# Patient Record
Sex: MALE | Race: BLACK OR AFRICAN AMERICAN | Employment: OTHER | URBAN - METROPOLITAN AREA
[De-identification: names, ages, dates, MRNs, and addresses within clinical notes are randomized per-mention and may not be internally consistent; named-entity substitution may affect disease eponyms.]

---

## 2017-01-04 RX ORDER — AMITRIPTYLINE HYDROCHLORIDE 25 MG/1
TABLET, FILM COATED ORAL
Qty: 30 TABLET | Refills: 0 | Status: SHIPPED | OUTPATIENT
Start: 2017-01-04 | End: 2017-01-28

## 2017-01-09 RX ORDER — POTASSIUM CHLORIDE 600 MG/1
TABLET, FILM COATED, EXTENDED RELEASE ORAL
Qty: 30 TABLET | Refills: 0 | Status: SHIPPED | OUTPATIENT
Start: 2017-01-09 | End: 2017-02-16

## 2017-01-09 RX ORDER — FLUTICASONE PROPIONATE 50 MCG
SPRAY, SUSPENSION (ML) NASAL
Qty: 1 BOTTLE | Refills: 3 | Status: SHIPPED | OUTPATIENT
Start: 2017-01-09

## 2017-01-09 RX ORDER — MONTELUKAST SODIUM 10 MG/1
TABLET ORAL
Qty: 30 TABLET | Refills: 0 | Status: SHIPPED | OUTPATIENT
Start: 2017-01-09 | End: 2017-02-16

## 2017-01-22 RX ORDER — BACLOFEN 10 MG/1
TABLET ORAL
Qty: 90 TABLET | Refills: 0 | Status: SHIPPED | OUTPATIENT
Start: 2017-01-22 | End: 2017-02-16

## 2017-01-30 RX ORDER — AMITRIPTYLINE HYDROCHLORIDE 25 MG/1
TABLET, FILM COATED ORAL
Qty: 30 TABLET | Refills: 0 | Status: SHIPPED | OUTPATIENT
Start: 2017-01-30 | End: 2017-02-28

## 2017-01-30 RX ORDER — BENAZEPRIL HYDROCHLORIDE AND HYDROCHLOROTHIAZIDE 20; 12.5 MG/1; MG/1
TABLET ORAL
Qty: 30 TABLET | Refills: 0 | Status: SHIPPED | OUTPATIENT
Start: 2017-01-30 | End: 2017-03-07

## 2017-02-17 RX ORDER — BACLOFEN 10 MG/1
TABLET ORAL
Qty: 90 TABLET | Refills: 0 | Status: SHIPPED | OUTPATIENT
Start: 2017-02-17 | End: 2017-03-15

## 2017-02-17 RX ORDER — POTASSIUM CHLORIDE 600 MG/1
TABLET, FILM COATED, EXTENDED RELEASE ORAL
Qty: 30 TABLET | Refills: 0 | Status: SHIPPED | OUTPATIENT
Start: 2017-02-17 | End: 2017-03-15

## 2017-02-18 RX ORDER — MONTELUKAST SODIUM 10 MG/1
TABLET ORAL
Qty: 30 TABLET | Refills: 0 | Status: SHIPPED | OUTPATIENT
Start: 2017-02-18 | End: 2017-03-15

## 2017-03-01 RX ORDER — AMITRIPTYLINE HYDROCHLORIDE 25 MG/1
TABLET, FILM COATED ORAL
Qty: 30 TABLET | Refills: 2 | Status: SHIPPED | OUTPATIENT
Start: 2017-03-01 | End: 2017-04-27

## 2017-03-01 NOTE — TELEPHONE ENCOUNTER
Refill Protocol Appointment Criteria  · Appointment scheduled in the past 6 months or in the next 3 months  Recent Visits       Provider Department Primary Dx    4 months ago Mady Guaman DO [facility], [facility], [address], [facility] Cervical radicular

## 2017-03-15 ENCOUNTER — OFFICE VISIT (OUTPATIENT)
Dept: ENDOCRINOLOGY CLINIC | Facility: CLINIC | Age: 52
End: 2017-03-15

## 2017-03-15 VITALS
SYSTOLIC BLOOD PRESSURE: 124 MMHG | HEIGHT: 69 IN | HEART RATE: 85 BPM | WEIGHT: 292 LBS | DIASTOLIC BLOOD PRESSURE: 82 MMHG | BODY MASS INDEX: 43.25 KG/M2

## 2017-03-15 DIAGNOSIS — E78.5 DYSLIPIDEMIA ASSOCIATED WITH TYPE 2 DIABETES MELLITUS (HCC): ICD-10-CM

## 2017-03-15 DIAGNOSIS — Z79.4 UNCONTROLLED TYPE 2 DIABETES MELLITUS WITH HYPERGLYCEMIA, WITH LONG-TERM CURRENT USE OF INSULIN (HCC): Primary | ICD-10-CM

## 2017-03-15 DIAGNOSIS — E11.69 DYSLIPIDEMIA ASSOCIATED WITH TYPE 2 DIABETES MELLITUS (HCC): ICD-10-CM

## 2017-03-15 DIAGNOSIS — E11.65 UNCONTROLLED TYPE 2 DIABETES MELLITUS WITH HYPERGLYCEMIA, WITH LONG-TERM CURRENT USE OF INSULIN (HCC): Primary | ICD-10-CM

## 2017-03-15 LAB
CARTRIDGE LOT#: ABNORMAL NUMERIC
GLUCOSE BLOOD: 287
HEMOGLOBIN A1C: 11.2 % (ref 4.3–5.6)
TEST STRIP LOT #: NORMAL NUMERIC

## 2017-03-15 PROCEDURE — 82962 GLUCOSE BLOOD TEST: CPT | Performed by: INTERNAL MEDICINE

## 2017-03-15 PROCEDURE — 36416 COLLJ CAPILLARY BLOOD SPEC: CPT | Performed by: INTERNAL MEDICINE

## 2017-03-15 PROCEDURE — 99214 OFFICE O/P EST MOD 30 MIN: CPT | Performed by: INTERNAL MEDICINE

## 2017-03-15 PROCEDURE — 99212 OFFICE O/P EST SF 10 MIN: CPT | Performed by: INTERNAL MEDICINE

## 2017-03-15 PROCEDURE — 83036 HEMOGLOBIN GLYCOSYLATED A1C: CPT | Performed by: INTERNAL MEDICINE

## 2017-03-15 RX ORDER — DORZOLAMIDE HCL 20 MG/ML
SOLUTION/ DROPS OPHTHALMIC
Refills: 6 | COMMUNITY
Start: 2017-03-01 | End: 2019-03-05

## 2017-03-15 RX ORDER — NAPROXEN SODIUM 220 MG
TABLET ORAL
Qty: 100 EACH | Refills: 2 | Status: SHIPPED | OUTPATIENT
Start: 2017-03-15 | End: 2017-07-25

## 2017-03-15 NOTE — PATIENT INSTRUCTIONS
Stop all oral medications for Diabetes    Start insulin 70/30: 32 units 15 minutes before breakfast and dinner. Check sugars before breakfast and before dinner  Call me on Friday with sugars.     Call me before if sugar is < 70/ persistently more than 3

## 2017-03-15 NOTE — PROGRESS NOTES
Return Office Visit - Diabetes    CHIEF COMPLAINT:    Type 2 Diabetes     HISTORY OF PRESENT ILLNESS:   Tahir Ramos is a 45year old male who presents for follow up for diabetes.     DM HISTORY  Diagnosed: 2000      HISTORY OF DIABETES COMPLICATIONS: :  Hi BACLOFEN 10 MG Oral Tab TAKE 1 TABLET(10 MG) BY MOUTH THREE TIMES DAILY Disp: 90 tablet Rfl: 0   FLUTICASONE PROPIONATE 50 MCG/ACT Nasal Suspension SHAKE LIQUID AND USE 1 SPRAY IN EACH NOSTRIL TWICE DAILY Disp: 1 Bottle Rfl: 3   Atorvastatin Calcium 40 M past surgical history marked as reviewed. See past family history marked as reviewed. See past social history marked as reviewed.     ASSESSMENTS:     REVIEW OF SYSTEMS:  Constitutional: Negative for: weight change, fever, fatigue, cold/heat intolerance ( 3/2017)    ASSESSMENT AND PLAN:    1. Type 2 DM:    Plan:  Discussed the pathogenesis, natural course of diabetes.  Patient understands the importance of glycemic control and the implications of uncontrolled diabetes including Diabetic ketoacidosis and va

## 2017-03-16 ENCOUNTER — OFFICE VISIT (OUTPATIENT)
Dept: FAMILY MEDICINE CLINIC | Facility: CLINIC | Age: 52
End: 2017-03-16

## 2017-03-16 VITALS
HEART RATE: 88 BPM | DIASTOLIC BLOOD PRESSURE: 78 MMHG | WEIGHT: 290 LBS | TEMPERATURE: 98 F | RESPIRATION RATE: 17 BRPM | SYSTOLIC BLOOD PRESSURE: 110 MMHG | BODY MASS INDEX: 42.95 KG/M2 | HEIGHT: 69 IN

## 2017-03-16 DIAGNOSIS — R42 DIZZINESS: ICD-10-CM

## 2017-03-16 DIAGNOSIS — M50.10 HERNIATION OF CERVICAL INTERVERTEBRAL DISC WITH RADICULOPATHY: ICD-10-CM

## 2017-03-16 DIAGNOSIS — IMO0001 UNCONTROLLED TYPE 2 DIABETES MELLITUS WITHOUT COMPLICATION, WITHOUT LONG-TERM CURRENT USE OF INSULIN: Primary | ICD-10-CM

## 2017-03-16 DIAGNOSIS — I10 ESSENTIAL HYPERTENSION: ICD-10-CM

## 2017-03-16 DIAGNOSIS — M79.89 LEG SWELLING: ICD-10-CM

## 2017-03-16 LAB
BACTERIA UR QL AUTO: NEGATIVE /HPF
BILIRUB UR QL: NEGATIVE
CLARITY UR: CLEAR
COLOR UR: YELLOW
GLUCOSE UR-MCNC: >=500 MG/DL
HGB UR QL STRIP.AUTO: NEGATIVE
HYALINE CASTS #/AREA URNS AUTO: 8 /LPF
KETONES UR-MCNC: NEGATIVE MG/DL
LEUKOCYTE ESTERASE UR QL STRIP.AUTO: NEGATIVE
NITRITE UR QL STRIP.AUTO: NEGATIVE
PH UR: 5 [PH] (ref 5–8)
PROT UR-MCNC: NEGATIVE MG/DL
RBC #/AREA URNS AUTO: <1 /HPF
SP GR UR STRIP: 1.01 (ref 1–1.03)
UROBILINOGEN UR STRIP-ACNC: <2
VIT C UR-MCNC: NEGATIVE MG/DL
WBC #/AREA URNS AUTO: <1 /HPF

## 2017-03-16 PROCEDURE — 99214 OFFICE O/P EST MOD 30 MIN: CPT | Performed by: FAMILY MEDICINE

## 2017-03-16 PROCEDURE — 99212 OFFICE O/P EST SF 10 MIN: CPT | Performed by: FAMILY MEDICINE

## 2017-03-16 RX ORDER — POTASSIUM CHLORIDE 600 MG/1
TABLET, FILM COATED, EXTENDED RELEASE ORAL
Qty: 30 TABLET | Refills: 0 | Status: SHIPPED | OUTPATIENT
Start: 2017-03-16 | End: 2017-03-23

## 2017-03-16 RX ORDER — BACLOFEN 10 MG/1
TABLET ORAL
Qty: 90 TABLET | Refills: 0 | Status: SHIPPED | OUTPATIENT
Start: 2017-03-16 | End: 2017-07-12

## 2017-03-16 RX ORDER — PREGABALIN 50 MG/1
50 CAPSULE ORAL 3 TIMES DAILY
Qty: 90 CAPSULE | Refills: 0 | Status: SHIPPED | OUTPATIENT
Start: 2017-03-16 | End: 2017-07-05

## 2017-03-16 RX ORDER — FUROSEMIDE 40 MG/1
40 TABLET ORAL DAILY
Qty: 30 TABLET | Refills: 1 | Status: SHIPPED | OUTPATIENT
Start: 2017-03-16 | End: 2017-08-23 | Stop reason: DRUGHIGH

## 2017-03-16 RX ORDER — POTASSIUM CHLORIDE 600 MG/1
8 TABLET, FILM COATED, EXTENDED RELEASE ORAL DAILY
Qty: 30 TABLET | Refills: 1 | Status: SHIPPED | OUTPATIENT
Start: 2017-03-16 | End: 2017-07-11

## 2017-03-16 NOTE — PROGRESS NOTES
HPI:    Patient ID: Rodney Oquendo is a 45year old male. Swelling  This is a new problem. The current episode started 1 to 4 weeks ago. The problem occurs constantly. Associated symptoms include fatigue and myalgias.  Pertinent negatives include no visual symptoms include fatigue and myalgias. Pertinent negatives include no visual change. Exacerbated by: Likely uncontrolled blood sugars. He has tried nothing for the symptoms. The treatment provided no relief.        Review of Systems   Constitutional: Ziggy Joseph 3   METHSCOPOLAMINE BROMIDE 2.5 MG Oral Tab TAKE 1 TABLET BY MOUTH TWICE DAILY Disp: 120 tablet Rfl: 0   glipiZIDE 10 MG Oral Tab Take 1 tablet (10 mg total) by mouth 2 (two) times daily before meals.  Disp: 240 tablet Rfl: 0   Linagliptin (TRADJENTA) 5 MG of insulin ([facility] [address]  Ordered along with blood sugar check (>300 mg/dl)   - Urinalysis, Routine [E]; Future    2. Leg swelling  Lasix prescribed with potassium. 3. Dizziness  Likely secondary to # 1    4.  Essential hypertension  To goal.    5. Herniation of

## 2017-03-16 NOTE — PATIENT INSTRUCTIONS
Recommend weight loss via daily exercising and consistent healthy dietary changes. START insulin. Increase hydration. Recommend Lyrica. Lasix and potassium prescribed. Consider ARPIT hose. Keep legs elevated.

## 2017-03-17 ENCOUNTER — TELEPHONE (OUTPATIENT)
Dept: ENDOCRINOLOGY CLINIC | Facility: CLINIC | Age: 52
End: 2017-03-17

## 2017-03-17 RX ORDER — MONTELUKAST SODIUM 10 MG/1
TABLET ORAL
Qty: 30 TABLET | Refills: 3 | Status: SHIPPED | OUTPATIENT
Start: 2017-03-17 | End: 2017-08-03

## 2017-03-17 RX ORDER — GLIPIZIDE 10 MG/1
TABLET ORAL
Qty: 240 TABLET | Refills: 0 | Status: SHIPPED | OUTPATIENT
Start: 2017-03-17 | End: 2017-03-17 | Stop reason: ALTCHOICE

## 2017-03-17 NOTE — TELEPHONE ENCOUNTER
Blood sugar is not >250 that MD needs be paged. Patient is currently on insulin. Patient currently taking 32 units BID insulin 70/30. Patient's first injection was last night. No reports of hypoglycemia.  Instructed patient to continue to check BG levels BI

## 2017-03-17 NOTE — TELEPHONE ENCOUNTER
Dr. Milan Campoverde sent the following routing request:     I signed the glipizide prescription.        Please call pharmacy and cancel since I ercently changed patient to insulin.

## 2017-03-20 ENCOUNTER — TELEPHONE (OUTPATIENT)
Dept: FAMILY MEDICINE CLINIC | Facility: CLINIC | Age: 52
End: 2017-03-20

## 2017-03-20 NOTE — TELEPHONE ENCOUNTER
----- Message from Rudy Slava, DO sent at 3/19/2017  7:08 PM CDT -----  A1c greater than 11. Needs much better control. Recommend endocrine. See if patient is agreeable.

## 2017-03-20 NOTE — TELEPHONE ENCOUNTER
Pt returned call and aware of his lab results already. He has seen Dr Manjinder Guadalupe on 3/15/17 and Dr Charissa Hardy last visit on 3/16/17.   Next appt with endocrinology scheduled for 5/17/17 @ 1:00pm.

## 2017-03-20 NOTE — TELEPHONE ENCOUNTER
[phone], then ext [phone] thereafter. Also, sent message to pt via active [employer].

## 2017-03-21 NOTE — TELEPHONE ENCOUNTER
Pt called w/ results:     3/17/17  Evenin  3/18/17  Mornin   Evenin  3/19/17  Mornin   Evenin   3/20/17  Mornin   Evenin   3/21/17  Mornin    Please call.

## 2017-03-22 NOTE — TELEPHONE ENCOUNTER
Spoke with Ralph Sánchez and informed him of Dr. Yokasta Jones instructions below. He verbalized his understanding and had no further questions at this time.

## 2017-03-23 RX ORDER — POTASSIUM CHLORIDE 600 MG/1
TABLET, FILM COATED, EXTENDED RELEASE ORAL
Qty: 30 TABLET | Refills: 0 | Status: SHIPPED | OUTPATIENT
Start: 2017-03-23 | End: 2017-07-05

## 2017-03-24 ENCOUNTER — TELEPHONE (OUTPATIENT)
Dept: FAMILY MEDICINE CLINIC | Facility: CLINIC | Age: 52
End: 2017-03-24

## 2017-03-24 RX ORDER — POTASSIUM CHLORIDE 600 MG/1
16 TABLET, FILM COATED, EXTENDED RELEASE ORAL DAILY
Qty: 60 TABLET | Refills: 0 | Status: SHIPPED | OUTPATIENT
Start: 2017-03-24 | End: 2017-05-19

## 2017-03-24 RX ORDER — FUROSEMIDE 40 MG/1
80 TABLET ORAL DAILY
Qty: 60 TABLET | Refills: 0 | Status: SHIPPED | OUTPATIENT
Start: 2017-03-24 | End: 2017-05-05

## 2017-04-07 RX ORDER — GABAPENTIN 600 MG/1
600 TABLET ORAL 3 TIMES DAILY
Qty: 90 TABLET | Refills: 0 | Status: SHIPPED | OUTPATIENT
Start: 2017-04-07 | End: 2017-08-04 | Stop reason: ALTCHOICE

## 2017-04-07 NOTE — TELEPHONE ENCOUNTER
PA for Lyrica denied. Plan states please use preferred drug list medications; Neurontin (at least 1800 mg/day) AND at least one of the following effexor, effexor xr or Cymbalta.

## 2017-04-13 ENCOUNTER — TELEPHONE (OUTPATIENT)
Dept: CARDIOLOGY CLINIC | Facility: CLINIC | Age: 52
End: 2017-04-13

## 2017-04-13 RX ORDER — CARVEDILOL 12.5 MG/1
12.5 TABLET ORAL 2 TIMES DAILY WITH MEALS
Qty: 60 TABLET | Refills: 5 | Status: SHIPPED | OUTPATIENT
Start: 2017-04-13 | End: 2017-12-06

## 2017-04-13 NOTE — TELEPHONE ENCOUNTER
Current Outpatient Prescriptions:  carvedilol 12.5 MG Oral Tab Take 1 tablet (12.5 mg total) by mouth 2 (two) times daily with meals.  Disp: 60 tablet Rfl: 6

## 2017-04-27 RX ORDER — AMITRIPTYLINE HYDROCHLORIDE 25 MG/1
TABLET, FILM COATED ORAL
Qty: 30 TABLET | Refills: 2 | Status: SHIPPED | OUTPATIENT
Start: 2017-04-27 | End: 2017-08-19

## 2017-05-05 RX ORDER — FUROSEMIDE 40 MG/1
TABLET ORAL
Qty: 60 TABLET | Refills: 0 | Status: SHIPPED | OUTPATIENT
Start: 2017-05-05 | End: 2017-06-13

## 2017-05-08 RX ORDER — LINAGLIPTIN 5 MG/1
TABLET, FILM COATED ORAL
Qty: 90 TABLET | Refills: 0 | Status: SHIPPED | OUTPATIENT
Start: 2017-05-08 | End: 2017-08-04 | Stop reason: ALTCHOICE

## 2017-05-20 RX ORDER — POTASSIUM CHLORIDE 600 MG/1
TABLET, FILM COATED, EXTENDED RELEASE ORAL
Qty: 60 TABLET | Refills: 0 | Status: SHIPPED | OUTPATIENT
Start: 2017-05-20 | End: 2017-07-05

## 2017-05-22 RX ORDER — INSULIN NPH HUM/REG INSULIN HM 70-30/ML
VIAL (ML) SUBCUTANEOUS
Qty: 20 ML | Refills: 0 | Status: SHIPPED | OUTPATIENT
Start: 2017-05-22 | End: 2017-06-28

## 2017-05-22 NOTE — TELEPHONE ENCOUNTER
LOV 3/15/17. No F/U scheduled. Called Teodoro. Left detailed message that he is due for a follow up appt.

## 2017-06-08 ENCOUNTER — TELEPHONE (OUTPATIENT)
Dept: FAMILY MEDICINE CLINIC | Facility: CLINIC | Age: 52
End: 2017-06-08

## 2017-06-08 RX ORDER — METHSCOPOLAMINE BROMIDE 2.5 MG/1
TABLET ORAL
Qty: 120 TABLET | Refills: 0 | Status: SHIPPED | OUTPATIENT
Start: 2017-06-08 | End: 2017-08-03

## 2017-06-08 NOTE — TELEPHONE ENCOUNTER
Per pharmacy pt plan requires the generic medication to be dispensed for Amlodipine. Please fax the generic script to the pharmacy. Per pharmacy insurance will pay for once a day dosage.

## 2017-06-08 NOTE — TELEPHONE ENCOUNTER
Contacted pharmacy, was advised to disregard request as med is covered under pt's plan. Nothing further needed at this time.

## 2017-06-13 RX ORDER — FUROSEMIDE 40 MG/1
TABLET ORAL
Qty: 60 TABLET | Refills: 0 | Status: SHIPPED | OUTPATIENT
Start: 2017-06-13 | End: 2017-07-05

## 2017-06-29 RX ORDER — INSULIN NPH HUM/REG INSULIN HM 70-30/ML
VIAL (ML) SUBCUTANEOUS
Qty: 10 ML | Refills: 0 | Status: SHIPPED | OUTPATIENT
Start: 2017-06-29 | End: 2017-07-25

## 2017-06-29 NOTE — TELEPHONE ENCOUNTER
LOV 3/15/17 with RTC 2 months. No F/U scheduled. Called patient. Called patient. Booked follow up appt for 7/5 in [facility]. Per AM ok to refill to next follow up appt. 1 vial of insulin refilled.

## 2017-07-05 ENCOUNTER — OFFICE VISIT (OUTPATIENT)
Dept: ENDOCRINOLOGY CLINIC | Facility: CLINIC | Age: 52
End: 2017-07-05

## 2017-07-05 VITALS
HEIGHT: 69 IN | BODY MASS INDEX: 42.83 KG/M2 | SYSTOLIC BLOOD PRESSURE: 101 MMHG | DIASTOLIC BLOOD PRESSURE: 72 MMHG | WEIGHT: 289.19 LBS | HEART RATE: 93 BPM

## 2017-07-05 DIAGNOSIS — E11.69 DYSLIPIDEMIA ASSOCIATED WITH TYPE 2 DIABETES MELLITUS (HCC): ICD-10-CM

## 2017-07-05 DIAGNOSIS — E78.5 DYSLIPIDEMIA ASSOCIATED WITH TYPE 2 DIABETES MELLITUS (HCC): ICD-10-CM

## 2017-07-05 DIAGNOSIS — E11.65 UNCONTROLLED TYPE 2 DIABETES MELLITUS WITH HYPERGLYCEMIA, WITH LONG-TERM CURRENT USE OF INSULIN (HCC): Primary | ICD-10-CM

## 2017-07-05 DIAGNOSIS — Z79.4 UNCONTROLLED TYPE 2 DIABETES MELLITUS WITH HYPERGLYCEMIA, WITH LONG-TERM CURRENT USE OF INSULIN (HCC): Primary | ICD-10-CM

## 2017-07-05 LAB
CARTRIDGE LOT#: ABNORMAL NUMERIC
GLUCOSE BLOOD: 269
HEMOGLOBIN A1C: 12.2 % (ref 4.3–5.6)
TEST STRIP LOT #: NORMAL NUMERIC

## 2017-07-05 PROCEDURE — 36416 COLLJ CAPILLARY BLOOD SPEC: CPT | Performed by: INTERNAL MEDICINE

## 2017-07-05 PROCEDURE — 99212 OFFICE O/P EST SF 10 MIN: CPT | Performed by: INTERNAL MEDICINE

## 2017-07-05 PROCEDURE — 99214 OFFICE O/P EST MOD 30 MIN: CPT | Performed by: INTERNAL MEDICINE

## 2017-07-05 PROCEDURE — 83036 HEMOGLOBIN GLYCOSYLATED A1C: CPT | Performed by: INTERNAL MEDICINE

## 2017-07-05 PROCEDURE — 82962 GLUCOSE BLOOD TEST: CPT | Performed by: INTERNAL MEDICINE

## 2017-07-05 RX ORDER — POTASSIUM CHLORIDE 600 MG/1
TABLET, FILM COATED, EXTENDED RELEASE ORAL
Qty: 60 TABLET | Refills: 0 | OUTPATIENT
Start: 2017-07-05

## 2017-07-05 RX ORDER — GLUCOSAM/CHON-MSM1/C/MANG/BOSW 500-416.6
TABLET ORAL
Qty: 100 EACH | Refills: 3 | Status: SHIPPED | OUTPATIENT
Start: 2017-07-05

## 2017-07-05 NOTE — PROGRESS NOTES
Return Office Visit - Diabetes    CHIEF COMPLAINT:    Type 2 Diabetes   Dyslipidemia    HISTORY OF PRESENT ILLNESS:   Víctor Coreas is a 45year old male who presents for follow up for diabetes.     DM HISTORY  Diagnosed: 2000      HISTORY OF DIABETES COMPLI TABLET(10 MG) BY MOUTH THREE TIMES DAILY Disp: 90 tablet Rfl: 0   furosemide (LASIX) 40 MG Oral Tab Take 1 tablet (40 mg total) by mouth daily.  Disp: 30 tablet Rfl: 1   Potassium Chloride ER (KLOR-CON) 8 MEQ Oral Tab CR Take 1 tablet (8 mEq total) by mouth Vitro Strip Check sugars 2-3 times a day Disp: 100 each Rfl: 3       PAST MEDICAL, SOCIAL AND FAMILY HISTORY:  See past medical history marked as reviewed. See past surgical history marked as reviewed. See past family history marked as reviewed.   See pas 0-99 mg/dL 110 (H) 59   MICROALBUMIN/CREATININE RATIO Latest Ref Range: 0.0-20.0   121.1 (H)       HbA1c 11.7 % --> 7.2 % ( August 2016) --> 8.6 % ( 11/2016) --> 11.2 ( 3/2017) --> 12.2 % ( 7/2017)    ASSESSMENT AND PLAN:    1.  Type 2 DM:    Plan:  Discuss repeat lipid panel and Ur MA during that visit      This is a 30 minute visit and greater than 50% of the time was spent counseling the patient and/or coordinating care.                 Orders Placed This Encounter      POC Glycohemoglobin Klarissa Snowden      POC

## 2017-07-05 NOTE — TELEPHONE ENCOUNTER
Chart reviewed. Refill inappropriate. Patient medication list shows she is taking    Potassium Chloride ER (KLOR-CON) 8 MEQ Oral Tab CR 30 tablet 1 3/16/2017    Sig :  Take 1 tablet (8 mEq total) by mouth daily.         Hypertensive Medications  Protocol Cr

## 2017-07-13 RX ORDER — POTASSIUM CHLORIDE 600 MG/1
TABLET, FILM COATED, EXTENDED RELEASE ORAL
Qty: 60 TABLET | Refills: 0 | OUTPATIENT
Start: 2017-07-13

## 2017-07-18 RX ORDER — BACLOFEN 10 MG/1
TABLET ORAL
Qty: 90 TABLET | Refills: 0 | Status: SHIPPED | OUTPATIENT
Start: 2017-07-18 | End: 2017-08-16

## 2017-07-18 NOTE — TELEPHONE ENCOUNTER
DR Daryn Cline: please review and advise on refill.     Refill Protocol Appointment Criteria  · Appointment scheduled in the past 6 months or in the next 3 months  Recent Outpatient Visits            1 week ago Uncontrolled type 2 diabetes mellitus with hyperg

## 2017-07-19 RX ORDER — AMLODIPINE BESYLATE 10 MG/1
TABLET ORAL
Qty: 180 TABLET | Refills: 0 | Status: SHIPPED | OUTPATIENT
Start: 2017-07-19 | End: 2017-08-04

## 2017-07-19 NOTE — TELEPHONE ENCOUNTER
Requesting Amlodipine refill    Prescription refilled per FM refill protocol    Hypertensive Medications  Protocol Criteria:  · Appointment scheduled in the past 6 months or in the next 3 months  · BMP or CMP in the past 12 months  · Creatinine result < 2

## 2017-07-20 RX ORDER — MONTELUKAST SODIUM 10 MG/1
TABLET ORAL
Qty: 30 TABLET | Refills: 3 | OUTPATIENT
Start: 2017-07-20

## 2017-07-25 ENCOUNTER — TELEPHONE (OUTPATIENT)
Dept: ENDOCRINOLOGY CLINIC | Facility: CLINIC | Age: 52
End: 2017-07-25

## 2017-07-25 RX ORDER — GLIPIZIDE 10 MG/1
TABLET ORAL
Qty: 180 TABLET | Refills: 0 | Status: SHIPPED | OUTPATIENT
Start: 2017-07-25 | End: 2019-03-05

## 2017-07-25 RX ORDER — NAPROXEN SODIUM 220 MG
TABLET ORAL
Qty: 100 EACH | Refills: 2 | Status: SHIPPED | OUTPATIENT
Start: 2017-07-25 | End: 2018-05-04

## 2017-07-25 NOTE — ADDENDUM NOTE
Encounter addended by: Mateo Holland RN on: 7/25/2017 12:33 PM<BR>    Actions taken: Contacts section saved, Sign clinical note

## 2017-07-25 NOTE — TELEPHONE ENCOUNTER
Spoke with Fatsoma. Booked appt for next month. He provided sugars and will need an updated refill for his insulin. He is currently taking humulin 70/30 38 units with breakfast and 40 units with dinner.                 KRISTI PNEA      7/17:  196        360

## 2017-07-27 RX ORDER — MONTELUKAST SODIUM 10 MG/1
TABLET ORAL
Qty: 30 TABLET | Refills: 0 | OUTPATIENT
Start: 2017-07-27

## 2017-07-27 NOTE — TELEPHONE ENCOUNTER
Rx request for Montelukast Sodium 10 mg, Please review. Thank you. LOV: 11/19/14, No future appts scheduled.   Last Refill: 3/17/17

## 2017-08-03 RX ORDER — FUROSEMIDE 40 MG/1
TABLET ORAL
Qty: 60 TABLET | Refills: 1 | Status: SHIPPED | OUTPATIENT
Start: 2017-08-03 | End: 2017-10-02

## 2017-08-03 RX ORDER — MONTELUKAST SODIUM 10 MG/1
TABLET ORAL
Qty: 30 TABLET | Refills: 2 | Status: SHIPPED | OUTPATIENT
Start: 2017-08-03 | End: 2017-12-06

## 2017-08-03 RX ORDER — METHSCOPOLAMINE BROMIDE 2.5 MG/1
2.5 TABLET ORAL 2 TIMES DAILY
Qty: 120 TABLET | Refills: 1 | Status: SHIPPED | OUTPATIENT
Start: 2017-08-03 | End: 2020-07-15 | Stop reason: ALTCHOICE

## 2017-08-03 NOTE — TELEPHONE ENCOUNTER
PATIENT CALLING SAID THAT HE ALSO NEED THE SINGULAIR AND FUROSEMIDE AND METHSCOPOLAMINE BROMIDE  2.5 MG     INCLUDE REFILLS        THESE ARE IN HIS MEDICATIONS. PATIENT HAVING PROBLEMS GETTING FILLED. PATIENT FRUSTRATED.

## 2017-08-04 ENCOUNTER — TELEPHONE (OUTPATIENT)
Dept: FAMILY MEDICINE CLINIC | Facility: CLINIC | Age: 52
End: 2017-08-04

## 2017-08-04 ENCOUNTER — OFFICE VISIT (OUTPATIENT)
Dept: OTOLARYNGOLOGY | Facility: CLINIC | Age: 52
End: 2017-08-04

## 2017-08-04 VITALS
WEIGHT: 250 LBS | SYSTOLIC BLOOD PRESSURE: 144 MMHG | TEMPERATURE: 97 F | HEIGHT: 69 IN | DIASTOLIC BLOOD PRESSURE: 88 MMHG | BODY MASS INDEX: 37.03 KG/M2

## 2017-08-04 DIAGNOSIS — R07.0 THROAT PAIN IN ADULT: Primary | ICD-10-CM

## 2017-08-04 PROCEDURE — 99214 OFFICE O/P EST MOD 30 MIN: CPT | Performed by: OTOLARYNGOLOGY

## 2017-08-04 PROCEDURE — 99212 OFFICE O/P EST SF 10 MIN: CPT | Performed by: OTOLARYNGOLOGY

## 2017-08-04 PROCEDURE — 31575 DIAGNOSTIC LARYNGOSCOPY: CPT | Performed by: OTOLARYNGOLOGY

## 2017-08-04 RX ORDER — LORATADINE 10 MG/1
10 TABLET ORAL DAILY
Qty: 30 TABLET | Refills: 3 | Status: SHIPPED | OUTPATIENT
Start: 2017-08-04 | End: 2019-06-15 | Stop reason: ALTCHOICE

## 2017-08-04 RX ORDER — FLUTICASONE PROPIONATE 50 MCG
1 SPRAY, SUSPENSION (ML) NASAL 2 TIMES DAILY
Qty: 1 BOTTLE | Refills: 3 | Status: SHIPPED | OUTPATIENT
Start: 2017-08-04 | End: 2017-08-23

## 2017-08-04 RX ORDER — MONTELUKAST SODIUM 10 MG/1
10 TABLET ORAL NIGHTLY
Qty: 30 TABLET | Refills: 3 | Status: SHIPPED | OUTPATIENT
Start: 2017-08-04 | End: 2017-08-23

## 2017-08-04 NOTE — PROGRESS NOTES
Ronn Waterman is a 45year old male. Patient presents with:  Throat Problem: phlegm build up since September       HISTORY OF PRESENT ILLNESS  History of laryngeal abscess. Resolved with Clindamycin. Now having some throat issues due to chronic PND.  No frederick Comment:rarely   Exercise                No    Comment:walking only    Social History Narrative    The patient does not use an assistive device. .      The patient does live in a home with stairs.         Family History   Problem Relation Age of Onset   • Pr Negative Frequent skin infections, pigment change and rash. Hema/Lymph Negative Easy bleeding and easy bruising.            PHYSICAL EXAM    /88 (BP Location: Left arm, Patient Position: Sitting, Cuff Size: large)   Temp (!) 97.4 °F (36.3 °C) (Oral) performed. The flexible fiberoptic laryngoscope was placed into the nose or mouthand advanced  into the interior of the larynx. A thorough examination of the interior of the larynx was performed. Findings were as follows.        Hypopharynx/Larynx:  Epigl apply Misc, Check sugars 2-3 times a day., Disp: 100 each, Rfl: 3  •  AMITRIPTYLINE HCL 25 MG Oral Tab, TAKE 1 TABLET BY MOUTH EVERY NIGHT AT BEDTIME, Disp: 30 tablet, Rfl: 2  •  carvedilol 12.5 MG Oral Tab, Take 1 tablet (12.5 mg total) by mouth 2 (two) t 2 (two) times daily with meals.  ), Disp: 120 tablet, Rfl: 2  ASSESSMENT AND PLAN    1. Throat pain in adult  I suspect chronic postnasal discharge. No real history of GERD.   Erythema of the pharyngeal mucosa and laryngeal exam reveals no abnormalities ot

## 2017-08-04 NOTE — TELEPHONE ENCOUNTER
PA for Methscopolamine Bromide 2.5 mg tab completed with EPS via CMM response time 24-72 hours KEY [id number].

## 2017-08-04 NOTE — TELEPHONE ENCOUNTER
Per Areli, the Methscopolamine BR 2.5mg tabs is not covered under pt's insurance. Pls call [phone] for a prior auth or change medication, pt ID# [id number].

## 2017-08-04 NOTE — TELEPHONE ENCOUNTER
Refilled per protocol.    Refill Protocol Appointment Criteria  · Appointment scheduled in the past 12 months or in the next 3 months  Recent Outpatient Visits            4 weeks ago Uncontrolled type 2 diabetes mellitus with hyperglycemia, with long-term c Visit    4 months ago Uncontrolled type 2 diabetes mellitus with hyperglycemia, with long-term current use of insulin [facility])    Mayela Cristina MD    Office Visit    8 months ago Uncontrolled type 2 diabetes mellitus Eddie Goodman, DO    Office Visit        Future Appointments       Provider Department Appt Notes    Tomorrow MD Mario [address] ENT f/u    In 3 weeks Mayelin Elmore MD [address], [place]     In 1 month Emilia Jarrett,

## 2017-08-16 NOTE — TELEPHONE ENCOUNTER
LOV 7/5/2017   F/U 8/30/2017    I think this medication was discontinued. Not sure if you wanted it refilled or not.

## 2017-08-17 RX ORDER — LINAGLIPTIN 5 MG/1
TABLET, FILM COATED ORAL
Qty: 90 TABLET | Refills: 0 | OUTPATIENT
Start: 2017-08-17

## 2017-08-18 RX ORDER — BACLOFEN 10 MG/1
TABLET ORAL
Qty: 90 TABLET | Refills: 2 | Status: SHIPPED | OUTPATIENT
Start: 2017-08-18 | End: 2017-11-28

## 2017-08-18 NOTE — TELEPHONE ENCOUNTER
Please advise on refill request, med not part of protocol  Last refilled 7-19-17 #[id number]  Pended for 3 months.     Refill Protocol Appointment Criteria  · Appointment scheduled in the past 6 months or in the next 3 months  Recent Outpatient Visits

## 2017-08-23 ENCOUNTER — OFFICE VISIT (OUTPATIENT)
Dept: ENDOCRINOLOGY CLINIC | Facility: CLINIC | Age: 52
End: 2017-08-23

## 2017-08-23 ENCOUNTER — TELEPHONE (OUTPATIENT)
Dept: ENDOCRINOLOGY CLINIC | Facility: CLINIC | Age: 52
End: 2017-08-23

## 2017-08-23 ENCOUNTER — APPOINTMENT (OUTPATIENT)
Dept: LAB | Age: 52
End: 2017-08-23
Attending: INTERNAL MEDICINE
Payer: COMMERCIAL

## 2017-08-23 VITALS
WEIGHT: 286.81 LBS | BODY MASS INDEX: 42 KG/M2 | HEART RATE: 83 BPM | SYSTOLIC BLOOD PRESSURE: 109 MMHG | DIASTOLIC BLOOD PRESSURE: 73 MMHG

## 2017-08-23 DIAGNOSIS — E78.5 DYSLIPIDEMIA: ICD-10-CM

## 2017-08-23 DIAGNOSIS — E11.65 UNCONTROLLED TYPE 2 DIABETES MELLITUS WITH HYPERGLYCEMIA, WITH LONG-TERM CURRENT USE OF INSULIN (HCC): Primary | ICD-10-CM

## 2017-08-23 DIAGNOSIS — Z79.4 UNCONTROLLED TYPE 2 DIABETES MELLITUS WITH HYPERGLYCEMIA, WITH LONG-TERM CURRENT USE OF INSULIN (HCC): ICD-10-CM

## 2017-08-23 DIAGNOSIS — E04.1 THYROID NODULE: ICD-10-CM

## 2017-08-23 DIAGNOSIS — E11.65 UNCONTROLLED TYPE 2 DIABETES MELLITUS WITH HYPERGLYCEMIA, WITH LONG-TERM CURRENT USE OF INSULIN (HCC): ICD-10-CM

## 2017-08-23 DIAGNOSIS — R22.1 NECK FULLNESS: ICD-10-CM

## 2017-08-23 DIAGNOSIS — Z79.4 UNCONTROLLED TYPE 2 DIABETES MELLITUS WITH HYPERGLYCEMIA, WITH LONG-TERM CURRENT USE OF INSULIN (HCC): Primary | ICD-10-CM

## 2017-08-23 LAB
ALBUMIN SERPL BCP-MCNC: 4 G/DL (ref 3.5–4.8)
ALBUMIN/GLOB SERPL: 1.3 {RATIO} (ref 1–2)
ALP SERPL-CCNC: 65 U/L (ref 32–100)
ALT SERPL-CCNC: 27 U/L (ref 17–63)
ANION GAP SERPL CALC-SCNC: 7 MMOL/L (ref 0–18)
AST SERPL-CCNC: 22 U/L (ref 15–41)
BILIRUB SERPL-MCNC: 1.3 MG/DL (ref 0.3–1.2)
BUN SERPL-MCNC: 15 MG/DL (ref 8–20)
BUN/CREAT SERPL: 13.3 (ref 10–20)
CALCIUM SERPL-MCNC: 9.3 MG/DL (ref 8.5–10.5)
CHLORIDE SERPL-SCNC: 101 MMOL/L (ref 95–110)
CO2 SERPL-SCNC: 32 MMOL/L (ref 22–32)
CREAT SERPL-MCNC: 1.13 MG/DL (ref 0.5–1.5)
CREAT UR-MCNC: 167.5 MG/DL
GLOBULIN PLAS-MCNC: 3.2 G/DL (ref 2.5–3.7)
GLUCOSE BLOOD: 153
GLUCOSE SERPL-MCNC: 151 MG/DL (ref 70–99)
LDLC SERPL DIRECT ASSAY-MCNC: 67 MG/DL (ref 0–99)
MICROALBUMIN UR-MCNC: 4.8 MG/DL (ref 0–1.8)
MICROALBUMIN/CREAT UR: 28.7 MG/G{CREAT} (ref 0–20)
OSMOLALITY UR CALC.SUM OF ELEC: 294 MOSM/KG (ref 275–295)
POTASSIUM SERPL-SCNC: 3.8 MMOL/L (ref 3.3–5.1)
PROT SERPL-MCNC: 7.2 G/DL (ref 5.9–8.4)
SODIUM SERPL-SCNC: 140 MMOL/L (ref 136–144)
TEST STRIP LOT #: NORMAL NUMERIC

## 2017-08-23 PROCEDURE — 80053 COMPREHEN METABOLIC PANEL: CPT

## 2017-08-23 PROCEDURE — 99214 OFFICE O/P EST MOD 30 MIN: CPT | Performed by: INTERNAL MEDICINE

## 2017-08-23 PROCEDURE — 99212 OFFICE O/P EST SF 10 MIN: CPT | Performed by: INTERNAL MEDICINE

## 2017-08-23 PROCEDURE — 83721 ASSAY OF BLOOD LIPOPROTEIN: CPT

## 2017-08-23 PROCEDURE — 36416 COLLJ CAPILLARY BLOOD SPEC: CPT | Performed by: INTERNAL MEDICINE

## 2017-08-23 PROCEDURE — 82570 ASSAY OF URINE CREATININE: CPT

## 2017-08-23 PROCEDURE — 82043 UR ALBUMIN QUANTITATIVE: CPT

## 2017-08-23 PROCEDURE — 82962 GLUCOSE BLOOD TEST: CPT | Performed by: INTERNAL MEDICINE

## 2017-08-23 PROCEDURE — 36415 COLL VENOUS BLD VENIPUNCTURE: CPT

## 2017-08-23 RX ORDER — AMITRIPTYLINE HYDROCHLORIDE 25 MG/1
TABLET, FILM COATED ORAL
Qty: 30 TABLET | Refills: 0 | Status: SHIPPED | OUTPATIENT
Start: 2017-08-23 | End: 2017-09-20

## 2017-08-23 NOTE — PROGRESS NOTES
Return Office Visit - Diabetes    CHIEF COMPLAINT:    Type 2 Diabetes   Dyslipidemia    HISTORY OF PRESENT ILLNESS:   Brittany Varela is a 45year old male who presents for follow up for diabetes.     DM HISTORY  Diagnosed: 2000      HISTORY OF DIABETES COMPLI day. Disp: 100 each Rfl: 2   Potassium Chloride ER (KLOR-CON) 8 MEQ Oral Tab CR Take 1 tablet (8 mEq total) by mouth 2 (two) times daily. Disp: 60 tablet Rfl: 2   Glucose Blood (TRUE METRIX BLOOD GLUCOSE TEST) In Vitro Strip Check 2-3 times a day.  Disp: 30 social history marked as reviewed.     ASSESSMENTS:     REVIEW OF SYSTEMS:  Constitutional: Negative for: weight change, fever, fatigue, cold/heat intolerance  Eyes: Negative for:  Visual changes, proptosis, blurring  ENT: Negative for:  dysphagia, neck swe Type 2 DM:    Plan:  Discussed the pathogenesis, natural course of diabetes.  Patient understands the importance of glycemic control and the implications of uncontrolled diabetes including Diabetic ketoacidosis and various micro vascular and macrovascular c Microalb/Creat Ratio, Random Urine [E]      Comp Metabolic Panel [E]

## 2017-08-23 NOTE — TELEPHONE ENCOUNTER
Refilled per protocol.    Last refilled 4-27-17 #30 #[id number]  Refill Protocol Appointment Criteria  · Appointment scheduled in the past 6 months or in the next 3 months  Recent Outpatient Visits            2 weeks ago Throat pain in adult    [facility]

## 2017-08-29 RX ORDER — ATORVASTATIN CALCIUM 40 MG/1
TABLET, FILM COATED ORAL
Qty: 90 TABLET | Refills: 0 | Status: SHIPPED | OUTPATIENT
Start: 2017-08-29 | End: 2017-12-06

## 2017-08-29 NOTE — TELEPHONE ENCOUNTER
Fax received from [facility].  methscopolam tab 2.5mg is approved for 12 months

## 2017-08-30 NOTE — TELEPHONE ENCOUNTER
Called Teodoro. He states he is out of the house now and will contact the office this afternoon with blood sugars.

## 2017-08-31 NOTE — TELEPHONE ENCOUNTER
Pt called with sugar readings:      Before Brkfst Before Dinner  8/24/17  180  213  8/25/17  152  196  8/26/17  140  182  8/27/17  174  106  8/28/17  189  106  8/29/17  150  191  8/30/17  202  128  8/31/17  No reading    Please call.

## 2017-09-01 ENCOUNTER — HOSPITAL ENCOUNTER (OUTPATIENT)
Dept: ULTRASOUND IMAGING | Age: 52
Discharge: HOME | End: 2017-09-01
Attending: INTERNAL MEDICINE
Payer: COMMERCIAL

## 2017-09-01 DIAGNOSIS — R22.1 NECK FULLNESS: ICD-10-CM

## 2017-09-01 DIAGNOSIS — E04.1 THYROID NODULE: ICD-10-CM

## 2017-09-01 PROCEDURE — 76536 US EXAM OF HEAD AND NECK: CPT | Performed by: INTERNAL MEDICINE

## 2017-09-01 RX ORDER — BENAZEPRIL HYDROCHLORIDE AND HYDROCHLOROTHIAZIDE 20; 12.5 MG/1; MG/1
TABLET ORAL
Qty: 30 TABLET | Refills: 2 | Status: SHIPPED | OUTPATIENT
Start: 2017-09-01 | End: 2017-12-06

## 2017-09-01 NOTE — TELEPHONE ENCOUNTER
Called Teodoro. Informed him of Dr. Omkar Abdalla message below. He verbalized his understanding and had no further questions at this time.

## 2017-09-01 NOTE — TELEPHONE ENCOUNTER
Called Teodoor. Informed him of Dr. Alia Diane instructions below. He verbalized his understanding of the insulin changes and will call in one week with sugars. He saw his recent blood test results on MyChart.  He is concerned about his kidney health and

## 2017-09-01 NOTE — TELEPHONE ENCOUNTER
Refilled per protocol.   Hypertensive Medications  Protocol Criteria:  · Appointment scheduled in the past 6 months or in the next 3 months  · BMP or CMP in the past 12 months  · Creatinine result < 2  Recent Outpatient Visits            1 week ago Uncont

## 2017-09-01 NOTE — TELEPHONE ENCOUNTER
Please confirm current regimen of    insulin 70/30 to 38 in the morning and 40 in the evening   Recommend changing to 40 in am and 44 in pm  Call in one week.   Thx.

## 2017-09-01 NOTE — TELEPHONE ENCOUNTER
His kidney blood work is okay . Urine test does show some excretion of protein in urine. This can be due to uncontrolled BG but is reversible. Also he has been prescribed benazepril- HCTZ by Dr Randy Huntley today. The benazepril should help too.    It is impor

## 2017-09-05 ENCOUNTER — TELEPHONE (OUTPATIENT)
Dept: ENDOCRINOLOGY CLINIC | Facility: CLINIC | Age: 52
End: 2017-09-05

## 2017-09-05 NOTE — TELEPHONE ENCOUNTER
Patient has thyroid nodules. He will need a FNA, that is a biopsy of the nodule. When is he coming next? I can discuss it with him. He should come in 2 months.

## 2017-09-05 NOTE — TELEPHONE ENCOUNTER
Called Teodoro. Informed him of Dr. Roman Se message below. He has [employer] insurance so he would like to be seen before October. Booked appt in OP next week.

## 2017-09-12 RX ORDER — POTASSIUM CHLORIDE 600 MG/1
TABLET, FILM COATED, EXTENDED RELEASE ORAL
Qty: 60 TABLET | Refills: 0 | Status: SHIPPED | OUTPATIENT
Start: 2017-09-12 | End: 2017-11-26

## 2017-09-13 ENCOUNTER — OFFICE VISIT (OUTPATIENT)
Dept: ENDOCRINOLOGY CLINIC | Facility: CLINIC | Age: 52
End: 2017-09-13

## 2017-09-13 VITALS
DIASTOLIC BLOOD PRESSURE: 72 MMHG | HEIGHT: 69 IN | WEIGHT: 289.38 LBS | HEART RATE: 80 BPM | SYSTOLIC BLOOD PRESSURE: 106 MMHG | BODY MASS INDEX: 42.86 KG/M2

## 2017-09-13 DIAGNOSIS — E11.65 TYPE 2 DIABETES MELLITUS WITH HYPERGLYCEMIA, WITHOUT LONG-TERM CURRENT USE OF INSULIN (HCC): ICD-10-CM

## 2017-09-13 DIAGNOSIS — E04.1 THYROID NODULE: Primary | ICD-10-CM

## 2017-09-13 LAB
CARTRIDGE LOT#: ABNORMAL NUMERIC
GLUCOSE BLOOD: 151
HEMOGLOBIN A1C: 12.1 % (ref 4.3–5.6)
TEST STRIP LOT #: NORMAL NUMERIC

## 2017-09-13 PROCEDURE — 36416 COLLJ CAPILLARY BLOOD SPEC: CPT | Performed by: INTERNAL MEDICINE

## 2017-09-13 PROCEDURE — 99212 OFFICE O/P EST SF 10 MIN: CPT | Performed by: INTERNAL MEDICINE

## 2017-09-13 PROCEDURE — 82962 GLUCOSE BLOOD TEST: CPT | Performed by: INTERNAL MEDICINE

## 2017-09-13 PROCEDURE — 99213 OFFICE O/P EST LOW 20 MIN: CPT | Performed by: INTERNAL MEDICINE

## 2017-09-13 PROCEDURE — 83036 HEMOGLOBIN GLYCOSYLATED A1C: CPT | Performed by: INTERNAL MEDICINE

## 2017-09-13 NOTE — PROGRESS NOTES
Return Office Visit     CHIEF COMPLAINT:  Patient presents with:  Thyroid Nodule: Discuss treatment options  Diabetes: LDEE 2 weeks ago       HISTORY OF PRESENT ILLNESS:  Dino Leyva is a 45year old male who presents for follow up for euthyroid MNG. Rfl: 0   TRUEPLUS LANCETS 33G Does not apply Misc Check sugars 2-3 times a day. Disp: 100 each Rfl: 3   carvedilol 12.5 MG Oral Tab Take 1 tablet (12.5 mg total) by mouth 2 (two) times daily with meals.  Disp: 60 tablet Rfl: 5   Dorzolamide HCl 2 % Ophthalm cough  Cardiovascular: Negative for:  chest pain, palpitations, orthopnea  GI: Negative for:  abdominal pain, nausea, vomiting, diarrhea, constipation, bleeding  Neurology: Negative for: headache, numbness, weakness  Genito-Urinary: Negative for: dysuria, proceed with observation as opposed to getting a FNA at this time. Also, I see him for DM. Based on reported BG on this visit, I recommended changing insulin 70/30 to 48 am and 44 pm.   He will send BG in one week.          Orders Placed This Encounter

## 2017-09-18 ENCOUNTER — OFFICE VISIT (OUTPATIENT)
Dept: FAMILY MEDICINE CLINIC | Facility: CLINIC | Age: 52
End: 2017-09-18

## 2017-09-18 VITALS
TEMPERATURE: 97 F | HEIGHT: 69 IN | BODY MASS INDEX: 42.8 KG/M2 | SYSTOLIC BLOOD PRESSURE: 86 MMHG | WEIGHT: 289 LBS | HEART RATE: 82 BPM | DIASTOLIC BLOOD PRESSURE: 58 MMHG

## 2017-09-18 DIAGNOSIS — M50.90 CERVICAL DISC DISEASE: ICD-10-CM

## 2017-09-18 DIAGNOSIS — R07.89 CHEST DISCOMFORT: ICD-10-CM

## 2017-09-18 DIAGNOSIS — R10.13 EPIGASTRIC PAIN: ICD-10-CM

## 2017-09-18 DIAGNOSIS — E11.40 UNCONTROLLED TYPE 2 DIABETES MELLITUS WITH DIABETIC NEUROPATHY, WITH LONG-TERM CURRENT USE OF INSULIN (HCC): ICD-10-CM

## 2017-09-18 DIAGNOSIS — E11.65 UNCONTROLLED TYPE 2 DIABETES MELLITUS WITH DIABETIC NEUROPATHY, WITH LONG-TERM CURRENT USE OF INSULIN (HCC): ICD-10-CM

## 2017-09-18 DIAGNOSIS — I10 ESSENTIAL HYPERTENSION: Primary | ICD-10-CM

## 2017-09-18 DIAGNOSIS — Z79.4 UNCONTROLLED TYPE 2 DIABETES MELLITUS WITH DIABETIC NEUROPATHY, WITH LONG-TERM CURRENT USE OF INSULIN (HCC): ICD-10-CM

## 2017-09-18 PROCEDURE — 93000 ELECTROCARDIOGRAM COMPLETE: CPT | Performed by: FAMILY MEDICINE

## 2017-09-18 PROCEDURE — 99212 OFFICE O/P EST SF 10 MIN: CPT | Performed by: FAMILY MEDICINE

## 2017-09-18 PROCEDURE — 99214 OFFICE O/P EST MOD 30 MIN: CPT | Performed by: FAMILY MEDICINE

## 2017-09-18 PROCEDURE — 93005 ELECTROCARDIOGRAM TRACING: CPT | Performed by: FAMILY MEDICINE

## 2017-09-18 RX ORDER — ACYCLOVIR 400 MG/1
400 TABLET ORAL 2 TIMES DAILY
Qty: 10 TABLET | Refills: 11 | Status: SHIPPED | OUTPATIENT
Start: 2017-09-18 | End: 2018-10-22

## 2017-09-18 RX ORDER — RANITIDINE 150 MG/1
150 TABLET ORAL 2 TIMES DAILY
Qty: 60 TABLET | Refills: 2 | Status: SHIPPED | OUTPATIENT
Start: 2017-09-18 | End: 2018-01-17

## 2017-09-18 NOTE — PROGRESS NOTES
HPI:    Patient ID: Víctor Coreas is a 45year old male. Hypertension   This is a chronic problem. The current episode started more than 1 year ago. The problem is unchanged. The problem is controlled.  Pertinent negatives include no chest pain, headaches highest blood glucose is >200 mg/dl. His overall blood glucose range is >200 mg/dl. An ACE inhibitor/angiotensin II receptor blocker is being taken. Chest Pain    This is a recurrent problem. The current episode started more than 1 month ago.  The onset q Disp: 60 tablet Rfl: 0   BENAZEPRIL-HYDROCHLOROTHIAZIDE 20-12.5 MG Oral Tab TAKE 1 TABLET BY MOUTH EVERY DAY Disp: 30 tablet Rfl: 2   ATORVASTATIN 40 MG Oral Tab TAKE 1 TABLET(40 MG) BY MOUTH DAILY Disp: 90 tablet Rfl: 0   AMITRIPTYLINE HCL 25 MG Oral Tab HCl 2 % Ophthalmic Solution INSTILL 1 DROP INTO OU BID Disp:  Rfl: 6   FLUTICASONE PROPIONATE 50 MCG/ACT Nasal Suspension SHAKE LIQUID AND USE 1 SPRAY IN EACH NOSTRIL TWICE DAILY Disp: 1 Bottle Rfl: 3   pregabalin (LYRICA) 50 MG Oral Cap Take 1 capsule (50 40.0-44.9, adult ([facility] [place] [address]  Recommend weight loss via daily exercising and consistent healthy dietary changes. 4. Herpes genitalia  Refill anti-viral medication    5. Chest discomfort  EKG ordered    6. Cervical disc disease  To physiatry    7.  Epigastric p

## 2017-09-22 RX ORDER — AMITRIPTYLINE HYDROCHLORIDE 25 MG/1
TABLET, FILM COATED ORAL
Qty: 30 TABLET | Refills: 0 | Status: SHIPPED | OUTPATIENT
Start: 2017-09-22 | End: 2018-05-13

## 2017-09-22 NOTE — TELEPHONE ENCOUNTER
Refill Protocol Appointment Criteria  · Appointment scheduled in the past 6 months or in the next 3 months  Recent Outpatient Visits            4 days ago Essential hypertension    [address] [place] [address], [facility],     Office Vi

## 2017-10-02 DIAGNOSIS — I10 ESSENTIAL HYPERTENSION: Primary | ICD-10-CM

## 2017-10-02 RX ORDER — CALCIUM CITRATE/VITAMIN D3 200MG-6.25
TABLET ORAL
Qty: 300 STRIP | Refills: 0 | Status: SHIPPED | OUTPATIENT
Start: 2017-10-02 | End: 2018-01-04

## 2017-10-04 RX ORDER — FUROSEMIDE 40 MG/1
TABLET ORAL
Qty: 60 TABLET | Refills: 2 | Status: SHIPPED | OUTPATIENT
Start: 2017-10-04 | End: 2018-01-04

## 2017-11-29 RX ORDER — POTASSIUM CHLORIDE 600 MG/1
TABLET, FILM COATED, EXTENDED RELEASE ORAL
Qty: 60 TABLET | Refills: 0 | Status: SHIPPED | OUTPATIENT
Start: 2017-11-29 | End: 2018-01-04

## 2017-11-29 NOTE — TELEPHONE ENCOUNTER
· Please advise on refill. Thanks.   Recent Outpatient Visits            2 months ago Essential hypertension    [facility], [facility], [address] [place], Montserrat Oconnell, DO    Office Visit    2 months ago Thyroid nodule    [facility], [facility], [address] [facility]

## 2017-12-01 RX ORDER — BACLOFEN 10 MG/1
TABLET ORAL
Qty: 90 TABLET | Refills: 0 | Status: SHIPPED | OUTPATIENT
Start: 2017-12-01 | End: 2018-02-20

## 2017-12-07 RX ORDER — INSULIN NPH HUM/REG INSULIN HM 70-30/ML
VIAL (ML) SUBCUTANEOUS
Qty: 60 ML | Refills: 0 | Status: SHIPPED | OUTPATIENT
Start: 2017-12-07 | End: 2018-01-27

## 2017-12-07 RX ORDER — ATORVASTATIN CALCIUM 40 MG/1
TABLET, FILM COATED ORAL
Qty: 90 TABLET | Refills: 0 | Status: SHIPPED | OUTPATIENT
Start: 2017-12-07 | End: 2018-01-05

## 2017-12-07 RX ORDER — ATORVASTATIN CALCIUM 40 MG/1
TABLET, FILM COATED ORAL
Qty: 90 TABLET | Refills: 0 | Status: SHIPPED | OUTPATIENT
Start: 2017-12-07 | End: 2018-09-07

## 2017-12-08 RX ORDER — BENAZEPRIL HYDROCHLORIDE AND HYDROCHLOROTHIAZIDE 20; 12.5 MG/1; MG/1
TABLET ORAL
Qty: 30 TABLET | Refills: 0 | Status: SHIPPED | OUTPATIENT
Start: 2017-12-08 | End: 2018-01-04

## 2017-12-08 RX ORDER — MONTELUKAST SODIUM 10 MG/1
TABLET ORAL
Qty: 30 TABLET | Refills: 0 | Status: SHIPPED | OUTPATIENT
Start: 2017-12-08 | End: 2018-02-06

## 2017-12-08 RX ORDER — CARVEDILOL 12.5 MG/1
TABLET ORAL
Qty: 60 TABLET | Refills: 0 | Status: SHIPPED | OUTPATIENT
Start: 2017-12-08 | End: 2018-01-04

## 2017-12-08 NOTE — TELEPHONE ENCOUNTER
Hypertensive Medications  Protocol Criteria:  · Appointment scheduled in the past 6 months or in the next 3 months  · BMP or CMP in the past 12 months  · Creatinine result < 2  Recent Outpatient Visits            2 months ago Essential hypertension    [facility]    [facility], [facility], [facility] Nadia Wallace MD    Office Visit    4 months ago Throat pain in adult    [facility] Elaine Hui MD    Office Visit    5 months ago Uncontrolled type 2 diabetes mellitus with hypergly

## 2017-12-09 ENCOUNTER — TELEPHONE (OUTPATIENT)
Dept: OTOLARYNGOLOGY | Facility: CLINIC | Age: 52
End: 2017-12-09

## 2017-12-09 NOTE — TELEPHONE ENCOUNTER
••  FLUTICASONE PROPIONATE 50 MCG/ACT Nasal Suspension, SHAKE LIQUID AND USE 1 SPRAY IN EACH NOSTRIL TWICE DAILY, Disp: 1 Bottle, Rfl: 3    Rx refill received from [facility]    Patient has [employer] Insurance    EC not contracted with [employer] as of 10/

## 2017-12-11 NOTE — TELEPHONE ENCOUNTER
Pharmacy informed Analy Coronel is not contracted / [facility] (as of 10/1/17). Request for refill of Fluticasone denied; pt will have to request Rx from PCP or obtain new insurance and RTC.

## 2018-01-04 DIAGNOSIS — I10 ESSENTIAL HYPERTENSION: ICD-10-CM

## 2018-01-05 RX ORDER — CALCIUM CITRATE/VITAMIN D3 200MG-6.25
TABLET ORAL
Qty: 300 STRIP | Refills: 0 | Status: SHIPPED | OUTPATIENT
Start: 2018-01-05 | End: 2021-11-22

## 2018-01-05 RX ORDER — POTASSIUM CHLORIDE 600 MG/1
TABLET, FILM COATED, EXTENDED RELEASE ORAL
Qty: 60 TABLET | Refills: 0 | Status: SHIPPED | OUTPATIENT
Start: 2018-01-05 | End: 2018-01-31

## 2018-01-05 RX ORDER — ATORVASTATIN CALCIUM 20 MG/1
TABLET, FILM COATED ORAL
Qty: 90 TABLET | Refills: 0 | OUTPATIENT
Start: 2018-01-05

## 2018-01-05 RX ORDER — FUROSEMIDE 40 MG/1
TABLET ORAL
Qty: 60 TABLET | Refills: 0 | Status: SHIPPED | OUTPATIENT
Start: 2018-01-05 | End: 2018-01-31

## 2018-01-05 RX ORDER — BENAZEPRIL HYDROCHLORIDE AND HYDROCHLOROTHIAZIDE 20; 12.5 MG/1; MG/1
TABLET ORAL
Qty: 30 TABLET | Refills: 0 | Status: SHIPPED | OUTPATIENT
Start: 2018-01-05 | End: 2018-01-31

## 2018-01-05 RX ORDER — ATORVASTATIN CALCIUM 40 MG/1
TABLET, FILM COATED ORAL
Qty: 90 TABLET | Refills: 0 | Status: SHIPPED | OUTPATIENT
Start: 2018-01-05 | End: 2018-01-15

## 2018-01-05 RX ORDER — CARVEDILOL 12.5 MG/1
TABLET ORAL
Qty: 60 TABLET | Refills: 0 | Status: SHIPPED | OUTPATIENT
Start: 2018-01-05 | End: 2018-01-31

## 2018-01-05 NOTE — TELEPHONE ENCOUNTER
please advise as does not meet RN protocol for refill criteria  - KCL  Other meds refilled        Hypertensive Medications  Protocol Criteria:  · Appointment scheduled in the past 6 months or in the next 3 months  · BMP or CMP in the past 12 months  · Cre

## 2018-01-10 ENCOUNTER — TELEPHONE (OUTPATIENT)
Dept: ENDOCRINOLOGY CLINIC | Facility: CLINIC | Age: 53
End: 2018-01-10

## 2018-01-10 ENCOUNTER — OFFICE VISIT (OUTPATIENT)
Dept: ENDOCRINOLOGY CLINIC | Facility: CLINIC | Age: 53
End: 2018-01-10

## 2018-01-10 VITALS
SYSTOLIC BLOOD PRESSURE: 112 MMHG | BODY MASS INDEX: 42.36 KG/M2 | HEIGHT: 69 IN | WEIGHT: 286 LBS | HEART RATE: 84 BPM | DIASTOLIC BLOOD PRESSURE: 68 MMHG

## 2018-01-10 DIAGNOSIS — E04.1 THYROID NODULE: ICD-10-CM

## 2018-01-10 DIAGNOSIS — E11.65 UNCONTROLLED TYPE 2 DIABETES MELLITUS WITH HYPERGLYCEMIA, WITH LONG-TERM CURRENT USE OF INSULIN (HCC): Primary | ICD-10-CM

## 2018-01-10 DIAGNOSIS — E78.5 DYSLIPIDEMIA: ICD-10-CM

## 2018-01-10 DIAGNOSIS — Z79.4 UNCONTROLLED TYPE 2 DIABETES MELLITUS WITH HYPERGLYCEMIA, WITH LONG-TERM CURRENT USE OF INSULIN (HCC): Primary | ICD-10-CM

## 2018-01-10 LAB
CARTRIDGE LOT#: ABNORMAL NUMERIC
GLUCOSE BLOOD: 113
HEMOGLOBIN A1C: 9.6 % (ref 4.3–5.6)
TEST STRIP LOT #: NORMAL NUMERIC

## 2018-01-10 PROCEDURE — 99214 OFFICE O/P EST MOD 30 MIN: CPT | Performed by: INTERNAL MEDICINE

## 2018-01-10 PROCEDURE — 99212 OFFICE O/P EST SF 10 MIN: CPT | Performed by: INTERNAL MEDICINE

## 2018-01-10 PROCEDURE — 82962 GLUCOSE BLOOD TEST: CPT | Performed by: INTERNAL MEDICINE

## 2018-01-10 PROCEDURE — 36416 COLLJ CAPILLARY BLOOD SPEC: CPT | Performed by: INTERNAL MEDICINE

## 2018-01-10 PROCEDURE — 83036 HEMOGLOBIN GLYCOSYLATED A1C: CPT | Performed by: INTERNAL MEDICINE

## 2018-01-10 NOTE — PROGRESS NOTES
Return Office Visit - Diabetes    CHIEF COMPLAINT:    Type 2 Diabetes   Dyslipidemia  Euthyroid MNG    HISTORY OF PRESENT ILLNESS:   Valeria Wesley is a 47year old male who presents for follow up for diabetes.     DM HISTORY  Diagnosed: 2000      HISTORY OF MG) BY MOUTH EVERY NIGHT Disp: 90 tablet Rfl: 0   BACLOFEN 10 MG Oral Tab TAKE 1 TABLET(10 MG) BY MOUTH THREE TIMES DAILY Disp: 90 tablet Rfl: 0   AMITRIPTYLINE HCL 25 MG Oral Tab TAKE 1 TABLET BY MOUTH EVERY NIGHT AT BEDTIME Disp: 30 tablet Rfl: 0   RaNIT mouth 3 (three) times daily.  Disp: 90 capsule Rfl: 0   MetFORMIN HCl  MG Oral Tablet 24 Hr Take 4 tablets (2,000 mg total) by mouth daily with breakfast. (Patient taking differently: Take 500 mg by mouth 2 (two) times daily with meals.  ) Disp: 120 t normal bowel sounds, soft, non-distended, non-tender,   SKIN:  no bruising or bleeding, no rashes and no lesions  DIABETIC FOOT EXAM: normal monofilament sensation, normal pulses, no edema, no skin lesions      DATA:                 HbA1c 11.7 % --> 7.2 % of thyroid nodules in the population approx 50-60% of the population  -Discussed risk of malignancy is approx 3-5%, 95-97% of nodules are benign  -Discussed recommendation to perform FNA biopsy of nodules greater than 1cm in size  -Discussed that if nodule

## 2018-01-10 NOTE — TELEPHONE ENCOUNTER
Called Jorge at [phone]. Submitted. PA has been submitted for further review. Case #: [id number]. Representative states that we can fax additional information to [phone]. Faxed today's OV note and last thyroid US.

## 2018-01-15 ENCOUNTER — OFFICE VISIT (OUTPATIENT)
Dept: OTOLARYNGOLOGY | Facility: CLINIC | Age: 53
End: 2018-01-15

## 2018-01-15 VITALS
DIASTOLIC BLOOD PRESSURE: 79 MMHG | SYSTOLIC BLOOD PRESSURE: 123 MMHG | WEIGHT: 280 LBS | BODY MASS INDEX: 41.47 KG/M2 | HEIGHT: 69 IN | TEMPERATURE: 98 F

## 2018-01-15 DIAGNOSIS — R07.0 THROAT PAIN IN ADULT: Primary | ICD-10-CM

## 2018-01-15 PROCEDURE — 99214 OFFICE O/P EST MOD 30 MIN: CPT | Performed by: OTOLARYNGOLOGY

## 2018-01-15 PROCEDURE — 99212 OFFICE O/P EST SF 10 MIN: CPT | Performed by: OTOLARYNGOLOGY

## 2018-01-15 RX ORDER — METHSCOPOLAMINE BROMIDE 2.5 MG/1
2.5 TABLET ORAL 2 TIMES DAILY
Qty: 60 TABLET | Refills: 3 | Status: SHIPPED | OUTPATIENT
Start: 2018-01-15 | End: 2019-05-28

## 2018-01-15 RX ORDER — FLUTICASONE PROPIONATE 50 MCG
1 SPRAY, SUSPENSION (ML) NASAL 2 TIMES DAILY
Qty: 3 BOTTLE | Refills: 3 | Status: SHIPPED | OUTPATIENT
Start: 2018-01-15 | End: 2019-05-28

## 2018-01-15 RX ORDER — MONTELUKAST SODIUM 10 MG/1
10 TABLET ORAL NIGHTLY
Qty: 90 TABLET | Refills: 3 | Status: SHIPPED | OUTPATIENT
Start: 2018-01-15 | End: 2019-02-15

## 2018-01-15 RX ORDER — LORATADINE 10 MG/1
10 TABLET ORAL DAILY
Qty: 90 TABLET | Refills: 3 | Status: SHIPPED | OUTPATIENT
Start: 2018-01-15 | End: 2018-02-06

## 2018-01-15 NOTE — TELEPHONE ENCOUNTER
PA response received. Request has been denied. Patient does not meet criteria for repeat US at this time: \"The time frame for these follow up [address] studies is as follows.  One, every 6 months for up to 24 months if results of your first [address] for this problem

## 2018-01-15 NOTE — PROGRESS NOTES
Gabriella Wright is a 47year old male. Patient presents with: Follow - Up: regarding throat pain, pain comes and goes   Sinus Problem: constant post nasal drip       HISTORY OF PRESENT ILLNESS  History of laryngeal abscess. Resolved with Clindamycin.  Now ha status:             Spouse name:                       Years of education:                 Number of children:               Social History Main Topics    Smoking status: Never Smoker                                                                S SYSTEMS    System Neg/Pos Details   Constitutional Negative Fatigue, fever and weight loss. ENMT Negative Drooling. Eyes Negative Blurred vision and vision changes. Respiratory Negative Dyspnea and wheezing.    Cardio Negative Chest pain, irregular he Left: Normal. Septum -Normal  Turbinates - Right: Normal, Left: Normal.  Nasal mucosa congested       Current Outpatient Prescriptions:   •  POTASSIUM CHLORIDE ER 8 MEQ Oral Tab CR, TAKE 1 TABLET(8 MEQ) BY MOUTH TWICE DAILY, Disp: 60 tablet, Rfl: 0  •  MARJORIE Does not apply Misc, Use syringes to inject insulin two times per day., Disp: 100 each, Rfl: 2  •  Glucose Blood (TRUE METRIX BLOOD GLUCOSE TEST) In Vitro Strip, Check 2-3 times a day., Disp: 300 each, Rfl: 0  •  TRUEPLUS LANCETS 33G Does not apply Misc, C

## 2018-01-16 ENCOUNTER — TELEPHONE (OUTPATIENT)
Dept: FAMILY MEDICINE CLINIC | Facility: CLINIC | Age: 53
End: 2018-01-16

## 2018-01-16 NOTE — TELEPHONE ENCOUNTER
Zack Roman dropped off a Medical Examiner's Certificate for Disability Benefits - Duty form to be completed. Form and signed release of information form emailed to [email or web address]. ORG, payment not made but pt informed of payment. Original form given to Abdullahi Benoit in [facility].

## 2018-01-16 NOTE — TELEPHONE ENCOUNTER
I guess there is not much we can do, since we have already sent notes etc.   Please inform the patient  Also, he should call if he has any compressive symptoms. Thanks.

## 2018-01-17 DIAGNOSIS — R10.13 EPIGASTRIC PAIN: ICD-10-CM

## 2018-01-17 NOTE — TELEPHONE ENCOUNTER
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 Logged for processing. FCR[employer] prepayment request emailed to pt '[email or web address]' .  NK

## 2018-01-18 RX ORDER — RANITIDINE 150 MG/1
TABLET ORAL
Qty: 60 TABLET | Refills: 0 | Status: SHIPPED | OUTPATIENT
Start: 2018-01-18 | End: 2018-03-04

## 2018-01-18 NOTE — TELEPHONE ENCOUNTER
Gastrointestional Medication:  Refill Protocol Appointment Criteria  · Appointment scheduled in the past 12 months or in the next 3 months  Recent Outpatient Visits            3 days ago Throat pain in adult    [facility] E[facility] Esther Velez,

## 2018-01-25 NOTE — TELEPHONE ENCOUNTER
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 May I extend for 2018?   Please advise, thanks, Sadie Lockett

## 2018-01-26 NOTE — TELEPHONE ENCOUNTER
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 If you approve please sign off. If you do not support please advise and I can revise his form. Also I have the original and I will bring next week for original signature required. Thanks! Johanna    Please sign off on form:  -Highlight the patient and hit \"Chart\" button. -In Chart Review, w/in the Encounter tab - click 1 time on the Telephone call encounter for 01/16/18. Scroll down the telephone encounter.  -Click \"scan on\" blue Hyperlink under \"Media\" heading for PPD Disab. Form Dr. Blanco Shelton 01/26/18 w/in the telephone enc.  -Click on Acknowledge button at the bottom right corner and left-click onto image, signature stamp appears and drag signature to Provider signature line. Stamp will turn blue. Close window.     Thank you,  Francisco

## 2018-01-29 RX ORDER — INSULIN NPH HUM/REG INSULIN HM 70-30/ML
VIAL (ML) SUBCUTANEOUS
Qty: 60 ML | Refills: 0 | Status: SHIPPED | OUTPATIENT
Start: 2018-01-29 | End: 2019-05-28 | Stop reason: ALTCHOICE

## 2018-01-31 DIAGNOSIS — I10 ESSENTIAL HYPERTENSION: ICD-10-CM

## 2018-01-31 RX ORDER — FUROSEMIDE 40 MG/1
TABLET ORAL
Qty: 60 TABLET | Refills: 0 | Status: SHIPPED | OUTPATIENT
Start: 2018-01-31 | End: 2018-02-26

## 2018-01-31 RX ORDER — CARVEDILOL 12.5 MG/1
TABLET ORAL
Qty: 60 TABLET | Refills: 0 | Status: SHIPPED | OUTPATIENT
Start: 2018-01-31 | End: 2018-03-10

## 2018-01-31 RX ORDER — BENAZEPRIL HYDROCHLORIDE AND HYDROCHLOROTHIAZIDE 20; 12.5 MG/1; MG/1
TABLET ORAL
Qty: 30 TABLET | Refills: 0 | Status: SHIPPED | OUTPATIENT
Start: 2018-01-31 | End: 2018-02-26

## 2018-01-31 NOTE — TELEPHONE ENCOUNTER
Hypertensive Medications  Protocol Criteria:  · Appointment scheduled in the past 6 months or in the next 3 months  · BMP or CMP in the past 12 months  · Creatinine result < 2  Recent Outpatient Visits            2 weeks ago Throat pain in adult    Elurs

## 2018-02-01 RX ORDER — POTASSIUM CHLORIDE 600 MG/1
TABLET, FILM COATED, EXTENDED RELEASE ORAL
Qty: 60 TABLET | Refills: 0 | Status: SHIPPED | OUTPATIENT
Start: 2018-02-01 | End: 2018-02-26

## 2018-02-06 ENCOUNTER — LAB ENCOUNTER (OUTPATIENT)
Dept: LAB | Age: 53
End: 2018-02-06
Attending: FAMILY MEDICINE
Payer: MEDICAID

## 2018-02-06 ENCOUNTER — OFFICE VISIT (OUTPATIENT)
Dept: FAMILY MEDICINE CLINIC | Facility: CLINIC | Age: 53
End: 2018-02-06

## 2018-02-06 VITALS
RESPIRATION RATE: 16 BRPM | WEIGHT: 286 LBS | HEART RATE: 83 BPM | SYSTOLIC BLOOD PRESSURE: 110 MMHG | HEIGHT: 69 IN | BODY MASS INDEX: 42.36 KG/M2 | DIASTOLIC BLOOD PRESSURE: 83 MMHG | TEMPERATURE: 98 F

## 2018-02-06 DIAGNOSIS — Z12.5 PROSTATE CANCER SCREENING: ICD-10-CM

## 2018-02-06 DIAGNOSIS — Z00.00 ENCOUNTER FOR SCREENING AND PREVENTATIVE CARE: ICD-10-CM

## 2018-02-06 DIAGNOSIS — E11.9 TYPE 2 DIABETES MELLITUS WITHOUT COMPLICATION, WITHOUT LONG-TERM CURRENT USE OF INSULIN (HCC): ICD-10-CM

## 2018-02-06 DIAGNOSIS — E66.01 MORBID OBESITY WITH BMI OF 40.0-44.9, ADULT (HCC): ICD-10-CM

## 2018-02-06 DIAGNOSIS — Z13.29 THYROID DISORDER SCREEN: ICD-10-CM

## 2018-02-06 DIAGNOSIS — Z83.79 FAMILY HISTORY OF COLITIS: ICD-10-CM

## 2018-02-06 DIAGNOSIS — R19.5 CHANGE IN CONSISTENCY OF STOOL: Primary | ICD-10-CM

## 2018-02-06 DIAGNOSIS — I10 ESSENTIAL HYPERTENSION: ICD-10-CM

## 2018-02-06 LAB
ALBUMIN SERPL BCP-MCNC: 3.9 G/DL (ref 3.5–4.8)
ALBUMIN/GLOB SERPL: 1.3 {RATIO} (ref 1–2)
ALP SERPL-CCNC: 61 U/L (ref 32–100)
ALT SERPL-CCNC: 25 U/L (ref 17–63)
ANION GAP SERPL CALC-SCNC: 11 MMOL/L (ref 0–18)
AST SERPL-CCNC: 21 U/L (ref 15–41)
BASOPHILS # BLD: 0.1 K/UL (ref 0–0.2)
BASOPHILS NFR BLD: 1 %
BILIRUB SERPL-MCNC: 1 MG/DL (ref 0.3–1.2)
BILIRUB UR QL: NEGATIVE
BUN SERPL-MCNC: 8 MG/DL (ref 8–20)
BUN/CREAT SERPL: 8.4 (ref 10–20)
CALCIUM SERPL-MCNC: 9.1 MG/DL (ref 8.5–10.5)
CHLORIDE SERPL-SCNC: 102 MMOL/L (ref 95–110)
CHOLEST SERPL-MCNC: 126 MG/DL (ref 110–200)
CLARITY UR: CLEAR
CO2 SERPL-SCNC: 28 MMOL/L (ref 22–32)
COLOR UR: YELLOW
CREAT SERPL-MCNC: 0.95 MG/DL (ref 0.5–1.5)
EOSINOPHIL # BLD: 0.2 K/UL (ref 0–0.7)
EOSINOPHIL NFR BLD: 4 %
ERYTHROCYTE [DISTWIDTH] IN BLOOD BY AUTOMATED COUNT: 14.1 % (ref 11–15)
GLOBULIN PLAS-MCNC: 3.1 G/DL (ref 2.5–3.7)
GLUCOSE SERPL-MCNC: 64 MG/DL (ref 70–99)
GLUCOSE UR-MCNC: NEGATIVE MG/DL
HCT VFR BLD AUTO: 40.6 % (ref 41–52)
HDLC SERPL-MCNC: 43 MG/DL
HGB BLD-MCNC: 13.3 G/DL (ref 13.5–17.5)
KETONES UR-MCNC: NEGATIVE MG/DL
LDLC SERPL CALC-MCNC: 62 MG/DL (ref 0–99)
LEUKOCYTE ESTERASE UR QL STRIP.AUTO: NEGATIVE
LYMPHOCYTES # BLD: 1.8 K/UL (ref 1–4)
LYMPHOCYTES NFR BLD: 28 %
MCH RBC QN AUTO: 27.4 PG (ref 27–32)
MCHC RBC AUTO-ENTMCNC: 32.8 G/DL (ref 32–37)
MCV RBC AUTO: 83.6 FL (ref 80–100)
MONOCYTES # BLD: 0.5 K/UL (ref 0–1)
MONOCYTES NFR BLD: 7 %
NEUTROPHILS # BLD AUTO: 3.8 K/UL (ref 1.8–7.7)
NEUTROPHILS NFR BLD: 60 %
NITRITE UR QL STRIP.AUTO: NEGATIVE
NONHDLC SERPL-MCNC: 83 MG/DL
OSMOLALITY UR CALC.SUM OF ELEC: 288 MOSM/KG (ref 275–295)
PATIENT FASTING: YES
PH UR: 6 [PH] (ref 5–8)
PLATELET # BLD AUTO: 206 K/UL (ref 140–400)
PMV BLD AUTO: 10.4 FL (ref 7.4–10.3)
POTASSIUM SERPL-SCNC: 3.4 MMOL/L (ref 3.3–5.1)
PROT SERPL-MCNC: 7 G/DL (ref 5.9–8.4)
PROT UR-MCNC: NEGATIVE MG/DL
PSA SERPL-MCNC: 4.9 NG/ML (ref 0–4)
RBC # BLD AUTO: 4.86 M/UL (ref 4.5–5.9)
RBC #/AREA URNS AUTO: 0 /HPF
SODIUM SERPL-SCNC: 141 MMOL/L (ref 136–144)
SP GR UR STRIP: 1.01 (ref 1–1.03)
TRIGL SERPL-MCNC: 104 MG/DL (ref 1–149)
TSH SERPL-ACNC: 1.57 UIU/ML (ref 0.45–5.33)
UROBILINOGEN UR STRIP-ACNC: <2
VIT C UR-MCNC: NEGATIVE MG/DL
WBC # BLD AUTO: 6.3 K/UL (ref 4–11)
WBC #/AREA URNS AUTO: 0 /HPF

## 2018-02-06 PROCEDURE — 83036 HEMOGLOBIN GLYCOSYLATED A1C: CPT

## 2018-02-06 PROCEDURE — 81001 URINALYSIS AUTO W/SCOPE: CPT

## 2018-02-06 PROCEDURE — 84443 ASSAY THYROID STIM HORMONE: CPT

## 2018-02-06 PROCEDURE — 99214 OFFICE O/P EST MOD 30 MIN: CPT | Performed by: FAMILY MEDICINE

## 2018-02-06 PROCEDURE — 85025 COMPLETE CBC W/AUTO DIFF WBC: CPT

## 2018-02-06 PROCEDURE — 93000 ELECTROCARDIOGRAM COMPLETE: CPT | Performed by: FAMILY MEDICINE

## 2018-02-06 PROCEDURE — 80053 COMPREHEN METABOLIC PANEL: CPT

## 2018-02-06 PROCEDURE — 99396 PREV VISIT EST AGE 40-64: CPT | Performed by: FAMILY MEDICINE

## 2018-02-06 PROCEDURE — 93005 ELECTROCARDIOGRAM TRACING: CPT | Performed by: FAMILY MEDICINE

## 2018-02-06 PROCEDURE — 80061 LIPID PANEL: CPT

## 2018-02-06 PROCEDURE — 36415 COLL VENOUS BLD VENIPUNCTURE: CPT

## 2018-02-06 RX ORDER — ACYCLOVIR 400 MG/1
400 TABLET ORAL DAILY
Refills: 11 | COMMUNITY
Start: 2018-01-27 | End: 2019-03-05

## 2018-02-06 NOTE — PATIENT INSTRUCTIONS
Recommend weight loss via daily exercising and consistent healthy dietary changes. Monitor blood pressures and record at home. Limit salt intake. Comply with medications. Medication reviewed and renewed where needed and appropriate.   All diabetic labs a

## 2018-02-06 NOTE — PROGRESS NOTES
HPI:    Patient ID: Ronn Waterman is a 47year old male.     47year old AA male here for complete preventive care physical and for status update on any confirmed chronic medical illnesses and follow up on any previous labs or procedures that were suggestive pain and palpitations. Genitourinary: Positive for impotence. Neurological: Negative for headaches.             Current Outpatient Prescriptions:  POTASSIUM CHLORIDE ER 8 MEQ Oral Tab CR TAKE 1 TABLET(8 MEQ) BY MOUTH TWICE DAILY Disp: 60 tablet Rfl: 0 Methscopolamine Bromide 2.5 MG Oral Tab Take 1 tablet (2.5 mg total) by mouth 2 (two) times daily. Disp: 60 tablet Rfl: 3   Fluticasone Propionate 50 MCG/ACT Nasal Suspension 1 spray by Nasal route 2 (two) times daily.  Disp: 3 Bottle Rfl: 3   TRUE METRIX clear and moist.   Neck: No thyromegaly present. Cardiovascular: Normal rate and regular rhythm. Exam reveals no gallop. Edema not present. Carotid bruit not present. Pulmonary/Chest: Effort normal and breath sounds normal. No respiratory distress. GI appointment. Return in about 3 weeks (around 2/27/2018), or if symptoms worsen or fail to improve.          RT#[id number]

## 2018-02-07 LAB — HBA1C MFR BLD: 8.7 % (ref 4–6)

## 2018-02-07 NOTE — TELEPHONE ENCOUNTER
Form completed pts disability co requires the orig. 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 Pt into OPO and paid and I mailed the orig form to him at his home address per his requests.  No addl action is needed,  MARLENE

## 2018-02-23 RX ORDER — BACLOFEN 10 MG/1
TABLET ORAL
Qty: 90 TABLET | Refills: 0 | Status: SHIPPED | OUTPATIENT
Start: 2018-02-23 | End: 2018-03-25

## 2018-02-23 NOTE — TELEPHONE ENCOUNTER
Refill Protocol Appointment Criteria  · Appointment scheduled in the past 6 months or in the next 3 months  Recent Outpatient Visits            2 weeks ago Change in consistency of stool    [facility], [facility], [address], [place], [address], [place]

## 2018-02-26 DIAGNOSIS — I10 ESSENTIAL HYPERTENSION: ICD-10-CM

## 2018-02-28 RX ORDER — POTASSIUM CHLORIDE 600 MG/1
TABLET, FILM COATED, EXTENDED RELEASE ORAL
Qty: 60 TABLET | Refills: 0 | Status: SHIPPED | OUTPATIENT
Start: 2018-02-28 | End: 2018-03-28

## 2018-02-28 RX ORDER — BENAZEPRIL HYDROCHLORIDE AND HYDROCHLOROTHIAZIDE 20; 12.5 MG/1; MG/1
TABLET ORAL
Qty: 30 TABLET | Refills: 0 | Status: SHIPPED | OUTPATIENT
Start: 2018-02-28 | End: 2018-03-28

## 2018-02-28 RX ORDER — FUROSEMIDE 40 MG/1
TABLET ORAL
Qty: 60 TABLET | Refills: 0 | Status: SHIPPED | OUTPATIENT
Start: 2018-02-28 | End: 2018-03-28

## 2018-02-28 NOTE — TELEPHONE ENCOUNTER
LOV: 2-6-18 Last Rx: 2-1-18    No protocol     Please advise in regards to refill request. Thank You        Hypertensive Medications: Refilled per protocol    Protocol Criteria:  · Appointment scheduled in the past 6 months or in the next 3 months  · BMP o

## 2018-03-04 DIAGNOSIS — R10.13 EPIGASTRIC PAIN: ICD-10-CM

## 2018-03-05 RX ORDER — RANITIDINE 150 MG/1
TABLET ORAL
Qty: 60 TABLET | Refills: 0 | Status: SHIPPED | OUTPATIENT
Start: 2018-03-05 | End: 2018-04-10

## 2018-03-05 NOTE — TELEPHONE ENCOUNTER
Refill Protocol Appointment Criteria  · Appointment scheduled in the past 12 months or in the next 3 months  Recent Outpatient Visits            3 weeks ago Change in consistency of stool    [address], [place] [address], [place], [address], [place]

## 2018-03-10 DIAGNOSIS — I10 HYPERTENSION, UNSPECIFIED TYPE: Primary | ICD-10-CM

## 2018-03-10 RX ORDER — AMLODIPINE BESYLATE 10 MG/1
TABLET ORAL
Qty: 180 TABLET | Refills: 0 | Status: SHIPPED | OUTPATIENT
Start: 2018-03-10 | End: 2018-09-02

## 2018-03-10 RX ORDER — CARVEDILOL 12.5 MG/1
TABLET ORAL
Qty: 60 TABLET | Refills: 0 | Status: SHIPPED | OUTPATIENT
Start: 2018-03-10 | End: 2018-05-03

## 2018-03-15 NOTE — TELEPHONE ENCOUNTER
LOV 1/10/18 - F/U 4/11/18    Pended for provider    Per LOV \"c/w insulin 70/30 to 35 in the morning and c/w 32 in the evening.      He unruly take 4-6 units extra when he has a high carbohydrate meal\"

## 2018-03-26 RX ORDER — BACLOFEN 10 MG/1
TABLET ORAL
Qty: 90 TABLET | Refills: 0 | Status: SHIPPED | OUTPATIENT
Start: 2018-03-26 | End: 2018-06-24

## 2018-03-27 NOTE — TELEPHONE ENCOUNTER
Please advise on refill request.     Refill Protocol Appointment Criteria  · Appointment scheduled in the past 6 months or in the next 3 months  Recent Outpatient Visits            1 month ago Change in consistency of stool    [facility], [facility], [address],

## 2018-03-28 DIAGNOSIS — I10 ESSENTIAL HYPERTENSION: ICD-10-CM

## 2018-03-31 RX ORDER — FUROSEMIDE 40 MG/1
TABLET ORAL
Qty: 60 TABLET | Refills: 0 | Status: SHIPPED | OUTPATIENT
Start: 2018-03-31 | End: 2018-04-27

## 2018-03-31 RX ORDER — BENAZEPRIL HYDROCHLORIDE AND HYDROCHLOROTHIAZIDE 20; 12.5 MG/1; MG/1
TABLET ORAL
Qty: 30 TABLET | Refills: 0 | Status: SHIPPED | OUTPATIENT
Start: 2018-03-31 | End: 2018-04-27

## 2018-03-31 RX ORDER — POTASSIUM CHLORIDE 600 MG/1
TABLET, FILM COATED, EXTENDED RELEASE ORAL
Qty: 60 TABLET | Refills: 0 | Status: SHIPPED | OUTPATIENT
Start: 2018-03-31 | End: 2018-04-27

## 2018-03-31 NOTE — TELEPHONE ENCOUNTER
Hypertensive Medications  Protocol Criteria:  · Appointment scheduled in the past 6 months or in the next 3 months  · BMP or CMP in the past 12 months  · Creatinine result < 2  Recent Outpatient Visits            1 month ago Change in consistency of stool Name: FUROSEMIDE 40MG TABLETS] 60 tablet 0     Sig: TAKE 2 TABLETS(80 MG) BY MOUTH DAILY             Unable to refill per protocol due for ov.   pls advise, thanks.      Future Appointments  Date Time Provider Mandy Patel   4/11/2018 11:30 AM Jairo

## 2018-04-10 DIAGNOSIS — R10.13 EPIGASTRIC PAIN: ICD-10-CM

## 2018-04-11 RX ORDER — RANITIDINE 150 MG/1
TABLET ORAL
Qty: 60 TABLET | Refills: 0 | Status: SHIPPED | OUTPATIENT
Start: 2018-04-11 | End: 2018-05-31

## 2018-04-11 NOTE — TELEPHONE ENCOUNTER
Refill Protocol Appointment Criteria  · Appointment scheduled in the past 12 months or in the next 3 months  Recent Outpatient Visits            2 months ago Change in consistency of stool    [facility], [facility], [address], [place], [address], [place]

## 2018-04-27 DIAGNOSIS — I10 ESSENTIAL HYPERTENSION: ICD-10-CM

## 2018-04-30 RX ORDER — FUROSEMIDE 40 MG/1
TABLET ORAL
Qty: 60 TABLET | Refills: 0 | Status: SHIPPED | OUTPATIENT
Start: 2018-04-30 | End: 2018-05-29

## 2018-04-30 RX ORDER — BENAZEPRIL HYDROCHLORIDE AND HYDROCHLOROTHIAZIDE 20; 12.5 MG/1; MG/1
TABLET ORAL
Qty: 30 TABLET | Refills: 0 | Status: SHIPPED | OUTPATIENT
Start: 2018-04-30 | End: 2018-05-29

## 2018-04-30 RX ORDER — POTASSIUM CHLORIDE 600 MG/1
TABLET, FILM COATED, EXTENDED RELEASE ORAL
Qty: 60 TABLET | Refills: 0 | Status: SHIPPED | OUTPATIENT
Start: 2018-04-30 | End: 2018-05-29

## 2018-05-04 RX ORDER — NAPROXEN SODIUM 220 MG
TABLET ORAL
Qty: 100 EACH | Refills: 5 | Status: SHIPPED | OUTPATIENT
Start: 2018-05-04 | End: 2019-07-01

## 2018-05-14 RX ORDER — AMITRIPTYLINE HYDROCHLORIDE 25 MG/1
TABLET, FILM COATED ORAL
Qty: 30 TABLET | Refills: 2 | Status: SHIPPED | OUTPATIENT
Start: 2018-05-14 | End: 2018-08-06

## 2018-05-14 NOTE — TELEPHONE ENCOUNTER
Please advise on refill request.     Hypertensive Medications  Protocol Criteria:  · Appointment scheduled in the past 6 months or in the next 3 months  · BMP or CMP in the past 12 months  · Creatinine result < 2  Recent Outpatient Visits            3 maría

## 2018-05-29 DIAGNOSIS — R10.13 EPIGASTRIC PAIN: ICD-10-CM

## 2018-05-29 DIAGNOSIS — I10 ESSENTIAL HYPERTENSION: ICD-10-CM

## 2018-05-31 RX ORDER — POTASSIUM CHLORIDE 600 MG/1
TABLET, FILM COATED, EXTENDED RELEASE ORAL
Qty: 60 TABLET | Refills: 0 | Status: SHIPPED | OUTPATIENT
Start: 2018-05-31 | End: 2018-06-25

## 2018-05-31 RX ORDER — FUROSEMIDE 40 MG/1
TABLET ORAL
Qty: 60 TABLET | Refills: 0 | Status: SHIPPED | OUTPATIENT
Start: 2018-05-31 | End: 2018-06-25

## 2018-05-31 RX ORDER — RANITIDINE 150 MG/1
TABLET ORAL
Qty: 60 TABLET | Refills: 0 | Status: SHIPPED | OUTPATIENT
Start: 2018-05-31 | End: 2018-07-05

## 2018-05-31 RX ORDER — BENAZEPRIL HYDROCHLORIDE AND HYDROCHLOROTHIAZIDE 20; 12.5 MG/1; MG/1
TABLET ORAL
Qty: 30 TABLET | Refills: 0 | Status: SHIPPED | OUTPATIENT
Start: 2018-05-31 | End: 2018-06-25

## 2018-05-31 NOTE — TELEPHONE ENCOUNTER
LOV: 2/6/18, LR: 4/30/18, script pended. Please advise.       Hypertensive Medications  Protocol Criteria:  · Appointment scheduled in the past 6 months or in the next 3 months  · BMP or CMP in the past 12 months  · Creatinine result < 2  Recent Outpatient

## 2018-06-24 RX ORDER — BACLOFEN 10 MG/1
TABLET ORAL
Qty: 90 TABLET | Refills: 0 | Status: SHIPPED | OUTPATIENT
Start: 2018-06-24 | End: 2018-07-25

## 2018-06-25 DIAGNOSIS — I10 ESSENTIAL HYPERTENSION: ICD-10-CM

## 2018-06-25 RX ORDER — POTASSIUM CHLORIDE 600 MG/1
TABLET, FILM COATED, EXTENDED RELEASE ORAL
Qty: 60 TABLET | Refills: 2 | Status: SHIPPED | OUTPATIENT
Start: 2018-06-25 | End: 2018-09-17

## 2018-06-25 RX ORDER — BENAZEPRIL HYDROCHLORIDE AND HYDROCHLOROTHIAZIDE 20; 12.5 MG/1; MG/1
TABLET ORAL
Qty: 30 TABLET | Refills: 2 | Status: SHIPPED | OUTPATIENT
Start: 2018-06-25 | End: 2018-09-17

## 2018-06-25 RX ORDER — FUROSEMIDE 40 MG/1
TABLET ORAL
Qty: 60 TABLET | Refills: 2 | Status: SHIPPED | OUTPATIENT
Start: 2018-06-25 | End: 2018-09-17

## 2018-06-26 NOTE — TELEPHONE ENCOUNTER
Rx approved for 90 days per protocol for benazepril and furosemide.   Please advise on refill request for potassium      Hypertensive Medications  Protocol Criteria:  · Appointment scheduled in the past 6 months or in the next 3 months  · BMP or CMP in the

## 2018-07-03 DIAGNOSIS — I10 HYPERTENSION, UNSPECIFIED TYPE: ICD-10-CM

## 2018-07-03 RX ORDER — CARVEDILOL 12.5 MG/1
TABLET ORAL
Qty: 60 TABLET | Refills: 0 | Status: SHIPPED | OUTPATIENT
Start: 2018-07-03 | End: 2018-09-02

## 2018-07-04 NOTE — TELEPHONE ENCOUNTER
Hypertensive Medications  Protocol Criteria:  · Appointment scheduled in the past 6 months or in the next 3 months  · BMP or CMP in the past 12 months  · Creatinine result < 2  Recent Outpatient Visits            4 months ago Change in consistency of stool

## 2018-07-05 DIAGNOSIS — R10.13 EPIGASTRIC PAIN: ICD-10-CM

## 2018-07-05 RX ORDER — RANITIDINE 150 MG/1
TABLET ORAL
Qty: 180 TABLET | Refills: 0 | Status: SHIPPED | OUTPATIENT
Start: 2018-07-05 | End: 2020-07-15 | Stop reason: ALTCHOICE

## 2018-07-06 NOTE — TELEPHONE ENCOUNTER
Refill Protocol Appointment Criteria  · Appointment scheduled in the past 12 months or in the next 3 months  Recent Outpatient Visits            4 months ago Change in consistency of stool    [address] [facility], [address] [place], [place]

## 2018-07-25 RX ORDER — BACLOFEN 10 MG/1
TABLET ORAL
Qty: 90 TABLET | Refills: 0 | Status: SHIPPED | OUTPATIENT
Start: 2018-07-25 | End: 2018-07-25

## 2018-07-25 RX ORDER — BACLOFEN 10 MG/1
TABLET ORAL
Qty: 270 TABLET | Refills: 0 | Status: SHIPPED | OUTPATIENT
Start: 2018-07-25 | End: 2018-10-23

## 2018-07-25 NOTE — TELEPHONE ENCOUNTER
LOV: 2-6-18 Last Rx: 6-24-18    No protocol     Please advise in regards to refill request. Thank You

## 2018-08-07 RX ORDER — AMITRIPTYLINE HYDROCHLORIDE 25 MG/1
TABLET, FILM COATED ORAL
Qty: 90 TABLET | Refills: 0 | Status: SHIPPED | OUTPATIENT
Start: 2018-08-07 | End: 2018-10-31

## 2018-08-07 NOTE — TELEPHONE ENCOUNTER
Refill Protocol Appointment Criteria  · Appointment scheduled in the past 6 months or in the next 3 months  Recent Outpatient Visits            6 months ago Change in consistency of stool    [facility], [facility], [address], [place], [address], [place]

## 2018-09-02 DIAGNOSIS — I10 HYPERTENSION, UNSPECIFIED TYPE: ICD-10-CM

## 2018-09-02 RX ORDER — AMLODIPINE BESYLATE 10 MG/1
TABLET ORAL
Qty: 180 TABLET | Refills: 0 | Status: SHIPPED | OUTPATIENT
Start: 2018-09-02 | End: 2019-03-24

## 2018-09-02 RX ORDER — CARVEDILOL 12.5 MG/1
TABLET ORAL
Qty: 180 TABLET | Refills: 0 | Status: SHIPPED | OUTPATIENT
Start: 2018-09-02 | End: 2018-12-04

## 2018-09-03 NOTE — TELEPHONE ENCOUNTER
Hypertensive Medications  Protocol Criteria:  · Appointment scheduled in the past 6 months or in the next 3 months  · BMP or CMP in the past 12 months  · Creatinine result < 2  Recent Outpatient Visits            6 months ago Change in consistency of stool

## 2018-09-06 DIAGNOSIS — E78.5 HYPERLIPIDEMIA, UNSPECIFIED HYPERLIPIDEMIA TYPE: Primary | ICD-10-CM

## 2018-09-07 RX ORDER — ATORVASTATIN CALCIUM 40 MG/1
TABLET, FILM COATED ORAL
Qty: 90 TABLET | Refills: 0 | Status: SHIPPED | OUTPATIENT
Start: 2018-09-07 | End: 2018-12-04

## 2018-09-07 NOTE — TELEPHONE ENCOUNTER
Refilled  He has been having insurance issues. He is expecting his insurance to kick in by the endo of the month. He will call ad make an apt at that time.

## 2018-09-13 ENCOUNTER — TELEPHONE (OUTPATIENT)
Dept: FAMILY MEDICINE CLINIC | Facility: CLINIC | Age: 53
End: 2018-09-13

## 2018-09-13 RX ORDER — HUMAN INSULIN 100 [USP'U]/ML
32 INJECTION, SUSPENSION SUBCUTANEOUS
Qty: 3 ML | Refills: 3 | Status: SHIPPED | OUTPATIENT
Start: 2018-09-13 | End: 2019-08-20

## 2018-09-13 NOTE — TELEPHONE ENCOUNTER
Per pharmacy pt insurance does not cover Humulin 70/30 Insulin. Preferred alternative is Novolin Inj, please fax updated script to pharmacy.

## 2018-09-17 DIAGNOSIS — I10 ESSENTIAL HYPERTENSION: ICD-10-CM

## 2018-09-18 RX ORDER — POTASSIUM CHLORIDE 600 MG/1
TABLET, FILM COATED, EXTENDED RELEASE ORAL
Qty: 60 TABLET | Refills: 0 | Status: SHIPPED | OUTPATIENT
Start: 2018-09-18 | End: 2018-10-14

## 2018-09-18 RX ORDER — BENAZEPRIL HYDROCHLORIDE AND HYDROCHLOROTHIAZIDE 20; 12.5 MG/1; MG/1
TABLET ORAL
Qty: 30 TABLET | Refills: 0 | Status: SHIPPED | OUTPATIENT
Start: 2018-09-18 | End: 2018-10-14

## 2018-09-18 RX ORDER — FUROSEMIDE 40 MG/1
TABLET ORAL
Qty: 60 TABLET | Refills: 0 | Status: SHIPPED | OUTPATIENT
Start: 2018-09-18 | End: 2018-10-14

## 2018-09-18 NOTE — TELEPHONE ENCOUNTER
Requested Prescriptions     Pending Prescriptions Disp Refills   • POTASSIUM CHLORIDE ER 8 MEQ Oral Tab CR [Pharmacy Med Name: POTASSIUM CHLORIDE ER 8MEQ TABLETS] 60 tablet 0     Sig: TAKE 1 TABLET(8 MEQ) BY MOUTH TWICE DAILY   • BENAZEPRIL-HYDROCHLOROTHIA 02/06/2018     02/06/2018    K 3.4 02/06/2018     02/06/2018    CO2 28 02/06/2018    GLOBULIN 3.1 02/06/2018    AGRATIO 1.2 08/05/2016    ANIONGAP 11 02/06/2018    OSMOCALC 288 02/06/2018

## 2018-10-14 DIAGNOSIS — I10 ESSENTIAL HYPERTENSION: ICD-10-CM

## 2018-10-15 RX ORDER — BENAZEPRIL HYDROCHLORIDE AND HYDROCHLOROTHIAZIDE 20; 12.5 MG/1; MG/1
TABLET ORAL
Qty: 30 TABLET | Refills: 2 | Status: SHIPPED | OUTPATIENT
Start: 2018-10-15 | End: 2018-11-16

## 2018-10-15 RX ORDER — POTASSIUM CHLORIDE 600 MG/1
TABLET, FILM COATED, EXTENDED RELEASE ORAL
Qty: 60 TABLET | Refills: 2 | Status: SHIPPED | OUTPATIENT
Start: 2018-10-15 | End: 2018-11-16

## 2018-10-15 RX ORDER — FUROSEMIDE 40 MG/1
TABLET ORAL
Qty: 60 TABLET | Refills: 2 | Status: SHIPPED | OUTPATIENT
Start: 2018-10-15 | End: 2018-11-16

## 2018-10-15 NOTE — TELEPHONE ENCOUNTER
Please review; protocol failed.   Hypertensive Medications  Protocol Criteria:  · Appointment scheduled in the past 6 months or in the next 3 months  · BMP or CMP in the past 12 months  · Creatinine result < 2  Recent Outpatient Visits            8 months a Tomas Sewell MD    Office Visit    1 year ago Essential hypertension    [facility], Saravanan Pino Robbie Haws, DO    Office Visit    1 year ago Thyroid nodule    [facility], [facility], Diane Pino

## 2018-10-22 RX ORDER — ACYCLOVIR 400 MG/1
TABLET ORAL
Qty: 10 TABLET | Refills: 0 | Status: SHIPPED | OUTPATIENT
Start: 2018-10-22 | End: 2020-07-15 | Stop reason: ALTCHOICE

## 2018-10-23 RX ORDER — BACLOFEN 10 MG/1
TABLET ORAL
Qty: 270 TABLET | Refills: 0 | Status: SHIPPED | OUTPATIENT
Start: 2018-10-23 | End: 2019-01-23

## 2018-10-23 NOTE — TELEPHONE ENCOUNTER
Review pended refill request as it does not fall under a protocol.     Last Rx: medication never prescribed by Dr. Aysha Guaman: 02/06/18    Refill Protocol Appointment Criteria  · Appointment scheduled in the past 6 months or in the next 3 months  Recent

## 2018-10-23 NOTE — TELEPHONE ENCOUNTER
Review pended refill request as it does not fall under a protocol.     Last Rx: 07/25/18  LOV: 02/06/18    Refill Protocol Appointment Criteria  · Appointment scheduled in the past 6 months or in the next 3 months  Recent Outpatient Visits            8 maría

## 2018-11-03 NOTE — TELEPHONE ENCOUNTER
Please review; protocol failed.     Refill Protocol Appointment Criteria  · Appointment scheduled in the past 6 months or in the next 3 months  Recent Outpatient Visits            8 months ago Change in consistency of stool    [facility], [facility], [address],

## 2018-11-04 RX ORDER — AMITRIPTYLINE HYDROCHLORIDE 25 MG/1
TABLET, FILM COATED ORAL
Qty: 90 TABLET | Refills: 0 | Status: SHIPPED | OUTPATIENT
Start: 2018-11-04 | End: 2019-01-31

## 2018-11-16 DIAGNOSIS — I10 ESSENTIAL HYPERTENSION: ICD-10-CM

## 2018-11-17 RX ORDER — BENAZEPRIL HYDROCHLORIDE AND HYDROCHLOROTHIAZIDE 20; 12.5 MG/1; MG/1
TABLET ORAL
Qty: 90 TABLET | Refills: 2 | Status: SHIPPED | OUTPATIENT
Start: 2018-11-17 | End: 2019-06-15

## 2018-11-17 RX ORDER — FUROSEMIDE 40 MG/1
TABLET ORAL
Qty: 180 TABLET | Refills: 2 | Status: SHIPPED | OUTPATIENT
Start: 2018-11-17 | End: 2019-08-17

## 2018-11-17 RX ORDER — POTASSIUM CHLORIDE 600 MG/1
TABLET, FILM COATED, EXTENDED RELEASE ORAL
Qty: 180 TABLET | Refills: 2 | Status: SHIPPED | OUTPATIENT
Start: 2018-11-17 | End: 2019-04-22

## 2018-12-04 DIAGNOSIS — E78.5 HYPERLIPIDEMIA, UNSPECIFIED HYPERLIPIDEMIA TYPE: ICD-10-CM

## 2018-12-04 DIAGNOSIS — I10 HYPERTENSION, UNSPECIFIED TYPE: ICD-10-CM

## 2018-12-04 RX ORDER — ATORVASTATIN CALCIUM 40 MG/1
TABLET, FILM COATED ORAL
Qty: 90 TABLET | Refills: 0 | Status: SHIPPED | OUTPATIENT
Start: 2018-12-04 | End: 2019-02-15

## 2018-12-04 NOTE — TELEPHONE ENCOUNTER
LOV 1/10/18 -No F/U (RTC 3 mos)    Sent mychart requesting pt make apt    Pended statin med for provider

## 2018-12-05 RX ORDER — CARVEDILOL 12.5 MG/1
TABLET ORAL
Qty: 180 TABLET | Refills: 0 | Status: SHIPPED | OUTPATIENT
Start: 2018-12-05 | End: 2019-03-04

## 2018-12-05 NOTE — TELEPHONE ENCOUNTER
Please review; protocol failed.     Hypertensive Medications  Protocol Criteria:  · Appointment scheduled in the past 6 months or in the next 3 months  · BMP or CMP in the past 12 months  · Creatinine result < 2  Recent Outpatient Visits            10 month

## 2019-01-23 ENCOUNTER — TELEPHONE (OUTPATIENT)
Dept: OTOLARYNGOLOGY | Facility: CLINIC | Age: 54
End: 2019-01-23

## 2019-01-23 NOTE — TELEPHONE ENCOUNTER
•  Montelukast Sodium 10 MG Oral Tab, Take 1 tablet (10 mg total) by mouth nightly., Disp: 90 tablet, Rfl: 3    RX refill received from [address]:  1/15/18

## 2019-01-24 RX ORDER — BACLOFEN 10 MG/1
TABLET ORAL
Qty: 270 TABLET | Refills: 0 | Status: SHIPPED | OUTPATIENT
Start: 2019-01-24 | End: 2019-04-27

## 2019-01-24 NOTE — TELEPHONE ENCOUNTER
No Protocol on this med.        Requested Prescriptions     Pending Prescriptions Disp Refills   • BACLOFEN 10 MG Oral Tab [Pharmacy Med Name: BACLOFEN 10MG TABLETS] 270 tablet 0     Sig: TAKE 1 TABLET(10 MG) BY MOUTH THREE TIMES DAILY       Last Office Vis

## 2019-02-01 RX ORDER — AMITRIPTYLINE HYDROCHLORIDE 25 MG/1
TABLET, FILM COATED ORAL
Qty: 90 TABLET | Refills: 0 | Status: SHIPPED | OUTPATIENT
Start: 2019-02-01 | End: 2019-04-27

## 2019-02-01 NOTE — TELEPHONE ENCOUNTER
Refill Protocol Appointment Criteria  · Appointment scheduled in the past 6 months or in the next 3 months  Recent Outpatient Visits            11 months ago Change in consistency of stool    [facility], [facility], [address], [place], [address], [place]

## 2019-02-25 ENCOUNTER — TELEPHONE (OUTPATIENT)
Dept: FAMILY MEDICINE CLINIC | Facility: CLINIC | Age: 54
End: 2019-02-25

## 2019-02-25 NOTE — TELEPHONE ENCOUNTER
Pt dropped off disability form to be completed, pt signed HIPAA+FCR+pt unable to pay $25 processing fee at this time but will pay at the time of upcoming OV with [facility] on 3/5/19.  Forms scanned to [employer]

## 2019-02-26 NOTE — TELEPHONE ENCOUNTER
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 Logged for processing.  NK

## 2019-03-04 DIAGNOSIS — I10 HYPERTENSION, UNSPECIFIED TYPE: ICD-10-CM

## 2019-03-05 ENCOUNTER — OFFICE VISIT (OUTPATIENT)
Dept: FAMILY MEDICINE CLINIC | Facility: CLINIC | Age: 54
End: 2019-03-05

## 2019-03-05 VITALS
BODY MASS INDEX: 42.65 KG/M2 | SYSTOLIC BLOOD PRESSURE: 110 MMHG | HEIGHT: 69 IN | DIASTOLIC BLOOD PRESSURE: 72 MMHG | HEART RATE: 81 BPM | WEIGHT: 288 LBS | RESPIRATION RATE: 17 BRPM

## 2019-03-05 DIAGNOSIS — E11.9 TYPE 2 DIABETES MELLITUS WITHOUT COMPLICATION, WITHOUT LONG-TERM CURRENT USE OF INSULIN (HCC): ICD-10-CM

## 2019-03-05 DIAGNOSIS — M50.90 CERVICAL DISC DISEASE: ICD-10-CM

## 2019-03-05 DIAGNOSIS — Z98.890 HISTORY OF LAMINECTOMY: ICD-10-CM

## 2019-03-05 DIAGNOSIS — R60.0 BILATERAL LEG EDEMA: ICD-10-CM

## 2019-03-05 DIAGNOSIS — R10.11 RUQ PAIN: ICD-10-CM

## 2019-03-05 DIAGNOSIS — R93.1 ABNORMAL ECHOCARDIOGRAM: ICD-10-CM

## 2019-03-05 DIAGNOSIS — H40.9 GLAUCOMA OF BOTH EYES, UNSPECIFIED GLAUCOMA TYPE: Primary | ICD-10-CM

## 2019-03-05 DIAGNOSIS — I10 ESSENTIAL HYPERTENSION: ICD-10-CM

## 2019-03-05 PROCEDURE — 99214 OFFICE O/P EST MOD 30 MIN: CPT | Performed by: FAMILY MEDICINE

## 2019-03-05 PROCEDURE — 99212 OFFICE O/P EST SF 10 MIN: CPT | Performed by: FAMILY MEDICINE

## 2019-03-05 RX ORDER — METOLAZONE 2.5 MG/1
2.5 TABLET ORAL DAILY
Qty: 30 TABLET | Refills: 1 | Status: SHIPPED | OUTPATIENT
Start: 2019-03-05 | End: 2019-03-05

## 2019-03-05 RX ORDER — DORZOLAMIDE HCL 20 MG/ML
SOLUTION/ DROPS OPHTHALMIC
Qty: 10 ML | Refills: 6 | Status: SHIPPED | OUTPATIENT
Start: 2019-03-05 | End: 2020-01-15

## 2019-03-05 NOTE — PATIENT INSTRUCTIONS
Echocardiogram to be ordered for status of the heart in lieu of diabetes hypertension. Venous imaging done for insufficiency evaluation for lower extremities bilaterally. Continue with Lasix 40 orally for diuresis of bilateral lower extremity.   Young Beckett

## 2019-03-06 NOTE — TELEPHONE ENCOUNTER
Please review; protocol failed.   Hypertensive Medications  Protocol Criteria:  · Appointment scheduled in the past 6 months or in the next 3 months  · BMP or CMP in the past 12 months  · Creatinine result < 2  Recent Outpatient Visits            1 year ago

## 2019-03-06 NOTE — PROGRESS NOTES
HPI:    Patient ID: Tahir Ramos is a 46year old male.     Patient is a 48-year-old -American male known hypertensive diabetic with a history of cervical disc herniation necessitating cervical laminectomy here today to follow-up on continuation of c Review of Systems            Current Outpatient Medications:  Dorzolamide HCl 2 % Ophthalmic Solution INSTILL 1 DROP INTO OU BID Disp: 10 mL Rfl: 6   atorvastatin 40 MG Oral Tab Take 1 tablet (40 mg total) by mouth once daily.  Disp: 90 tablet Rfl: 1   ADRIANNA AMLODIPINE BESYLATE 10 MG Oral Tab TAKE 1/2 TABLET BY MOUTH TWICE DAILY Disp: 180 tablet Rfl: 0   RANITIDINE  MG Oral Tab TAKE 1 TABLET(150 MG) BY MOUTH TWICE DAILY Disp: 180 tablet Rfl: 0   INSULIN SYRINGE 31G X 5/16\" 0.5 ML Does not apply [address] [place] [address] An isolated subcutaneous left anterior neck nodule versus lymph node as depicted just outside of the border of the left lobe of the thyroid. Neck: No thyromegaly present. Cardiovascular: Normal rate and regular rhythm. Exam reveals no gallop.     [place] Ordered to rule out cardiac pathology which is causing bilateral lower extremity edema. - CARD ECHO 2D DOPPLER CONTRAST (CPT=93306); Future    6.  RUQ pain  Ultrasound ordered mainly to rule out status of the gallbladder.  - US ABDOMEN COMPLETE (CPT=76700)

## 2019-03-07 RX ORDER — METOLAZONE 2.5 MG/1
TABLET ORAL
Qty: 90 TABLET | Refills: 1 | Status: SHIPPED | OUTPATIENT
Start: 2019-03-07 | End: 2019-09-07

## 2019-03-07 RX ORDER — CARVEDILOL 12.5 MG/1
TABLET ORAL
Qty: 180 TABLET | Refills: 0 | Status: SHIPPED | OUTPATIENT
Start: 2019-03-07 | End: 2019-06-06

## 2019-03-24 DIAGNOSIS — I10 HYPERTENSION, UNSPECIFIED TYPE: ICD-10-CM

## 2019-03-26 RX ORDER — AMLODIPINE BESYLATE 10 MG/1
TABLET ORAL
Qty: 180 TABLET | Refills: 0 | Status: SHIPPED | OUTPATIENT
Start: 2019-03-26 | End: 2019-04-22

## 2019-03-26 NOTE — TELEPHONE ENCOUNTER
Hypertensive Medications  Protocol Criteria:  · Appointment scheduled in the past 6 months or in the next 3 months  · BMP or CMP in the past 12 months  · Creatinine result < 2  Recent Outpatient Visits            2 weeks ago Glaucoma of both eyes, unspecif advise. Thank you.

## 2019-04-28 RX ORDER — AMITRIPTYLINE HYDROCHLORIDE 25 MG/1
TABLET, FILM COATED ORAL
Qty: 90 TABLET | Refills: 1 | Status: SHIPPED | OUTPATIENT
Start: 2019-04-28 | End: 2019-10-30

## 2019-04-29 RX ORDER — BACLOFEN 10 MG/1
TABLET ORAL
Qty: 270 TABLET | Refills: 0 | Status: SHIPPED | OUTPATIENT
Start: 2019-04-29 | End: 2019-08-01

## 2019-04-29 NOTE — TELEPHONE ENCOUNTER
Review pended refill request as it does not fall under a protocol. Last Rx: 1/24/19  LOV: 1 month ago    Refill passed per [facility] protocol.   Refill Protocol Appointment Criteria  · Appointment scheduled in the past 6 months or in the next 3 mon 1:04 PM]   Chanel:  Have patient contact Financial counseling  [phone] and see if they can send him an application. Provided Pt with FC telephone. Pt will gather supporting documentation and contact [phone].  yunier

## 2019-05-15 ENCOUNTER — HOSPITAL ENCOUNTER (OUTPATIENT)
Dept: ULTRASOUND IMAGING | Facility: HOSPITAL | Age: 54
Discharge: HOME | End: 2019-05-15
Attending: FAMILY MEDICINE

## 2019-05-15 ENCOUNTER — LAB ENCOUNTER (OUTPATIENT)
Dept: LAB | Facility: HOSPITAL | Age: 54
End: 2019-05-15
Attending: FAMILY MEDICINE

## 2019-05-15 ENCOUNTER — HOSPITAL ENCOUNTER (OUTPATIENT)
Dept: CV DIAGNOSTICS | Facility: HOSPITAL | Age: 54
Discharge: HOME | End: 2019-05-15
Attending: FAMILY MEDICINE

## 2019-05-15 DIAGNOSIS — R60.0 BILATERAL LEG EDEMA: ICD-10-CM

## 2019-05-15 DIAGNOSIS — R10.11 RUQ PAIN: ICD-10-CM

## 2019-05-15 DIAGNOSIS — R93.1 ABNORMAL ECHOCARDIOGRAM: ICD-10-CM

## 2019-05-15 DIAGNOSIS — I10 ESSENTIAL HYPERTENSION: ICD-10-CM

## 2019-05-15 DIAGNOSIS — E11.9 TYPE 2 DIABETES MELLITUS WITHOUT COMPLICATION, WITHOUT LONG-TERM CURRENT USE OF INSULIN (HCC): ICD-10-CM

## 2019-05-15 PROCEDURE — 36415 COLL VENOUS BLD VENIPUNCTURE: CPT

## 2019-05-15 PROCEDURE — 81001 URINALYSIS AUTO W/SCOPE: CPT

## 2019-05-15 PROCEDURE — 93306 TTE W/DOPPLER COMPLETE: CPT | Performed by: FAMILY MEDICINE

## 2019-05-15 PROCEDURE — 80061 LIPID PANEL: CPT

## 2019-05-15 PROCEDURE — 80053 COMPREHEN METABOLIC PANEL: CPT

## 2019-05-15 PROCEDURE — 93970 EXTREMITY STUDY: CPT | Performed by: FAMILY MEDICINE

## 2019-05-15 PROCEDURE — 83036 HEMOGLOBIN GLYCOSYLATED A1C: CPT

## 2019-05-15 PROCEDURE — 76700 US EXAM ABDOM COMPLETE: CPT | Performed by: FAMILY MEDICINE

## 2019-05-15 PROCEDURE — 85025 COMPLETE CBC W/AUTO DIFF WBC: CPT

## 2019-05-22 ENCOUNTER — TELEPHONE (OUTPATIENT)
Dept: FAMILY MEDICINE CLINIC | Facility: CLINIC | Age: 54
End: 2019-05-22

## 2019-05-22 NOTE — TELEPHONE ENCOUNTER
Pt sent a request via [employer] requesting to schedule an appt for the following reasons:   Comments:   I am in need of a n eye retinal examination, and I  am also having abnormal pain in my outer throat area and right shoulder and side and need it to be exa

## 2019-05-23 NOTE — TELEPHONE ENCOUNTER
Per pt asking for referral to see eye doctor for annual diabetes check up. Referral pending    Pt c/o of neck pain when he touch and swallow. Pt states he has a hx of a thyroid nodule. Seen in 2017 with endo. Pt states he would like to see Dr. Dion Mcgee.  Ad

## 2019-05-23 NOTE — TELEPHONE ENCOUNTER
Patient was called and informed. Appointment made for 5/28/19. Instructed to call back if symptoms change with understanding.

## 2019-05-23 NOTE — TELEPHONE ENCOUNTER
Hard to decide, honestly, based on a message. If he feels like he needs to be seen sooner, you can try and find a spot but it sounds like it is chronic problems.

## 2019-05-28 ENCOUNTER — APPOINTMENT (OUTPATIENT)
Dept: LAB | Age: 54
End: 2019-05-28
Attending: FAMILY MEDICINE

## 2019-05-28 ENCOUNTER — OFFICE VISIT (OUTPATIENT)
Dept: FAMILY MEDICINE CLINIC | Facility: CLINIC | Age: 54
End: 2019-05-28

## 2019-05-28 VITALS
BODY MASS INDEX: 43 KG/M2 | HEART RATE: 80 BPM | WEIGHT: 292.63 LBS | SYSTOLIC BLOOD PRESSURE: 120 MMHG | RESPIRATION RATE: 18 BRPM | DIASTOLIC BLOOD PRESSURE: 80 MMHG | TEMPERATURE: 97 F

## 2019-05-28 DIAGNOSIS — R97.20 ELEVATED PSA: ICD-10-CM

## 2019-05-28 DIAGNOSIS — N64.4 BREAST PAIN IN MALE: ICD-10-CM

## 2019-05-28 DIAGNOSIS — L98.9 SKIN LESION OF BACK: Primary | ICD-10-CM

## 2019-05-28 PROCEDURE — 99214 OFFICE O/P EST MOD 30 MIN: CPT | Performed by: FAMILY MEDICINE

## 2019-05-28 PROCEDURE — 36415 COLL VENOUS BLD VENIPUNCTURE: CPT

## 2019-05-28 PROCEDURE — 99212 OFFICE O/P EST SF 10 MIN: CPT | Performed by: FAMILY MEDICINE

## 2019-05-28 NOTE — PROGRESS NOTES
HPI:    Patient ID: Segundo Collins is a 46year old male. Patient complains of skin lesion as below. It is occasionally mildly painful, pruritic. He is unsure if that there is any recent change. Patient complains of bilateral medial breast discomfort.   Ivis Dates Suspension Inject 32 Units into the skin 2 (two) times daily before meals. Disp: 3 vial Rfl: 3   NOVOLIN 70/30 (70-30) 100 UNIT/ML Subcutaneous Suspension Inject 32 Units into the skin 2 (two) times daily before meals. The request is for Novolin.  Disp: 3 m He has no cervical adenopathy. Neurological: He is alert and oriented to person, place, and time. Skin: Skin is warm and dry.    See below              ASSESSMENT/PLAN:   Skin lesion of back  (primary encounter diagnosis)  Elevated psa  Breast pain in m

## 2019-05-29 PROBLEM — R97.20 ELEVATED PSA: Status: ACTIVE | Noted: 2019-05-29

## 2019-05-31 ENCOUNTER — OFFICE VISIT (OUTPATIENT)
Dept: OTOLARYNGOLOGY | Facility: CLINIC | Age: 54
End: 2019-05-31

## 2019-05-31 VITALS
TEMPERATURE: 98 F | BODY MASS INDEX: 43.25 KG/M2 | SYSTOLIC BLOOD PRESSURE: 127 MMHG | DIASTOLIC BLOOD PRESSURE: 63 MMHG | HEIGHT: 69 IN | WEIGHT: 292 LBS

## 2019-05-31 DIAGNOSIS — R07.0 THROAT PAIN IN ADULT: Primary | ICD-10-CM

## 2019-05-31 PROCEDURE — 99212 OFFICE O/P EST SF 10 MIN: CPT | Performed by: OTOLARYNGOLOGY

## 2019-05-31 PROCEDURE — 99214 OFFICE O/P EST MOD 30 MIN: CPT | Performed by: OTOLARYNGOLOGY

## 2019-05-31 RX ORDER — METHSCOPOLAMINE BROMIDE 2.5 MG/1
2.5 TABLET ORAL 2 TIMES DAILY
Qty: 60 TABLET | Refills: 3 | Status: SHIPPED | OUTPATIENT
Start: 2019-05-31 | End: 2019-06-15

## 2019-05-31 NOTE — PROGRESS NOTES
Eric Latham is a 46year old male. Patient presents with:  Throat Problem: pt here for a follow up on throat: pt states post nasal drip is getting worse, some throat pain       HISTORY OF PRESENT ILLNESS    History of laryngeal abscess.  Resolved with Cli doing well while on methscopolamine. Previous insurance would no longer carry up and I switched to Medicare and is not sure what will happen if it is were once again. Seems to be the only medication really helps him with his postnasal drip.   Currently on fusion   • HC ARTHROCENTESIS OR INJECT INTERMED JOINT W/O US Bilateral     carpal tunnel injections bilaterally. Corey Alvarenga MD   • [facility] [place]. INJECT SINGLE MULTIPLE TRIGGER POINT 1 OR 2 MUSC      Trigger Point Injections [SITES, # SITES NOT STATED].  provider: Left: Normal.   Skin Normal Inspection - Normal.        Lymph Detail Normal Submental. Submandibular. Anterior cervical. Posterior cervical. Supraclavicular.         Nose/Mouth/Throat Normal External nose - Normal. Lips/teeth/gums - Normal. Tonsils - Normal Rfl: 0  •  Insulin NPH & Regular 70/30 (70-30) 100 UNIT/ML Subcutaneous Suspension, Inject 32 Units into the skin 2 (two) times daily before meals. , Disp: 3 vial, Rfl: 3  •  NOVOLIN 70/30 (70-30) 100 UNIT/ML Subcutaneous Suspension, Inject 32 Units into th methscopolamine and once again to see if it is covered. Return to see me as needed or in 6 months for refills            Austen Castillo MD    5/31/2019    11:21 AM

## 2019-06-06 ENCOUNTER — OFFICE VISIT (OUTPATIENT)
Dept: SURGERY | Facility: CLINIC | Age: 54
End: 2019-06-06

## 2019-06-06 VITALS
SYSTOLIC BLOOD PRESSURE: 125 MMHG | BODY MASS INDEX: 41 KG/M2 | WEIGHT: 280 LBS | DIASTOLIC BLOOD PRESSURE: 79 MMHG | HEART RATE: 84 BPM | TEMPERATURE: 98 F

## 2019-06-06 DIAGNOSIS — N40.1 BENIGN PROSTATIC HYPERPLASIA WITH LOWER URINARY TRACT SYMPTOMS, SYMPTOM DETAILS UNSPECIFIED: ICD-10-CM

## 2019-06-06 DIAGNOSIS — Z80.42 FAMILY HISTORY OF PROSTATE CANCER IN FATHER: ICD-10-CM

## 2019-06-06 DIAGNOSIS — R97.20 ELEVATED PSA: Primary | ICD-10-CM

## 2019-06-06 DIAGNOSIS — I10 HYPERTENSION, UNSPECIFIED TYPE: ICD-10-CM

## 2019-06-06 PROCEDURE — 99244 OFF/OP CNSLTJ NEW/EST MOD 40: CPT | Performed by: NURSE PRACTITIONER

## 2019-06-06 PROCEDURE — 99212 OFFICE O/P EST SF 10 MIN: CPT | Performed by: NURSE PRACTITIONER

## 2019-06-06 RX ORDER — CEFDINIR 300 MG/1
300 CAPSULE ORAL EVERY 12 HOURS
Qty: 6 CAPSULE | Refills: 0 | Status: SHIPPED | OUTPATIENT
Start: 2019-06-06 | End: 2019-06-09

## 2019-06-06 RX ORDER — TAMSULOSIN HYDROCHLORIDE 0.4 MG/1
0.4 CAPSULE ORAL DAILY
Qty: 90 CAPSULE | Refills: 3 | Status: SHIPPED | OUTPATIENT
Start: 2019-06-06 | End: 2019-07-06

## 2019-06-06 RX ORDER — CIPROFLOXACIN 500 MG/1
500 TABLET, FILM COATED ORAL 2 TIMES DAILY
Qty: 6 TABLET | Refills: 0 | Status: SHIPPED | OUTPATIENT
Start: 2019-06-06 | End: 2020-07-07 | Stop reason: ALTCHOICE

## 2019-06-06 RX ORDER — CIPROFLOXACIN 500 MG/1
500 TABLET, FILM COATED ORAL 2 TIMES DAILY
Qty: 20 TABLET | Refills: 0 | Status: SHIPPED | OUTPATIENT
Start: 2019-06-06 | End: 2019-06-06

## 2019-06-06 RX ORDER — CARVEDILOL 12.5 MG/1
TABLET ORAL
Qty: 180 TABLET | Refills: 0 | Status: SHIPPED | OUTPATIENT
Start: 2019-06-06 | End: 2019-09-07

## 2019-06-06 NOTE — TELEPHONE ENCOUNTER
Refill passed per [facility] protocol.   Hypertensive Medications  Protocol Criteria:  · Appointment scheduled in the past 6 months or in the next 3 months  · BMP or CMP in the past 12 months  · Creatinine result < 2  Recent Outpatient Visits

## 2019-06-06 NOTE — PROGRESS NOTES
HPI:    Patient ID: Valeria Wesley is a 46year old male. HPI     Patient is a 46year old male who presents to the clinic for a consult at the request of, and a copy of this note will be sent to, Dr. Supriya Betancourt, regarding elevated PSA.       Elevated PSA times daily.  Disp: 60 tablet Rfl: 3   BACLOFEN 10 MG Oral Tab TAKE 1 TABLET(10 MG) BY MOUTH THREE TIMES DAILY Disp: 270 tablet Rfl: 0   AMITRIPTYLINE HCL 25 MG Oral Tab TAKE 1 TABLET BY MOUTH EVERY NIGHT AT BEDTIME Disp: 90 tablet Rfl: 1   atorvastatin 40 100 UNIT/ML Subcutaneous Suspension Inject 35 units in am and 32 units in pm. Take add'l 4-6 units extra when having a high carb meal. Disp: 45 mL Rfl: 0   TRUE METRIX BLOOD GLUCOSE TEST In Vitro Strip USE TO CHECK 2 TO 3 TIMES A DAY Disp: 300 strip Rfl: 0 Davin Bates   • REPAIR ROTATOR CUFF,ACUTE Right 6/13      Family History   Problem Relation Age of Onset   • Prostate Cancer Father    • Hypertension Father    • Diabetes Father    • Cancer Father         bladder   • Diabetes Mother    • Stroke Moth of continuing to monitor PSA vs proceeding with TRUS prostate biopsy. Patient would like to proceed with biopsy at this time. Possible risks of procedure including but not limited to bleeding, infection, and urinary retention were discussed.   Patient eda

## 2019-06-15 ENCOUNTER — OFFICE VISIT (OUTPATIENT)
Dept: FAMILY MEDICINE CLINIC | Facility: CLINIC | Age: 54
End: 2019-06-15

## 2019-06-15 VITALS
TEMPERATURE: 98 F | BODY MASS INDEX: 42 KG/M2 | HEART RATE: 85 BPM | RESPIRATION RATE: 18 BRPM | WEIGHT: 286.81 LBS | SYSTOLIC BLOOD PRESSURE: 120 MMHG | DIASTOLIC BLOOD PRESSURE: 79 MMHG

## 2019-06-15 DIAGNOSIS — R09.82 POST-NASAL DRIP: ICD-10-CM

## 2019-06-15 DIAGNOSIS — I10 HYPERTENSION, UNSPECIFIED TYPE: ICD-10-CM

## 2019-06-15 DIAGNOSIS — Z86.39 HX OF THYROID NODULE: Primary | ICD-10-CM

## 2019-06-15 DIAGNOSIS — M99.02 THORACIC REGION SOMATIC DYSFUNCTION: ICD-10-CM

## 2019-06-15 DIAGNOSIS — E11.9 TYPE 2 DIABETES MELLITUS WITHOUT COMPLICATION, WITHOUT LONG-TERM CURRENT USE OF INSULIN (HCC): ICD-10-CM

## 2019-06-15 DIAGNOSIS — R97.20 ELEVATED PSA: ICD-10-CM

## 2019-06-15 PROCEDURE — 98925 OSTEOPATH MANJ 1-2 REGIONS: CPT | Performed by: FAMILY MEDICINE

## 2019-06-15 PROCEDURE — 99214 OFFICE O/P EST MOD 30 MIN: CPT | Performed by: FAMILY MEDICINE

## 2019-06-15 PROCEDURE — 99212 OFFICE O/P EST SF 10 MIN: CPT | Performed by: FAMILY MEDICINE

## 2019-06-15 RX ORDER — POTASSIUM CHLORIDE 750 MG/1
20 TABLET, FILM COATED, EXTENDED RELEASE ORAL 2 TIMES DAILY
Qty: 120 TABLET | Refills: 2 | Status: SHIPPED | OUTPATIENT
Start: 2019-06-15 | End: 2020-07-15 | Stop reason: ALTCHOICE

## 2019-06-15 RX ORDER — LEVOCETIRIZINE DIHYDROCHLORIDE 5 MG/1
5 TABLET, FILM COATED ORAL EVERY EVENING
Qty: 30 TABLET | Refills: 1 | Status: SHIPPED | OUTPATIENT
Start: 2019-06-15 | End: 2019-06-15

## 2019-06-15 NOTE — PATIENT INSTRUCTIONS
OMT done. Medication reviewed and renewed where needed and appropriate. Comply with medications. Monitor blood pressures and record at home. Limit salt intake. Recommend weight loss via daily exercising and consistent healthy dietary changes.   Encourag

## 2019-06-15 NOTE — PROGRESS NOTES
HPI:    Patient ID: Monie Frances is a 46year old male. Patient is a 77-year-old -American male well-known to the clinic treated for hypertension and diabetes is here for follow-up on known multi-nodule thyroid which needs an updated ultrasound. Potassium Chloride ER 8 MEQ Oral Tab CR Take 2 tablets (16 mEq total) by mouth 2 (two) times daily.  Disp: 360 tablet Rfl: 2   METOLAZONE 2.5 MG Oral Tab TAKE 1 TABLET(2.5 MG) BY MOUTH DAILY Disp: 90 tablet Rfl: 1   Dorzolamide HCl 2 % Ophthalmic Solution I INSULIN SYRINGE 31G X 5/16\" 0.5 ML Does not apply Misc USE AS DIRECTED TWICE DAILY Disp: 100 each Rfl: 5   TRUE METRIX BLOOD GLUCOSE TEST In Vitro Strip USE TO CHECK 2 TO 3 TIMES A DAY Disp: 300 strip Rfl: 0   Glucose Blood (TRUE METRIX BLOOD GLUCOSE TEST Patient is under the care of urology regarding his elevated PSA; patient encouraged to keep all his appointments.     4. Post-nasal drip  Likely the source of anterior neck and posterior pharyngeal irritation.  - Levocetirizine Dihydrochloride (XYZAL) 5 MG

## 2019-06-16 RX ORDER — BENAZEPRIL HYDROCHLORIDE AND HYDROCHLOROTHIAZIDE 20; 12.5 MG/1; MG/1
TABLET ORAL
Qty: 90 TABLET | Refills: 2 | Status: SHIPPED | OUTPATIENT
Start: 2019-06-16 | End: 2020-09-10

## 2019-06-16 RX ORDER — LEVOCETIRIZINE DIHYDROCHLORIDE 5 MG/1
TABLET, FILM COATED ORAL
Qty: 90 TABLET | Refills: 1 | Status: SHIPPED | OUTPATIENT
Start: 2019-06-16

## 2019-07-01 NOTE — TELEPHONE ENCOUNTER
Pharmacy requesting a refill on insulin syringes, order pending.     INSULIN SYRINGE 31G X 5/16\" 0.5 ML Does not apply Misc 100 each 5 5/4/2018    Sig:   USE AS DIRECTED TWICE DAILY     Route:   (none)     Order #:   [id number]

## 2019-07-02 RX ORDER — NAPROXEN SODIUM 220 MG
TABLET ORAL
Qty: 100 EACH | Refills: 5 | Status: SHIPPED | OUTPATIENT
Start: 2019-07-02 | End: 2020-10-15

## 2019-07-02 NOTE — TELEPHONE ENCOUNTER
Rn spoke with patient - he states he is not able to book apt now because he is walking. Rn advised him we would send over insulin syringe order today but need him to book f/u apt to have future scripts refilled.  Pt verbalized understanding and states he wi

## 2019-08-01 DIAGNOSIS — Z91.09 ENVIRONMENTAL ALLERGIES: ICD-10-CM

## 2019-08-02 RX ORDER — BACLOFEN 10 MG/1
TABLET ORAL
Qty: 270 TABLET | Refills: 0 | Status: SHIPPED | OUTPATIENT
Start: 2019-08-02 | End: 2019-10-30

## 2019-08-02 RX ORDER — MONTELUKAST SODIUM 10 MG/1
TABLET ORAL
Qty: 90 TABLET | Refills: 3 | Status: SHIPPED | OUTPATIENT
Start: 2019-08-02 | End: 2020-09-10

## 2019-08-02 NOTE — TELEPHONE ENCOUNTER
Review pended refill request as it does not fall under a protocol.     Last Rx: 4/29/19  LOV: 6/15/19

## 2019-08-09 DIAGNOSIS — R09.82 POST-NASAL DRIP: ICD-10-CM

## 2019-08-09 DIAGNOSIS — H40.9 GLAUCOMA, UNSPECIFIED GLAUCOMA TYPE, UNSPECIFIED LATERALITY: ICD-10-CM

## 2019-08-09 RX ORDER — LEVOCETIRIZINE DIHYDROCHLORIDE 5 MG/1
TABLET, FILM COATED ORAL
Qty: 30 TABLET | Refills: 0 | OUTPATIENT
Start: 2019-08-09

## 2019-08-09 RX ORDER — BENAZEPRIL HYDROCHLORIDE AND HYDROCHLOROTHIAZIDE 20; 12.5 MG/1; MG/1
TABLET ORAL
Qty: 90 TABLET | Refills: 0 | OUTPATIENT
Start: 2019-08-09

## 2019-08-09 NOTE — TELEPHONE ENCOUNTER
Review pended refill request as it does not fall under a protocol.     Last Rx: 11/25/18  LOV: 6/15/19

## 2019-08-10 RX ORDER — LATANOPROST 50 UG/ML
SOLUTION/ DROPS OPHTHALMIC
Qty: 10 ML | Refills: 2 | Status: SHIPPED | OUTPATIENT
Start: 2019-08-10 | End: 2021-03-15

## 2019-08-17 DIAGNOSIS — I10 ESSENTIAL HYPERTENSION: ICD-10-CM

## 2019-08-18 NOTE — TELEPHONE ENCOUNTER
Refill passed per [place] [employer] [place], [facility] protocol.  However MD review required as per med module, pt also on metolazone and benazepril-HCTZ    Requested Prescriptions   Pending Prescriptions Disp Refills   • FUROSEMIDE 40 MG Oral Tab [Pharmacy Med Name: Makayla Fior

## 2019-08-19 RX ORDER — FUROSEMIDE 40 MG/1
TABLET ORAL
Qty: 180 TABLET | Refills: 1 | Status: SHIPPED | OUTPATIENT
Start: 2019-08-19 | End: 2020-01-15

## 2019-08-21 ENCOUNTER — HOSPITAL ENCOUNTER (OUTPATIENT)
Dept: MAMMOGRAPHY | Facility: HOSPITAL | Age: 54
Discharge: HOME | End: 2019-08-21
Attending: FAMILY MEDICINE

## 2019-08-21 DIAGNOSIS — N64.4 BREAST PAIN IN MALE: ICD-10-CM

## 2019-08-21 PROCEDURE — 77066 DX MAMMO INCL CAD BI: CPT | Performed by: FAMILY MEDICINE

## 2019-08-21 PROCEDURE — 77062 BREAST TOMOSYNTHESIS BI: CPT | Performed by: FAMILY MEDICINE

## 2019-08-22 RX ORDER — HUMAN INSULIN 100 [USP'U]/ML
INJECTION, SUSPENSION SUBCUTANEOUS
Qty: 30 ML | Refills: 0 | Status: SHIPPED | OUTPATIENT
Start: 2019-08-22 | End: 2019-10-08

## 2019-08-22 RX ORDER — BENAZEPRIL HYDROCHLORIDE AND HYDROCHLOROTHIAZIDE 20; 12.5 MG/1; MG/1
TABLET ORAL
Qty: 90 TABLET | Refills: 1 | Status: SHIPPED | OUTPATIENT
Start: 2019-08-22 | End: 2020-02-13

## 2019-08-22 NOTE — TELEPHONE ENCOUNTER
Please review; protocol failed.     Requested Prescriptions     Pending Prescriptions Disp Refills   • NOVOLIN 70/30 (70-30) 100 UNIT/ML Subcutaneous Suspension [Pharmacy Med Name: NOVOLIN INSULIN 70/30 HUMAN] 30 mL 0     Sig: INJECT 32 UNITS UNDER THE SKIN

## 2019-08-27 DIAGNOSIS — R09.82 POST-NASAL DRIP: ICD-10-CM

## 2019-08-28 ENCOUNTER — HOSPITAL ENCOUNTER (OUTPATIENT)
Dept: ULTRASOUND IMAGING | Age: 54
Discharge: HOME | End: 2019-08-28
Attending: FAMILY MEDICINE

## 2019-08-28 DIAGNOSIS — Z86.39 HX OF THYROID NODULE: ICD-10-CM

## 2019-08-28 PROCEDURE — 76536 US EXAM OF HEAD AND NECK: CPT | Performed by: FAMILY MEDICINE

## 2019-08-29 RX ORDER — LEVOCETIRIZINE DIHYDROCHLORIDE 5 MG/1
TABLET, FILM COATED ORAL
Qty: 30 TABLET | Refills: 0 | OUTPATIENT
Start: 2019-08-29

## 2019-09-03 ENCOUNTER — OFFICE VISIT (OUTPATIENT)
Dept: SURGERY | Facility: CLINIC | Age: 54
End: 2019-09-03

## 2019-09-03 VITALS
HEART RATE: 83 BPM | DIASTOLIC BLOOD PRESSURE: 70 MMHG | WEIGHT: 280 LBS | HEIGHT: 69 IN | BODY MASS INDEX: 41.47 KG/M2 | RESPIRATION RATE: 16 BRPM | TEMPERATURE: 98 F | SYSTOLIC BLOOD PRESSURE: 110 MMHG

## 2019-09-03 DIAGNOSIS — N40.1 BENIGN PROSTATIC HYPERPLASIA WITH LOWER URINARY TRACT SYMPTOMS, SYMPTOM DETAILS UNSPECIFIED: ICD-10-CM

## 2019-09-03 DIAGNOSIS — R97.20 ELEVATED PSA: Primary | ICD-10-CM

## 2019-09-03 PROCEDURE — 76942 ECHO GUIDE FOR BIOPSY: CPT | Performed by: UROLOGY

## 2019-09-03 PROCEDURE — 55700 BIOPSY OF PROSTATE,NEEDLE/PUNCH: CPT | Performed by: UROLOGY

## 2019-09-03 PROCEDURE — 99213 OFFICE O/P EST LOW 20 MIN: CPT | Performed by: UROLOGY

## 2019-09-03 RX ORDER — CEFDINIR 300 MG/1
300 CAPSULE ORAL EVERY 12 HOURS
Qty: 3 CAPSULE | Refills: 0 | Status: SHIPPED | OUTPATIENT
Start: 2019-09-03 | End: 2019-09-06

## 2019-09-03 NOTE — PROGRESS NOTES
Jackson Bamberger is a 46year old male. HPI:   Patient presents with:  elevated psa: transrectal ultrasound with prostate biopsies      25-year-old male presents for transrectal ultrasound and prostate biopsy.   He was most recently seen by nurse practitione Social History    Tobacco Use      Smoking status: Never Smoker      Smokeless tobacco: Never Used    Alcohol use:  Yes      Alcohol/week: 0.0 standard drinks      Comment: Very rare    Drug use: No       Medications (Active prior to today's visit):    Curr total) by mouth once daily. Disp: 90 tablet Rfl: 1   Potassium Chloride ER 8 MEQ Oral Tab CR Take 2 tablets (16 mEq total) by mouth 2 (two) times daily.  Disp: 360 tablet Rfl: 2   METOLAZONE 2.5 MG Oral Tab TAKE 1 TABLET(2.5 MG) BY MOUTH DAILY Disp: 90 tabl Transrectal ultrasound and prostate biopsy.    Anesthesia: 2% viscous lidocaine gel, 1% lidocaine 7 Ml   Prostate Volume: 45 g  Prostate US abnormalities: None  Seminal Vesicles: Grossly normal  Bladder: Not well seen  Biopsies obtained: 12  Areas biopsied:

## 2019-09-07 DIAGNOSIS — I10 HYPERTENSION, UNSPECIFIED TYPE: ICD-10-CM

## 2019-09-08 RX ORDER — METOLAZONE 2.5 MG/1
TABLET ORAL
Qty: 90 TABLET | Refills: 1 | Status: SHIPPED | OUTPATIENT
Start: 2019-09-08 | End: 2020-01-15

## 2019-09-08 RX ORDER — CARVEDILOL 12.5 MG/1
TABLET ORAL
Qty: 180 TABLET | Refills: 1 | Status: SHIPPED | OUTPATIENT
Start: 2019-09-08 | End: 2020-01-15

## 2019-09-16 ENCOUNTER — OFFICE VISIT (OUTPATIENT)
Dept: SURGERY | Facility: CLINIC | Age: 54
End: 2019-09-16

## 2019-09-16 VITALS
DIASTOLIC BLOOD PRESSURE: 70 MMHG | BODY MASS INDEX: 41 KG/M2 | TEMPERATURE: 98 F | SYSTOLIC BLOOD PRESSURE: 118 MMHG | WEIGHT: 277 LBS

## 2019-09-16 DIAGNOSIS — R97.20 ELEVATED PSA: Primary | ICD-10-CM

## 2019-09-16 DIAGNOSIS — N40.1 BENIGN PROSTATIC HYPERPLASIA WITH LOWER URINARY TRACT SYMPTOMS, SYMPTOM DETAILS UNSPECIFIED: ICD-10-CM

## 2019-09-16 PROCEDURE — 99215 OFFICE O/P EST HI 40 MIN: CPT | Performed by: UROLOGY

## 2019-09-16 NOTE — PROGRESS NOTES
Mariajose King is a 46year old male. HPI:   Patient presents with:  elevated psa: PT presents for follow up to discuss bx results. PT states he feels good.        59-year-old male with recently diagnosed prostate cancer, clinical T2 a [place] 4+3 high-vol visit):    Current Outpatient Medications:  METOLAZONE 2.5 MG Oral Tab TAKE 1 TABLET(2.5 MG) BY MOUTH DAILY Disp: 90 tablet Rfl: 1   CARVEDILOL 12.5 MG Oral Tab TAKE 1 TABLET(12.5 MG) BY MOUTH TWICE DAILY WITH MEALS Disp: 180 tablet Rfl: 1   NOVOLIN 70/30 tablet Rfl: 0   Insulin NPH & Regular 70/30 (70-30) 100 UNIT/ML Subcutaneous Suspension Inject 32 Units into the skin 2 (two) times daily before meals.  Disp: 3 vial Rfl: 3   RANITIDINE  MG Oral Tab TAKE 1 TABLET(150 MG) BY MOUTH TWICE DAILY Disp: 18 We discussed the benefits and drawbacks of each as follows: With active surveillance the benefit would be avoidance of any immediate therapy and possible side effects thereof.   The patient was told this would involve periodic PSA testing and digital rectal Orthodoxy and is not active sexually now as he has been . He states he did try oral phosphodiesterase inhibitors in the past with moderate results. He denies any urination symptoms at the present time. He denies any incontinence.     He states he

## 2019-10-10 RX ORDER — HUMAN INSULIN 100 [USP'U]/ML
INJECTION, SUSPENSION SUBCUTANEOUS
Qty: 30 ML | Refills: 0 | Status: SHIPPED | OUTPATIENT
Start: 2019-10-10 | End: 2020-01-24

## 2019-10-10 NOTE — TELEPHONE ENCOUNTER
Diabetes Medications  Protocol Criteria:  · Appointment scheduled in the past 6 months or the next 3 months  · A1C < 7.5 in the past 6 months  · Creatinine in the past 12 months  · Creatinine result < 1.5   Recent Outpatient Visits            3 weeks ago E

## 2019-10-10 NOTE — TELEPHONE ENCOUNTER
Please review; protocol failed.   Abnormal lab    Recent Visits  Date Type Provider Dept   06/15/19 Office Visit Ronn Alves DO [facility]   05/28/19 Office Visit Sil Edmonds MD [facility]   03/05/19 Office Visit Ronn Alves

## 2019-11-01 RX ORDER — AMITRIPTYLINE HYDROCHLORIDE 25 MG/1
TABLET, FILM COATED ORAL
Qty: 90 TABLET | Refills: 1 | Status: SHIPPED | OUTPATIENT
Start: 2019-11-01 | End: 2020-10-11

## 2019-11-01 RX ORDER — BACLOFEN 10 MG/1
TABLET ORAL
Qty: 270 TABLET | Refills: 0 | Status: SHIPPED | OUTPATIENT
Start: 2019-11-01 | End: 2020-01-15

## 2019-11-02 NOTE — TELEPHONE ENCOUNTER
Refill passed per [facility], [facility] protocol.   Refill Protocol Appointment Criteria  · Appointment scheduled in the past 6 months or in the next 3 months  Recent Outpatient Visits            1 month ago Elevated PSA    [facility] for [facility]

## 2019-11-12 ENCOUNTER — TELEPHONE (OUTPATIENT)
Dept: SURGERY | Facility: CLINIC | Age: 54
End: 2019-11-12

## 2019-11-12 NOTE — TELEPHONE ENCOUNTER
Dr. Troy Dominguez, pt would like to discuss plan of care. Was supposed to follow up last month but did not schedule appt. You have a few 10 min appts available the week of 11/25/19 labeled \"test\".  Would you like to see the patient the week of 11/25 or would you

## 2019-11-25 ENCOUNTER — OFFICE VISIT (OUTPATIENT)
Dept: SURGERY | Facility: CLINIC | Age: 54
End: 2019-11-25

## 2019-11-25 VITALS
HEART RATE: 74 BPM | SYSTOLIC BLOOD PRESSURE: 133 MMHG | DIASTOLIC BLOOD PRESSURE: 84 MMHG | BODY MASS INDEX: 41 KG/M2 | WEIGHT: 277 LBS

## 2019-11-25 DIAGNOSIS — C61 PROSTATE CANCER (HCC): Primary | ICD-10-CM

## 2019-11-25 PROCEDURE — 99213 OFFICE O/P EST LOW 20 MIN: CPT | Performed by: UROLOGY

## 2019-11-25 NOTE — PROGRESS NOTES
Eligio Larose is a 53year old male. HPI:   Patient presents with:  Prostate Cancer: patient here to discuss treatment options       58-year-old male presents in follow-up to previous visit September 16, 2019.   He was diagnosed September 3, 2019 on trans Smoking status: Never Smoker      Smokeless tobacco: Never Used    Alcohol use:  Yes      Alcohol/week: 0.0 standard drinks      Comment: Very rare    Drug use: No       Medications (Active prior to today's visit):  Current Outpatient Medications   Medicati INSTILL 1 DROP INTO OU BID 10 mL 6   • Timolol Maleate 0.5 % Ophthalmic Solution Place 1 drop into both eyes 2 (two) times daily.  10 mL 2   • ACYCLOVIR 400 MG Oral Tab TAKE 1 TABLET(400 MG) BY MOUTH TWICE DAILY 10 tablet 0   • Insulin NPH & Regular 70/30 ( No prescriptions requested or ordered in this encounter       Imaging & Referrals:  None     11/25/2019  Camacho Ward MD

## 2020-01-14 DIAGNOSIS — I10 ESSENTIAL HYPERTENSION: ICD-10-CM

## 2020-01-14 DIAGNOSIS — I10 HYPERTENSION, UNSPECIFIED TYPE: ICD-10-CM

## 2020-01-14 DIAGNOSIS — H40.9 GLAUCOMA OF BOTH EYES, UNSPECIFIED GLAUCOMA TYPE: ICD-10-CM

## 2020-01-15 RX ORDER — AMLODIPINE BESYLATE 10 MG/1
TABLET ORAL
Qty: 180 TABLET | Refills: 0 | Status: SHIPPED | OUTPATIENT
Start: 2020-01-15 | End: 2020-09-10

## 2020-01-15 RX ORDER — DORZOLAMIDE HCL 20 MG/ML
SOLUTION/ DROPS OPHTHALMIC
Qty: 10 ML | Refills: 6 | Status: SHIPPED | OUTPATIENT
Start: 2020-01-15 | End: 2021-04-27

## 2020-01-15 RX ORDER — BACLOFEN 10 MG/1
TABLET ORAL
Qty: 270 TABLET | Refills: 0 | Status: SHIPPED | OUTPATIENT
Start: 2020-01-15 | End: 2021-06-07

## 2020-01-15 RX ORDER — FUROSEMIDE 40 MG/1
TABLET ORAL
Qty: 180 TABLET | Refills: 0 | Status: SHIPPED | OUTPATIENT
Start: 2020-01-15 | End: 2020-04-13

## 2020-01-15 RX ORDER — CARVEDILOL 12.5 MG/1
TABLET ORAL
Qty: 180 TABLET | Refills: 0 | Status: SHIPPED | OUTPATIENT
Start: 2020-01-15 | End: 2020-04-13

## 2020-01-15 RX ORDER — METOLAZONE 2.5 MG/1
TABLET ORAL
Qty: 90 TABLET | Refills: 0 | Status: SHIPPED | OUTPATIENT
Start: 2020-01-15 | End: 2020-07-15 | Stop reason: ALTCHOICE

## 2020-01-15 NOTE — TELEPHONE ENCOUNTER
3 month supply refilled. Pt needs to schedule follow-up appointment. Notified on [address]   Hypertensive Medications  Protocol Criteria:  · Appointment scheduled in the past 6 months or in the next 3 months  · BMP or CMP in the past 12 months  · Creatinine

## 2020-01-15 NOTE — TELEPHONE ENCOUNTER
Review pended refill request as it does not fall under a protocol.    LOV 6/15/19    BACLOFEN   LR 11/1/19    DORZOLAMIDE  LR 3/5/19    To Dr. Nadege Goldstein for Dr. Duane Mackintosh ED MD

## 2020-01-24 RX ORDER — HUMAN INSULIN 100 [USP'U]/ML
INJECTION, SUSPENSION SUBCUTANEOUS
Qty: 30 ML | Refills: 0 | Status: SHIPPED | OUTPATIENT
Start: 2020-01-24 | End: 2020-07-01

## 2020-01-24 NOTE — TELEPHONE ENCOUNTER
Requested Prescriptions     Pending Prescriptions Disp Refills   • NOVOLIN 70/30 (70-30) 100 UNIT/ML Subcutaneous Suspension [Pharmacy Med Name: NOVOLIN INSULIN 70/30 HUMAN] 30 mL 0     Sig: INJECT 32 UNITS UNDER THE SKIN TWICE DAILY BEFORE MEALS         R

## 2020-02-13 RX ORDER — BENAZEPRIL HYDROCHLORIDE AND HYDROCHLOROTHIAZIDE 20; 12.5 MG/1; MG/1
TABLET ORAL
Qty: 90 TABLET | Refills: 0 | Status: SHIPPED | OUTPATIENT
Start: 2020-02-13 | End: 2020-05-18

## 2020-04-03 DIAGNOSIS — J21.9 ACUTE BRONCHIOLITIS DUE TO UNSPECIFIED ORGANISM: Primary | ICD-10-CM

## 2020-04-13 DIAGNOSIS — I10 ESSENTIAL HYPERTENSION: ICD-10-CM

## 2020-04-13 DIAGNOSIS — E78.5 HYPERLIPIDEMIA, UNSPECIFIED HYPERLIPIDEMIA TYPE: ICD-10-CM

## 2020-04-13 DIAGNOSIS — I10 HYPERTENSION, UNSPECIFIED TYPE: ICD-10-CM

## 2020-04-13 RX ORDER — CARVEDILOL 12.5 MG/1
TABLET ORAL
Qty: 180 TABLET | Refills: 0 | Status: SHIPPED | OUTPATIENT
Start: 2020-04-13 | End: 2020-09-15

## 2020-04-13 RX ORDER — POTASSIUM CHLORIDE 600 MG/1
TABLET, FILM COATED, EXTENDED RELEASE ORAL
Qty: 360 TABLET | Refills: 2 | Status: SHIPPED | OUTPATIENT
Start: 2020-04-13 | End: 2020-09-10

## 2020-04-13 RX ORDER — ATORVASTATIN CALCIUM 40 MG/1
TABLET, FILM COATED ORAL
Qty: 90 TABLET | Refills: 1 | Status: SHIPPED | OUTPATIENT
Start: 2020-04-13 | End: 2020-10-11

## 2020-04-13 RX ORDER — FUROSEMIDE 40 MG/1
TABLET ORAL
Qty: 180 TABLET | Refills: 0 | Status: SHIPPED | OUTPATIENT
Start: 2020-04-13 | End: 2020-10-11

## 2020-05-18 RX ORDER — BENAZEPRIL HYDROCHLORIDE AND HYDROCHLOROTHIAZIDE 20; 12.5 MG/1; MG/1
TABLET ORAL
Qty: 90 TABLET | Refills: 0 | Status: SHIPPED | OUTPATIENT
Start: 2020-05-18 | End: 2020-07-15

## 2020-07-01 RX ORDER — HUMAN INSULIN 100 [USP'U]/ML
INJECTION, SUSPENSION SUBCUTANEOUS
Qty: 30 ML | Refills: 0 | Status: SHIPPED | OUTPATIENT
Start: 2020-07-01 | End: 2020-08-14

## 2020-07-07 ENCOUNTER — OFFICE VISIT (OUTPATIENT)
Dept: SURGERY | Facility: CLINIC | Age: 55
End: 2020-07-07
Payer: COMMERCIAL

## 2020-07-07 VITALS
WEIGHT: 255 LBS | BODY MASS INDEX: 37.77 KG/M2 | TEMPERATURE: 98 F | SYSTOLIC BLOOD PRESSURE: 134 MMHG | DIASTOLIC BLOOD PRESSURE: 83 MMHG | HEIGHT: 69 IN | RESPIRATION RATE: 16 BRPM | HEART RATE: 80 BPM

## 2020-07-07 DIAGNOSIS — C61 PROSTATE CANCER (HCC): Primary | ICD-10-CM

## 2020-07-07 PROCEDURE — 99213 OFFICE O/P EST LOW 20 MIN: CPT | Performed by: UROLOGY

## 2020-07-07 PROCEDURE — G0463 HOSPITAL OUTPT CLINIC VISIT: HCPCS | Performed by: UROLOGY

## 2020-07-07 RX ORDER — TAMSULOSIN HYDROCHLORIDE 0.4 MG/1
CAPSULE ORAL
COMMUNITY
Start: 2020-04-17 | End: 2020-08-11

## 2020-07-07 NOTE — PROGRESS NOTES
Christofer Vital is a 53year old male.     HPI:   Patient presents with:  Prostate Cancer      29-year-old -American male diagnosed with prostate cancer September 3, 2019 on transrectal ultrasound and biopsy clinical T2 a [place] 4+3 high-volume disease Diabetes Father    • Cancer Father         bladder   • Diabetes Mother    • Stroke Mother         CVA(stroke)   • Cancer Maternal Aunt 80        stomach (cause of death)      Social History: Social History    Tobacco Use      Smoking status: Never Smoker BENAZEPRIL-HYDROCHLOROTHIAZIDE 20-12.5 MG Oral Tab TAKE 1 TABLET BY MOUTH EVERY DAY 90 tablet 2   • Potassium Chloride ER (KLOR-CON 10) 10 MEQ Oral Tab CR Take 2 tablets (20 mEq total) by mouth 2 (two) times daily.  120 tablet 2   • Timolol Maleate 0.5 % Op right away to make an appointment. I asked the patient to call me if he has difficulty getting an appointment with them. Otherwise, he can follow-up with us on a as needed basis.          Orders This Visit:  No orders of the defined types were placed in t

## 2020-07-29 ENCOUNTER — TELEPHONE (OUTPATIENT)
Dept: FAMILY MEDICINE CLINIC | Facility: CLINIC | Age: 55
End: 2020-07-29

## 2020-07-30 NOTE — TELEPHONE ENCOUNTER
Felix Huang from [facility] stated they received the patient's surgical clearance, but the EKG needs to be signed or initial by Dr. Colten Rivas. Please refax EKG with signature or initials to #[phone]    Thank you.

## 2020-08-07 ENCOUNTER — APPOINTMENT (OUTPATIENT)
Dept: LAB | Facility: HOSPITAL | Age: 55
End: 2020-08-07
Attending: RADIOLOGY
Payer: MEDICARE

## 2020-08-07 DIAGNOSIS — E11.8 TYPE II DIABETES MELLITUS WITH MANIFESTATIONS (HCC): ICD-10-CM

## 2020-08-07 LAB
ANION GAP SERPL CALC-SCNC: 5 MMOL/L (ref 0–18)
BUN BLD-MCNC: 24 MG/DL (ref 7–18)
BUN/CREAT SERPL: 19.5 (ref 10–20)
CALCIUM BLD-MCNC: 8.8 MG/DL (ref 8.5–10.1)
CHLORIDE SERPL-SCNC: 102 MMOL/L (ref 98–112)
CO2 SERPL-SCNC: 29 MMOL/L (ref 21–32)
CREAT BLD-MCNC: 1.23 MG/DL (ref 0.7–1.3)
GLUCOSE BLD-MCNC: 306 MG/DL (ref 70–99)
OSMOLALITY SERPL CALC.SUM OF ELEC: 298 MOSM/KG (ref 275–295)
PATIENT FASTING Y/N/NP: NO
POTASSIUM SERPL-SCNC: 3.6 MMOL/L (ref 3.5–5.1)
SODIUM SERPL-SCNC: 136 MMOL/L (ref 136–145)

## 2020-08-07 PROCEDURE — 80048 BASIC METABOLIC PNL TOTAL CA: CPT

## 2020-08-07 PROCEDURE — 36415 COLL VENOUS BLD VENIPUNCTURE: CPT

## 2020-08-14 RX ORDER — HUMAN INSULIN 100 [USP'U]/ML
INJECTION, SUSPENSION SUBCUTANEOUS
Qty: 30 ML | Refills: 0 | Status: SHIPPED | OUTPATIENT
Start: 2020-08-14 | End: 2020-10-11

## 2020-08-17 ENCOUNTER — OFFICE VISIT (OUTPATIENT)
Dept: FAMILY MEDICINE CLINIC | Facility: CLINIC | Age: 55
End: 2020-08-17
Payer: COMMERCIAL

## 2020-08-17 VITALS
TEMPERATURE: 98 F | RESPIRATION RATE: 18 BRPM | DIASTOLIC BLOOD PRESSURE: 80 MMHG | SYSTOLIC BLOOD PRESSURE: 122 MMHG | HEART RATE: 77 BPM | WEIGHT: 290 LBS | BODY MASS INDEX: 42.95 KG/M2 | HEIGHT: 69 IN

## 2020-08-17 DIAGNOSIS — K58.8 OTHER IRRITABLE BOWEL SYNDROME: ICD-10-CM

## 2020-08-17 DIAGNOSIS — I10 HYPERTENSION, ESSENTIAL: ICD-10-CM

## 2020-08-17 DIAGNOSIS — E66.01 MORBID OBESITY WITH BMI OF 40.0-44.9, ADULT (HCC): ICD-10-CM

## 2020-08-17 DIAGNOSIS — C61 PROSTATE CA (HCC): ICD-10-CM

## 2020-08-17 DIAGNOSIS — Z13.29 THYROID DISORDER SCREEN: ICD-10-CM

## 2020-08-17 DIAGNOSIS — E11.9 TYPE 2 DIABETES MELLITUS WITHOUT COMPLICATION, WITHOUT LONG-TERM CURRENT USE OF INSULIN (HCC): ICD-10-CM

## 2020-08-17 DIAGNOSIS — Z00.00 MEDICARE ANNUAL WELLNESS VISIT, INITIAL: Primary | ICD-10-CM

## 2020-08-17 LAB
ALBUMIN SERPL-MCNC: 3.8 G/DL (ref 3.4–5)
ALBUMIN/GLOB SERPL: 0.9 {RATIO} (ref 1–2)
ALP LIVER SERPL-CCNC: 78 U/L (ref 45–117)
ALT SERPL-CCNC: 36 U/L (ref 16–61)
ANION GAP SERPL CALC-SCNC: 7 MMOL/L (ref 0–18)
AST SERPL-CCNC: 16 U/L (ref 15–37)
BACTERIA UR QL AUTO: NEGATIVE /HPF
BASOPHILS # BLD AUTO: 0.03 X10(3) UL (ref 0–0.2)
BASOPHILS NFR BLD AUTO: 0.4 %
BILIRUB SERPL-MCNC: 1.1 MG/DL (ref 0.1–2)
BILIRUB UR QL: NEGATIVE
BUN BLD-MCNC: 30 MG/DL (ref 7–18)
BUN/CREAT SERPL: 20.5 (ref 10–20)
CALCIUM BLD-MCNC: 9.1 MG/DL (ref 8.5–10.1)
CHLORIDE SERPL-SCNC: 103 MMOL/L (ref 98–112)
CHOLEST SMN-MCNC: 141 MG/DL (ref ?–200)
CLARITY UR: CLEAR
CO2 SERPL-SCNC: 29 MMOL/L (ref 21–32)
COLOR UR: YELLOW
CREAT BLD-MCNC: 1.46 MG/DL (ref 0.7–1.3)
DEPRECATED RDW RBC AUTO: 39.7 FL (ref 35.1–46.3)
EOSINOPHIL # BLD AUTO: 0.25 X10(3) UL (ref 0–0.7)
EOSINOPHIL NFR BLD AUTO: 3.3 %
ERYTHROCYTE [DISTWIDTH] IN BLOOD BY AUTOMATED COUNT: 12.6 % (ref 11–15)
EST. AVERAGE GLUCOSE BLD GHB EST-MCNC: 237 MG/DL (ref 68–126)
GLOBULIN PLAS-MCNC: 4.3 G/DL (ref 2.8–4.4)
GLUCOSE BLD-MCNC: 195 MG/DL (ref 70–99)
GLUCOSE UR-MCNC: NEGATIVE MG/DL
HBA1C MFR BLD HPLC: 9.9 % (ref ?–5.7)
HCT VFR BLD AUTO: 39.3 % (ref 39–53)
HDLC SERPL-MCNC: 51 MG/DL (ref 40–59)
HGB BLD-MCNC: 12.9 G/DL (ref 13–17.5)
HGB UR QL STRIP.AUTO: NEGATIVE
HYALINE CASTS #/AREA URNS AUTO: 3 /LPF
IMM GRANULOCYTES # BLD AUTO: 0.03 X10(3) UL (ref 0–1)
IMM GRANULOCYTES NFR BLD: 0.4 %
KETONES UR-MCNC: NEGATIVE MG/DL
LDLC SERPL CALC-MCNC: 72 MG/DL (ref ?–100)
LEUKOCYTE ESTERASE UR QL STRIP.AUTO: NEGATIVE
LYMPHOCYTES # BLD AUTO: 1.8 X10(3) UL (ref 1–4)
LYMPHOCYTES NFR BLD AUTO: 24 %
M PROTEIN MFR SERPL ELPH: 8.1 G/DL (ref 6.4–8.2)
MCH RBC QN AUTO: 28.5 PG (ref 26–34)
MCHC RBC AUTO-ENTMCNC: 32.8 G/DL (ref 31–37)
MCV RBC AUTO: 86.8 FL (ref 80–100)
MONOCYTES # BLD AUTO: 0.58 X10(3) UL (ref 0.1–1)
MONOCYTES NFR BLD AUTO: 7.7 %
NEUTROPHILS # BLD AUTO: 4.8 X10 (3) UL (ref 1.5–7.7)
NEUTROPHILS # BLD AUTO: 4.8 X10(3) UL (ref 1.5–7.7)
NEUTROPHILS NFR BLD AUTO: 64.2 %
NITRITE UR QL STRIP.AUTO: NEGATIVE
NONHDLC SERPL-MCNC: 90 MG/DL (ref ?–130)
OSMOLALITY SERPL CALC.SUM OF ELEC: 300 MOSM/KG (ref 275–295)
PATIENT FASTING Y/N/NP: NO
PATIENT FASTING Y/N/NP: NO
PH UR: 5 [PH] (ref 5–8)
PLATELET # BLD AUTO: 208 10(3)UL (ref 150–450)
POTASSIUM SERPL-SCNC: 4 MMOL/L (ref 3.5–5.1)
PROT UR-MCNC: NEGATIVE MG/DL
RBC # BLD AUTO: 4.53 X10(6)UL (ref 4.3–5.7)
RBC #/AREA URNS AUTO: 1 /HPF
SODIUM SERPL-SCNC: 139 MMOL/L (ref 136–145)
SP GR UR STRIP: 1.01 (ref 1–1.03)
TRIGL SERPL-MCNC: 89 MG/DL (ref 30–149)
TSI SER-ACNC: 0.76 MIU/ML (ref 0.36–3.74)
UROBILINOGEN UR STRIP-ACNC: <2
VLDLC SERPL CALC-MCNC: 18 MG/DL (ref 0–30)
WBC # BLD AUTO: 7.5 X10(3) UL (ref 4–11)
WBC #/AREA URNS AUTO: 1 /HPF

## 2020-08-17 PROCEDURE — 3079F DIAST BP 80-89 MM HG: CPT | Performed by: FAMILY MEDICINE

## 2020-08-17 PROCEDURE — 3074F SYST BP LT 130 MM HG: CPT | Performed by: FAMILY MEDICINE

## 2020-08-17 PROCEDURE — 99214 OFFICE O/P EST MOD 30 MIN: CPT | Performed by: FAMILY MEDICINE

## 2020-08-17 PROCEDURE — 3008F BODY MASS INDEX DOCD: CPT | Performed by: FAMILY MEDICINE

## 2020-08-17 NOTE — PATIENT INSTRUCTIONS
Patient will need follow-up with neurosurgery.   Because of hardware in the cervical region we will have to have a modified radiograph with probable contrast.  All adult screening ordered and done appropriate for patient's age and gender and risk factors an

## 2020-08-21 NOTE — PROGRESS NOTES
HPI:    Patient ID: Cristian Garcia is a 53year old male.     This patient is a 58-year-old -American male here for for Medicare annual visit and for status update on any confirmed chronic medical illnesses and follow up on any previous labs or procedu Solution INSTILL 1 DROP IN BOTH EYES TWICE DAILY 10 mL 6   • BACLOFEN 10 MG Oral Tab TAKE 1 TABLET(10 MG) BY MOUTH THREE TIMES DAILY 270 tablet 0   • AMLODIPINE BESYLATE 10 MG Oral Tab TAKE 1/2 TABLET BY MOUTH TWICE DAILY 180 tablet 0   • AMITRIPTYLINE HCL Sugar (FSB) Annually Glucose (mg/dL)   Date Value   08/17/2020 195 (H)       Cardiovascular Disease Screening     LDL Annually LDL Cholesterol (mg/dL)   Date Value   08/17/2020 72        EKG One Time      Colorectal Cancer Screening      Colonoscopy Screen No flowsheet data found. Creat/alb ratio  Annually      LDL  Annually LDL Cholesterol (mg/dL)   Date Value   08/17/2020 72    No flowsheet data found.      Dilated Eye exam  Annually Data entered on: 1/10/2018   Last Dilated Eye Exam 10/10/2017     No fl Future  - URINALYSIS, ROUTINE; Future  - CBC WITH DIFFERENTIAL WITH PLATELET  - URINALYSIS, ROUTINE  - CBC W/ DIFFERENTIAL    3.  Type 2 diabetes mellitus without complication, without long-term current use of insulin (HCC)  Diabetic status update via blood

## 2020-08-31 ENCOUNTER — TELEPHONE (OUTPATIENT)
Dept: FAMILY MEDICINE CLINIC | Facility: CLINIC | Age: 55
End: 2020-08-31

## 2020-08-31 DIAGNOSIS — M54.12 CERVICAL RADICULOPATHY: ICD-10-CM

## 2020-08-31 DIAGNOSIS — Z98.890 HISTORY OF CERVICAL DISCECTOMY: ICD-10-CM

## 2020-08-31 DIAGNOSIS — M50.20 CERVICAL DISC HERNIATION: Primary | ICD-10-CM

## 2020-08-31 NOTE — TELEPHONE ENCOUNTER
Action Requested: Summary for Provider     []  Critical Lab, Recommendations Needed  [x] Need Additional Advice  []   FYI    [x]   Need Orders  [] Need Medications Sent to Pharmacy  []  Other     SUMMARY: Dr Antonia Borges, patient saw you earlier this month , c

## 2020-09-02 NOTE — TELEPHONE ENCOUNTER
Spoke with pt and MD message below given. Pt verb understanding    Dr Kole Albarado number given to schedule and central scheduling number given to schedule imaging.

## 2020-09-02 NOTE — TELEPHONE ENCOUNTER
Please call the patient and let him know that there is a referral on the chart for the neurosurgeon. Also there is an order for cervical (neck) CT and MRI per the instruction of the neurosurgeon.   It would be great if the patient had the radiographs done

## 2020-09-08 DIAGNOSIS — Z91.09 ENVIRONMENTAL ALLERGIES: ICD-10-CM

## 2020-09-08 DIAGNOSIS — I10 HYPERTENSION, UNSPECIFIED TYPE: ICD-10-CM

## 2020-09-10 ENCOUNTER — HOSPITAL ENCOUNTER (OUTPATIENT)
Dept: MRI IMAGING | Facility: HOSPITAL | Age: 55
Discharge: HOME | End: 2020-09-10
Attending: FAMILY MEDICINE
Payer: MEDICARE

## 2020-09-10 DIAGNOSIS — M50.20 CERVICAL DISC HERNIATION: ICD-10-CM

## 2020-09-10 DIAGNOSIS — M54.12 CERVICAL RADICULOPATHY: ICD-10-CM

## 2020-09-10 DIAGNOSIS — Z98.890 HISTORY OF CERVICAL DISCECTOMY: ICD-10-CM

## 2020-09-10 PROCEDURE — 72141 MRI NECK SPINE W/O DYE: CPT | Performed by: FAMILY MEDICINE

## 2020-09-10 RX ORDER — BENAZEPRIL HYDROCHLORIDE AND HYDROCHLOROTHIAZIDE 20; 12.5 MG/1; MG/1
TABLET ORAL
Qty: 90 TABLET | Refills: 2 | Status: SHIPPED | OUTPATIENT
Start: 2020-09-10 | End: 2021-05-08

## 2020-09-10 RX ORDER — POTASSIUM CHLORIDE 600 MG/1
TABLET, FILM COATED, EXTENDED RELEASE ORAL
Qty: 360 TABLET | Refills: 2 | Status: SHIPPED | OUTPATIENT
Start: 2020-09-10 | End: 2021-07-08

## 2020-09-10 RX ORDER — AMLODIPINE BESYLATE 10 MG/1
TABLET ORAL
Qty: 180 TABLET | Refills: 0 | Status: SHIPPED | OUTPATIENT
Start: 2020-09-10 | End: 2021-03-03

## 2020-09-10 RX ORDER — MONTELUKAST SODIUM 10 MG/1
TABLET ORAL
Qty: 90 TABLET | Refills: 3 | Status: SHIPPED | OUTPATIENT
Start: 2020-09-10 | End: 2021-07-08

## 2020-09-13 ENCOUNTER — HOSPITAL ENCOUNTER (OUTPATIENT)
Dept: CT IMAGING | Facility: HOSPITAL | Age: 55
Discharge: HOME | End: 2020-09-13
Attending: FAMILY MEDICINE
Payer: MEDICARE

## 2020-09-13 DIAGNOSIS — M50.20 CERVICAL DISC HERNIATION: ICD-10-CM

## 2020-09-13 DIAGNOSIS — M54.12 CERVICAL RADICULOPATHY: ICD-10-CM

## 2020-09-13 DIAGNOSIS — Z98.890 HISTORY OF CERVICAL DISCECTOMY: ICD-10-CM

## 2020-09-13 PROCEDURE — 72125 CT NECK SPINE W/O DYE: CPT | Performed by: FAMILY MEDICINE

## 2020-09-14 ENCOUNTER — TELEPHONE (OUTPATIENT)
Dept: FAMILY MEDICINE CLINIC | Facility: CLINIC | Age: 55
End: 2020-09-14

## 2020-09-14 DIAGNOSIS — Z98.1 HISTORY OF FUSION OF CERVICAL SPINE: ICD-10-CM

## 2020-09-14 DIAGNOSIS — Z98.890 HISTORY OF EXCISION OF LAMINA OF CERVICAL VERTEBRA FOR DECOMPRESSION OF SPINAL CORD: Primary | ICD-10-CM

## 2020-09-14 NOTE — TELEPHONE ENCOUNTER
Pt states Dr Mayte Briceño referred him to Dr Niranjan Chan but states cannot be seen until 10/7/20. Asking what Dr Mayte Briceño recommends for the spasms and pain in the meantime? Also inquiring about the cervical CT result that was completed yesterday.  Please review and a

## 2020-09-15 DIAGNOSIS — I10 HYPERTENSION, UNSPECIFIED TYPE: ICD-10-CM

## 2020-09-15 RX ORDER — CARVEDILOL 12.5 MG/1
TABLET ORAL
Qty: 180 TABLET | Refills: 0 | Status: SHIPPED | OUTPATIENT
Start: 2020-09-15 | End: 2020-10-11

## 2020-09-16 NOTE — TELEPHONE ENCOUNTER
Patient advised to continue with baclofen and amitriptyline. He has been referred to physiatry. And he has also establish an appointment for the neurosurgeon. All radiographs have been discussed thoroughly.

## 2020-10-10 DIAGNOSIS — I10 HYPERTENSION, UNSPECIFIED TYPE: ICD-10-CM

## 2020-10-10 DIAGNOSIS — E78.5 HYPERLIPIDEMIA, UNSPECIFIED HYPERLIPIDEMIA TYPE: ICD-10-CM

## 2020-10-10 DIAGNOSIS — I10 ESSENTIAL HYPERTENSION: ICD-10-CM

## 2020-10-11 RX ORDER — ATORVASTATIN CALCIUM 40 MG/1
TABLET, FILM COATED ORAL
Qty: 90 TABLET | Refills: 1 | Status: SHIPPED | OUTPATIENT
Start: 2020-10-11 | End: 2021-04-08

## 2020-10-11 RX ORDER — CARVEDILOL 12.5 MG/1
TABLET ORAL
Qty: 180 TABLET | Refills: 0 | Status: SHIPPED | OUTPATIENT
Start: 2020-10-11 | End: 2021-03-29

## 2020-10-11 RX ORDER — HUMAN INSULIN 100 [USP'U]/ML
INJECTION, SUSPENSION SUBCUTANEOUS
Qty: 30 ML | Refills: 0 | Status: SHIPPED | OUTPATIENT
Start: 2020-10-11 | End: 2020-10-22

## 2020-10-11 RX ORDER — FUROSEMIDE 40 MG/1
TABLET ORAL
Qty: 180 TABLET | Refills: 0 | Status: SHIPPED | OUTPATIENT
Start: 2020-10-11 | End: 2021-01-08

## 2020-10-11 RX ORDER — AMITRIPTYLINE HYDROCHLORIDE 25 MG/1
TABLET, FILM COATED ORAL
Qty: 90 TABLET | Refills: 1 | Status: SHIPPED | OUTPATIENT
Start: 2020-10-11 | End: 2021-03-11

## 2020-10-15 RX ORDER — NAPROXEN SODIUM 220 MG
TABLET ORAL
Qty: 100 EACH | Refills: 5 | Status: SHIPPED | OUTPATIENT
Start: 2020-10-15

## 2020-10-19 ENCOUNTER — TELEPHONE (OUTPATIENT)
Dept: FAMILY MEDICINE CLINIC | Facility: CLINIC | Age: 55
End: 2020-10-19

## 2020-10-19 NOTE — TELEPHONE ENCOUNTER
Routed to Dr Lorenzo Pennington  for advise, thanks.     Future Appointments   Date Time Provider Mandy Patel   12/14/2020 11:00 AM Jacqueline Acevedo MD [facility] [address] [place] [address]   1/14/2021  3:00 PM Ingrid Oneal MD [facility]

## 2020-10-19 NOTE — TELEPHONE ENCOUNTER
Patient is calling regarding script for NOVOLIN 70/30 (70-30) 100 UNIT/ML Subcutaneous Suspension. Patient states insurance does not cover Novolin and is requesting alternative Humulin.  Patient states pharmacy faxed the doctor's office regarding this, this

## 2020-10-21 NOTE — TELEPHONE ENCOUNTER
Pt called to follow up on medication change request. States he has been out of insulin for eight days. States his blood sugars have been 200-300 range.  Yesterday his blood sugar was 411 about 1 1/2 hours after eating a meal. Pt has not yet checked his bloo

## 2020-10-22 RX ORDER — INSULIN NPH HUM/REG INSULIN HM 70-30/ML
32 VIAL (ML) SUBCUTANEOUS
Qty: 30 ML | Refills: 0 | Status: SHIPPED | OUTPATIENT
Start: 2020-10-22 | End: 2020-12-07

## 2020-10-22 NOTE — TELEPHONE ENCOUNTER
Updated Rx sent to pharmacy. Spoke w/ Kami Rothed, Rx was approved and is being processed. Left message for pt advising of the Rx status.

## 2020-12-07 RX ORDER — INSULIN NPH HUM/REG INSULIN HM 70-30/ML
VIAL (ML) SUBCUTANEOUS
Qty: 30 ML | Refills: 0 | Status: SHIPPED | OUTPATIENT
Start: 2020-12-07 | End: 2021-03-26

## 2020-12-14 ENCOUNTER — TELEPHONE (OUTPATIENT)
Dept: FAMILY MEDICINE CLINIC | Facility: CLINIC | Age: 55
End: 2020-12-14

## 2020-12-21 ENCOUNTER — OFFICE VISIT (OUTPATIENT)
Dept: FAMILY MEDICINE CLINIC | Facility: CLINIC | Age: 55
End: 2020-12-21
Payer: MEDICAID

## 2020-12-21 VITALS
RESPIRATION RATE: 18 BRPM | TEMPERATURE: 97 F | WEIGHT: 290 LBS | HEIGHT: 69 IN | DIASTOLIC BLOOD PRESSURE: 72 MMHG | BODY MASS INDEX: 42.95 KG/M2 | SYSTOLIC BLOOD PRESSURE: 122 MMHG

## 2020-12-21 DIAGNOSIS — Z98.890 HISTORY OF EXCISION OF LAMINA OF CERVICAL VERTEBRA FOR DECOMPRESSION OF SPINAL CORD: ICD-10-CM

## 2020-12-21 DIAGNOSIS — E11.9 TYPE 2 DIABETES MELLITUS WITHOUT COMPLICATION, WITHOUT LONG-TERM CURRENT USE OF INSULIN (HCC): ICD-10-CM

## 2020-12-21 DIAGNOSIS — Z12.5 PROSTATE CANCER SCREENING: ICD-10-CM

## 2020-12-21 DIAGNOSIS — C61 PROSTATE CANCER (HCC): ICD-10-CM

## 2020-12-21 DIAGNOSIS — I10 ESSENTIAL HYPERTENSION: ICD-10-CM

## 2020-12-21 DIAGNOSIS — M54.6 CHRONIC BILATERAL THORACIC BACK PAIN: ICD-10-CM

## 2020-12-21 DIAGNOSIS — G89.29 CHRONIC BILATERAL THORACIC BACK PAIN: ICD-10-CM

## 2020-12-21 DIAGNOSIS — Z11.3 ROUTINE SCREENING FOR STI (SEXUALLY TRANSMITTED INFECTION): Primary | ICD-10-CM

## 2020-12-21 DIAGNOSIS — R16.0 HEPATOMEGALY: ICD-10-CM

## 2020-12-21 PROCEDURE — 3074F SYST BP LT 130 MM HG: CPT | Performed by: FAMILY MEDICINE

## 2020-12-21 PROCEDURE — 3008F BODY MASS INDEX DOCD: CPT | Performed by: FAMILY MEDICINE

## 2020-12-21 PROCEDURE — 99214 OFFICE O/P EST MOD 30 MIN: CPT | Performed by: FAMILY MEDICINE

## 2020-12-21 PROCEDURE — 3078F DIAST BP <80 MM HG: CPT | Performed by: FAMILY MEDICINE

## 2020-12-22 NOTE — PATIENT INSTRUCTIONS
Pain management via prescription medications as needed. Medication reviewed and renewed where needed and appropriate. Recommend weight loss via daily exercising and consistent healthy dietary changes.   Encouraged physical fitness and daily physical activ

## 2020-12-22 NOTE — PROGRESS NOTES
HPI:    Patient ID: Jocy Velez is a 63year old male.     This patient is a 58-year-old -American male who is well established in our clinic was treated for hypertension and diabetes who presents today for STI screening as there is a history for ge • AMLODIPINE BESYLATE 10 MG Oral Tab TAKE ONE-HALF TABLET BY MOUTH TWICE DAILY 180 tablet 0   • BENAZEPRIL-HYDROCHLOROTHIAZIDE 20-12.5 MG Oral Tab TAKE 1 TABLET BY MOUTH EVERY DAY 90 tablet 2   • MONTELUKAST SODIUM 10 MG Oral Tab TAKE 1 TABLET(10 MG) BY MO Seasonal                     12/21/20 1815   Resp: 18   Temp: 97 °F (36.1 °C)         PHYSICAL EXAM:   Physical Exam    Constitutional: He is oriented to person, place, and time and obese. He appears distressed. Neck: No thyromegaly present.        Poste Requested Prescriptions      No prescriptions requested or ordered in this encounter       Imaging & Referrals:  PHYSIATRY - INTERNAL  ORTHOPEDIC - INTERNAL  US LIVER (DET=[id number])  Patient Instructions   Pain management via prescription medications as neede

## 2021-01-05 RX ORDER — TAMSULOSIN HYDROCHLORIDE 0.4 MG/1
CAPSULE ORAL
Qty: 90 CAPSULE | Refills: 3 | OUTPATIENT
Start: 2021-01-05

## 2021-01-05 NOTE — TELEPHONE ENCOUNTER
Refill requested for Tamsulosin. I called the Pharm and confirmed that he has refills left on his previous prescription. This refill request will be denied for now because it is too soon.

## 2021-01-08 DIAGNOSIS — I10 ESSENTIAL HYPERTENSION: ICD-10-CM

## 2021-01-08 RX ORDER — FUROSEMIDE 40 MG/1
TABLET ORAL
Qty: 180 TABLET | Refills: 0 | Status: SHIPPED | OUTPATIENT
Start: 2021-01-08 | End: 2021-04-08

## 2021-01-14 ENCOUNTER — LAB ENCOUNTER (OUTPATIENT)
Dept: LAB | Facility: HOSPITAL | Age: 56
End: 2021-01-14
Attending: FAMILY MEDICINE
Payer: MEDICARE

## 2021-01-14 ENCOUNTER — OFFICE VISIT (OUTPATIENT)
Dept: OPHTHALMOLOGY | Facility: CLINIC | Age: 56
End: 2021-01-14
Payer: COMMERCIAL

## 2021-01-14 ENCOUNTER — TELEPHONE (OUTPATIENT)
Dept: ENDOCRINOLOGY CLINIC | Facility: CLINIC | Age: 56
End: 2021-01-14

## 2021-01-14 DIAGNOSIS — H40.9 GLAUCOMA, UNSPECIFIED GLAUCOMA TYPE, UNSPECIFIED LATERALITY: ICD-10-CM

## 2021-01-14 DIAGNOSIS — Z11.3 ROUTINE SCREENING FOR STI (SEXUALLY TRANSMITTED INFECTION): ICD-10-CM

## 2021-01-14 DIAGNOSIS — E11.9 TYPE 2 DIABETES MELLITUS WITHOUT COMPLICATION, WITHOUT LONG-TERM CURRENT USE OF INSULIN (HCC): ICD-10-CM

## 2021-01-14 DIAGNOSIS — H25.13 AGE-RELATED NUCLEAR CATARACT OF BOTH EYES: ICD-10-CM

## 2021-01-14 DIAGNOSIS — Z79.4 CONTROLLED TYPE 2 DIABETES MELLITUS WITH RIGHT EYE AFFECTED BY MILD NONPROLIFERATIVE RETINOPATHY WITHOUT MACULAR EDEMA, WITH LONG-TERM CURRENT USE OF INSULIN (HCC): Primary | ICD-10-CM

## 2021-01-14 DIAGNOSIS — I10 ESSENTIAL HYPERTENSION: ICD-10-CM

## 2021-01-14 DIAGNOSIS — E11.3291 CONTROLLED TYPE 2 DIABETES MELLITUS WITH RIGHT EYE AFFECTED BY MILD NONPROLIFERATIVE RETINOPATHY WITHOUT MACULAR EDEMA, WITH LONG-TERM CURRENT USE OF INSULIN (HCC): Primary | ICD-10-CM

## 2021-01-14 DIAGNOSIS — C61 PROSTATE CANCER (HCC): ICD-10-CM

## 2021-01-14 LAB
ALBUMIN SERPL-MCNC: 3.6 G/DL (ref 3.4–5)
ALBUMIN/GLOB SERPL: 0.9 {RATIO} (ref 1–2)
ALP LIVER SERPL-CCNC: 78 U/L
ALT SERPL-CCNC: 43 U/L
ANION GAP SERPL CALC-SCNC: 2 MMOL/L (ref 0–18)
AST SERPL-CCNC: 19 U/L (ref 15–37)
BILIRUB SERPL-MCNC: 0.8 MG/DL (ref 0.1–2)
BILIRUB UR QL: NEGATIVE
BUN BLD-MCNC: 12 MG/DL (ref 7–18)
BUN/CREAT SERPL: 11.7 (ref 10–20)
CALCIUM BLD-MCNC: 9.4 MG/DL (ref 8.5–10.1)
CHLORIDE SERPL-SCNC: 107 MMOL/L (ref 98–112)
CHOLEST SMN-MCNC: 160 MG/DL (ref ?–200)
CO2 SERPL-SCNC: 33 MMOL/L (ref 21–32)
COLOR UR: YELLOW
CREAT BLD-MCNC: 1.03 MG/DL
EST. AVERAGE GLUCOSE BLD GHB EST-MCNC: 243 MG/DL (ref 68–126)
GLOBULIN PLAS-MCNC: 4.2 G/DL (ref 2.8–4.4)
GLUCOSE BLD-MCNC: 131 MG/DL (ref 70–99)
GLUCOSE UR-MCNC: 50 MG/DL
HAV AB SER QL IA: NONREACTIVE
HBA1C MFR BLD HPLC: 10.1 % (ref ?–5.7)
HBV CORE AB SERPL QL IA: NONREACTIVE
HBV SURFACE AB SER QL: NONREACTIVE
HBV SURFACE AB SERPL IA-ACNC: <3.1 MIU/ML
HBV SURFACE AG SERPL QL IA: NONREACTIVE
HCV AB SERPL QL IA: NONREACTIVE
HDLC SERPL-MCNC: 66 MG/DL (ref 40–59)
HGB UR QL STRIP.AUTO: NEGATIVE
HYALINE CASTS #/AREA URNS AUTO: 6 /LPF
KETONES UR-MCNC: NEGATIVE MG/DL
LDLC SERPL CALC-MCNC: 78 MG/DL (ref ?–100)
LEUKOCYTE ESTERASE UR QL STRIP.AUTO: NEGATIVE
M PROTEIN MFR SERPL ELPH: 7.8 G/DL (ref 6.4–8.2)
NITRITE UR QL STRIP.AUTO: NEGATIVE
NONHDLC SERPL-MCNC: 94 MG/DL (ref ?–130)
OSMOLALITY SERPL CALC.SUM OF ELEC: 296 MOSM/KG (ref 275–295)
PATIENT FASTING Y/N/NP: YES
PATIENT FASTING Y/N/NP: YES
PH UR: 6 [PH] (ref 5–8)
POTASSIUM SERPL-SCNC: 3.5 MMOL/L (ref 3.5–5.1)
PROT UR-MCNC: 30 MG/DL
PROT UR-MCNC: 44.6 MG/DL
PSA SERPL-MCNC: 0.72 NG/ML (ref ?–4)
RBC #/AREA URNS AUTO: 1 /HPF
SODIUM SERPL-SCNC: 142 MMOL/L (ref 136–145)
SP GR UR STRIP: 1.01 (ref 1–1.03)
TRIGL SERPL-MCNC: 82 MG/DL (ref 30–149)
UROBILINOGEN UR STRIP-ACNC: <2
VLDLC SERPL CALC-MCNC: 16 MG/DL (ref 0–30)
WBC #/AREA URNS AUTO: <1 /HPF

## 2021-01-14 PROCEDURE — 86708 HEPATITIS A ANTIBODY: CPT

## 2021-01-14 PROCEDURE — 86706 HEP B SURFACE ANTIBODY: CPT

## 2021-01-14 PROCEDURE — 3046F HEMOGLOBIN A1C LEVEL >9.0%: CPT | Performed by: FAMILY MEDICINE

## 2021-01-14 PROCEDURE — 80500 HEPATITIS A B + C PROFILE: CPT

## 2021-01-14 PROCEDURE — 84156 ASSAY OF PROTEIN URINE: CPT

## 2021-01-14 PROCEDURE — 84153 ASSAY OF PSA TOTAL: CPT

## 2021-01-14 PROCEDURE — 80061 LIPID PANEL: CPT

## 2021-01-14 PROCEDURE — 86780 TREPONEMA PALLIDUM: CPT

## 2021-01-14 PROCEDURE — 36415 COLL VENOUS BLD VENIPUNCTURE: CPT

## 2021-01-14 PROCEDURE — 81001 URINALYSIS AUTO W/SCOPE: CPT

## 2021-01-14 PROCEDURE — 83036 HEMOGLOBIN GLYCOSYLATED A1C: CPT

## 2021-01-14 PROCEDURE — 80053 COMPREHEN METABOLIC PANEL: CPT

## 2021-01-14 PROCEDURE — 99203 OFFICE O/P NEW LOW 30 MIN: CPT | Performed by: OPHTHALMOLOGY

## 2021-01-14 PROCEDURE — 86803 HEPATITIS C AB TEST: CPT

## 2021-01-14 PROCEDURE — 3046F HEMOGLOBIN A1C LEVEL >9.0%: CPT | Performed by: INTERNAL MEDICINE

## 2021-01-14 PROCEDURE — 86704 HEP B CORE ANTIBODY TOTAL: CPT

## 2021-01-14 PROCEDURE — 87340 HEPATITIS B SURFACE AG IA: CPT

## 2021-01-14 PROCEDURE — 87389 HIV-1 AG W/HIV-1&-2 AB AG IA: CPT

## 2021-01-14 NOTE — ASSESSMENT & PLAN NOTE
Continue all glaucoma drops as directed. Stressed importance of following up with glaucoma specialist to prevent progression of glaucoma. Discussed that intraocular pressure is too high at this time.   (28 in the right eye and 24 in the left eye)

## 2021-01-14 NOTE — PATIENT INSTRUCTIONS
Glaucoma  Continue all glaucoma drops as directed. Stressed importance of following up with glaucoma specialist to prevent progression of glaucoma. Discussed that intraocular pressure is too high at this time.   (28 in the right eye and 24 in the left e

## 2021-01-14 NOTE — PROGRESS NOTES
Junito Easley is a 63year old male.     HPI:     HPI     Consult      Additional comments: Per Dr. Kelli Avila               Diabetic Eye Exam      Additional comments: Pt has been a diabetic for 10+  years       Pt's diabetes is currently controlled by insuli Injections [SITES, # SITES NOT STATED].  provider: Tamara Loera); back surgery (\"5340\" ) (\"3/2011 surgical.  cervical spine disease\" ); back surgery (cervical fusion); hc arthrocentesis or inject intermed joint w/o us (Bilateral) (carpal tunnel in MG) BY MOUTH EVERY NIGHT 90 tablet 3   • tamsulosin (FLOMAX) cap Take 1 capsule (0.4 mg total) by mouth daily. 90 capsule 3   • Vitamin C 500 MG Oral Tab Take 500 mg by mouth daily. • vitamin E 100 UNITS Oral Cap Take 100 Units by mouth daily.      • Co 20/30          Tonometry (Applanation, 3:27 PM)       Right Left    Pressure 28 24          Pupils       Pupils    Right PERRL    Left PERRL          Extraocular Movement       Right Left     Full, Ortho Full, Ortho          Dilation     Both eyes: 1.0% My control. Age-related nuclear cataract of both eyes  Discussed mild cataracts in both eyes that are not affecting vision and are not surgical at this time. No orders of the defined types were placed in this encounter.       Meds This Visit:  Requ

## 2021-01-14 NOTE — TELEPHONE ENCOUNTER
Dr. Claire Figueroa)  Spoke to patient -states he was having insurance issues    Scheduled f/u 2/22/21

## 2021-01-14 NOTE — ASSESSMENT & PLAN NOTE
Diabetes type II: mild background diabetic retinopathy in the right eye only, no signs of neovascularization noted. No treatment necessary at this time. Patient was instructed to monitor vision for sudden changes and to call if visual changes noted.   Dis

## 2021-01-14 NOTE — TELEPHONE ENCOUNTER
Received correspondence from eye doctor.    I have not seen the patient since 2018  If he is interested please offer first available appointment  Thanks

## 2021-01-15 LAB — T PALLIDUM AB SER QL: NEGATIVE

## 2021-02-22 ENCOUNTER — LAB ENCOUNTER (OUTPATIENT)
Dept: LAB | Facility: HOSPITAL | Age: 56
End: 2021-02-22
Attending: FAMILY MEDICINE
Payer: MEDICARE

## 2021-02-22 DIAGNOSIS — C61 MALIGNANT NEOPLASM OF PROSTATE (HCC): ICD-10-CM

## 2021-02-22 LAB — PSA SERPL-MCNC: 0.59 NG/ML (ref ?–4)

## 2021-02-22 PROCEDURE — 84153 ASSAY OF PSA TOTAL: CPT

## 2021-02-22 PROCEDURE — 36415 COLL VENOUS BLD VENIPUNCTURE: CPT

## 2021-02-23 ENCOUNTER — NURSE TRIAGE (OUTPATIENT)
Dept: FAMILY MEDICINE CLINIC | Facility: CLINIC | Age: 56
End: 2021-02-23

## 2021-02-23 ENCOUNTER — OFFICE VISIT (OUTPATIENT)
Dept: ENDOCRINOLOGY CLINIC | Facility: CLINIC | Age: 56
End: 2021-02-23
Payer: COMMERCIAL

## 2021-02-23 VITALS
HEART RATE: 85 BPM | WEIGHT: 306 LBS | DIASTOLIC BLOOD PRESSURE: 93 MMHG | SYSTOLIC BLOOD PRESSURE: 142 MMHG | BODY MASS INDEX: 45 KG/M2

## 2021-02-23 DIAGNOSIS — E11.65 TYPE 2 DIABETES MELLITUS WITH HYPERGLYCEMIA, WITH LONG-TERM CURRENT USE OF INSULIN (HCC): Primary | ICD-10-CM

## 2021-02-23 DIAGNOSIS — Z79.4 TYPE 2 DIABETES MELLITUS WITH HYPERGLYCEMIA, WITH LONG-TERM CURRENT USE OF INSULIN (HCC): Primary | ICD-10-CM

## 2021-02-23 DIAGNOSIS — E66.01 MORBID OBESITY WITH BMI OF 40.0-44.9, ADULT (HCC): ICD-10-CM

## 2021-02-23 DIAGNOSIS — E78.5 DYSLIPIDEMIA: ICD-10-CM

## 2021-02-23 DIAGNOSIS — E04.1 THYROID NODULE: ICD-10-CM

## 2021-02-23 LAB
GLUCOSE BLOOD: 200
TEST STRIP LOT #: NORMAL NUMERIC

## 2021-02-23 PROCEDURE — 82947 ASSAY GLUCOSE BLOOD QUANT: CPT | Performed by: INTERNAL MEDICINE

## 2021-02-23 PROCEDURE — 3080F DIAST BP >= 90 MM HG: CPT | Performed by: INTERNAL MEDICINE

## 2021-02-23 PROCEDURE — 3077F SYST BP >= 140 MM HG: CPT | Performed by: INTERNAL MEDICINE

## 2021-02-23 PROCEDURE — 36416 COLLJ CAPILLARY BLOOD SPEC: CPT | Performed by: INTERNAL MEDICINE

## 2021-02-23 PROCEDURE — 99204 OFFICE O/P NEW MOD 45 MIN: CPT | Performed by: INTERNAL MEDICINE

## 2021-02-23 NOTE — H&P
New Patient Evaluation - History and Physical    CONSULT - Reason for Visit:  DM, Dyslipidemia, BMI > 36  Requesting Physician: Dr. Blaine Multani:  Patient presents with:  Diabetes: Pt is present to follow up with the doctor        HISTORY OF \"3/2011 surgical.  cervical spine disease\"    • BACK SURGERY      cervical fusion   • HC ARTHROCENTESIS OR INJECT INTERMED JOINT W/O US Bilateral     carpal tunnel injections bilaterally.   Louis Silva MD   • [facility] [place] [facility]. INJECT SINGLE MULTIPLE TRIGGER POINT 1 OR Inject as directed.      • Dorzolamide HCl 2 % Ophthalmic Solution INSTILL 1 DROP IN BOTH EYES TWICE DAILY 10 mL 6   • BACLOFEN 10 MG Oral Tab TAKE 1 TABLET(10 MG) BY MOUTH THREE TIMES DAILY 270 tablet 0   • LATANOPROST 0.005 % Ophthalmic Solution INSTILL 1 Father    • Hypertension Father    • Diabetes Father    • Cancer Father         bladder   • Glaucoma Father    • Diabetes Mother    • Stroke Mother         CVA(stroke)   • Cancer Maternal Aunt 80        stomach (cause of death)   • Macular degeneration Neg 10.1 %       ASSESSMENT AND PLAN:      1. Type 2 DM: worsening control      Plan:  Discussed the pathogenesis, natural course of diabetes.  Patient understands the importance of glycemic control and the implications of uncontrolled diabetes including Diabeti 95-97% of nodules are benign  -Discussed recommendation to perform FNA biopsy of nodules greater than 1cm in size  -Discussed that if nodule is benign then plan to follow with yearly thyroid ultrasound     He does not have any RF for thyroid cancer.    No c

## 2021-02-23 NOTE — TELEPHONE ENCOUNTER
Action Requested: Summary for Provider     []  Critical Lab, Recommendations Needed  [] Need Additional Advice  [x]   FYI    []   Need Orders  [] Need Medications Sent to Pharmacy  []  Other     SUMMARY: Patient indicated that for the past few months has b

## 2021-02-23 NOTE — TELEPHONE ENCOUNTER
Triage can this pt be contacted and triaged for abdominal and side pain to see if a sooner appointment is advised or needed? Thanks.

## 2021-02-23 NOTE — TELEPHONE ENCOUNTER
Called patient and his phone was not working. The conversation kept breaking up. Advised to call us after 8 am tm.

## 2021-02-23 NOTE — TELEPHONE ENCOUNTER
Pt states that Dr. Isabel Panda was going to send new RX for Ozempic to his pharmacy. RX was not received. Please call.

## 2021-02-24 ENCOUNTER — HOSPITAL ENCOUNTER (OUTPATIENT)
Dept: ULTRASOUND IMAGING | Age: 56
Discharge: HOME | End: 2021-02-24
Attending: INTERNAL MEDICINE
Payer: MEDICARE

## 2021-02-24 DIAGNOSIS — E04.1 THYROID NODULE: ICD-10-CM

## 2021-02-24 PROCEDURE — 76536 US EXAM OF HEAD AND NECK: CPT | Performed by: INTERNAL MEDICINE

## 2021-02-24 RX ORDER — SEMAGLUTIDE 1.34 MG/ML
0.25 INJECTION, SOLUTION SUBCUTANEOUS WEEKLY
Qty: 4.5 ML | Refills: 0 | Status: SHIPPED | OUTPATIENT
Start: 2021-02-24 | End: 2021-04-25

## 2021-02-24 NOTE — TELEPHONE ENCOUNTER
Pt is calling again to see about the Ozempic prescription  said she would send for him.  Please call

## 2021-02-24 NOTE — TELEPHONE ENCOUNTER
Dr. Siva Gurrola,  Per LOV (2/23/21) patient to start ozempic 0.25mg/week     RX pended -thanks Floor

## 2021-02-25 NOTE — TELEPHONE ENCOUNTER
Patient states that he did not go to ER because he felt better after he spoke to RN. He states he thought maybe it was something he ate. Currently he is not experiencing any pain.  He does feel like he should still get checked to see if imaging of his abdom

## 2021-02-26 NOTE — PROGRESS NOTES
HPI:    Patient ID: Rosio Hernandez is a 63year old male.     This patient is a 48-year-old -American gentleman who is well-established at our clinic was treated for hypertension, diabetes, successfully put into remission regarding prostate CA and no • AMLODIPINE BESYLATE 10 MG Oral Tab TAKE ONE-HALF TABLET BY MOUTH TWICE DAILY 180 tablet 0   • POTASSIUM CHLORIDE ER 8 MEQ Oral Tab CR TAKE 2 TABLETS BY MOUTH TWICE DAILY 360 tablet 2   • BENAZEPRIL-HYDROCHLOROTHIAZIDE 20-12.5 MG Oral Tab TAKE 1 TABLET BY Constitutional: He is oriented to person, place, and time and obese. He appears distressed. Neck: No thyromegaly present. Cardiovascular: Normal rate and regular rhythm. No murmur heard.   Pulmonary/Chest: Effort normal and breath sounds normal. No r Patient also encouraged to find an exercise 20 minutes a day 4 days out of the week that does not bring in pain to his body for both tone and endorphin release. Patient has been advised to restart his probiotics.   Metamucil or FiberCon tablet at least twi

## 2021-02-26 NOTE — PATIENT INSTRUCTIONS
Probiotics recommended. Patient has been encouraged to increase water intake. Patient also encouraged to find an exercise 20 minutes a day 4 days out of the week that does not bring in pain to his body for both tone and endorphin release.   Patient has be
ACP

## 2021-03-01 ENCOUNTER — TELEPHONE (OUTPATIENT)
Dept: ENDOCRINOLOGY CLINIC | Facility: CLINIC | Age: 56
End: 2021-03-01

## 2021-03-01 ENCOUNTER — HOSPITAL ENCOUNTER (OUTPATIENT)
Dept: ULTRASOUND IMAGING | Facility: HOSPITAL | Age: 56
Discharge: HOME | End: 2021-03-01
Attending: FAMILY MEDICINE
Payer: MEDICARE

## 2021-03-01 DIAGNOSIS — R16.0 HEPATOMEGALY: ICD-10-CM

## 2021-03-01 DIAGNOSIS — Z01.812 ENCOUNTER FOR PREPROCEDURE SCREENING LABORATORY TESTING FOR COVID-19: ICD-10-CM

## 2021-03-01 DIAGNOSIS — E04.1 THYROID NODULE: Primary | ICD-10-CM

## 2021-03-01 DIAGNOSIS — Z20.822 ENCOUNTER FOR PREPROCEDURE SCREENING LABORATORY TESTING FOR COVID-19: ICD-10-CM

## 2021-03-01 PROCEDURE — 76705 ECHO EXAM OF ABDOMEN: CPT | Performed by: FAMILY MEDICINE

## 2021-03-02 NOTE — TELEPHONE ENCOUNTER
FNA of the following thyroid nodules:     Right thyroid nodule measuring  1.45 x 0.99 x 1.39 cm  Left thyroid nodule measuring 1.40 x 0.73 x 1.01 cm  Isthmic nodules measuring 1.01 x 0.55 x 1.07 cm and 0.64 x 0.54 x 1.11 cm    Order is in   Will need a cov

## 2021-03-02 NOTE — TELEPHONE ENCOUNTER
Dr. Groves Room to patient to relay message below - patient states he would like to have nodule(s) biopsied.   Please advise-thanks

## 2021-03-02 NOTE — TELEPHONE ENCOUNTER
Thyroid nodules are mostly stable in size, expect one of the left sided nodules that has increased very slightly in size.    Ideally nodules over 1 cm in size meet criteria for biopsy   Since we have never biopsied the nodules, we should do that to make steve

## 2021-03-03 RX ORDER — AMLODIPINE BESYLATE 10 MG/1
TABLET ORAL
Qty: 180 TABLET | Refills: 0 | Status: SHIPPED | OUTPATIENT
Start: 2021-03-03 | End: 2021-09-08

## 2021-03-04 NOTE — TELEPHONE ENCOUNTER
Patient was contacted and informed him that FNA order is in the system and the need for Covid testing. RN advised him that central scheduling will go over the instruction with him and denied further questions at this time.

## 2021-03-08 ENCOUNTER — HOSPITAL ENCOUNTER (OUTPATIENT)
Dept: CT IMAGING | Facility: HOSPITAL | Age: 56
Discharge: HOME | End: 2021-03-08
Attending: FAMILY MEDICINE
Payer: MEDICARE

## 2021-03-08 DIAGNOSIS — R10.84 GENERALIZED ABDOMINAL PAIN: ICD-10-CM

## 2021-03-08 DIAGNOSIS — Z87.19 HISTORY OF GASTRIC POLYP: ICD-10-CM

## 2021-03-08 LAB — CREAT BLD-MCNC: 1.4 MG/DL

## 2021-03-08 PROCEDURE — 74177 CT ABD & PELVIS W/CONTRAST: CPT | Performed by: FAMILY MEDICINE

## 2021-03-08 PROCEDURE — 82565 ASSAY OF CREATININE: CPT

## 2021-03-09 ENCOUNTER — HOSPITAL ENCOUNTER (OUTPATIENT)
Dept: GENERAL RADIOLOGY | Facility: HOSPITAL | Age: 56
Discharge: HOME | End: 2021-03-09
Attending: ORTHOPAEDIC SURGERY
Payer: MEDICARE

## 2021-03-09 ENCOUNTER — TELEPHONE (OUTPATIENT)
Dept: FAMILY MEDICINE CLINIC | Facility: CLINIC | Age: 56
End: 2021-03-09

## 2021-03-09 DIAGNOSIS — R52 PAIN: ICD-10-CM

## 2021-03-09 PROCEDURE — 72072 X-RAY EXAM THORAC SPINE 3VWS: CPT | Performed by: ORTHOPAEDIC SURGERY

## 2021-03-09 PROCEDURE — 72170 X-RAY EXAM OF PELVIS: CPT | Performed by: ORTHOPAEDIC SURGERY

## 2021-03-09 PROCEDURE — 72110 X-RAY EXAM L-2 SPINE 4/>VWS: CPT | Performed by: ORTHOPAEDIC SURGERY

## 2021-03-09 NOTE — TELEPHONE ENCOUNTER
Patient has an appointment with Dr. Mathew Tsang this morning. Patient had a CT and MRI on his spin in September 2020. Patient is requesting to have the results faxed to Dr. Perez [facility] at [phone].

## 2021-03-11 ENCOUNTER — NURSE TRIAGE (OUTPATIENT)
Dept: FAMILY MEDICINE CLINIC | Facility: CLINIC | Age: 56
End: 2021-03-11

## 2021-03-11 ENCOUNTER — HOSPITAL ENCOUNTER (OUTPATIENT)
Dept: ULTRASOUND IMAGING | Facility: HOSPITAL | Age: 56
Discharge: HOME | End: 2021-03-11
Attending: PHYSICAL MEDICINE & REHABILITATION
Payer: MEDICARE

## 2021-03-11 ENCOUNTER — OFFICE VISIT (OUTPATIENT)
Dept: FAMILY MEDICINE CLINIC | Facility: CLINIC | Age: 56
End: 2021-03-11
Payer: COMMERCIAL

## 2021-03-11 ENCOUNTER — TELEPHONE (OUTPATIENT)
Dept: NEUROLOGY | Facility: CLINIC | Age: 56
End: 2021-03-11

## 2021-03-11 ENCOUNTER — OFFICE VISIT (OUTPATIENT)
Dept: NEUROLOGY | Facility: CLINIC | Age: 56
End: 2021-03-11
Payer: COMMERCIAL

## 2021-03-11 VITALS
RESPIRATION RATE: 20 BRPM | HEIGHT: 69 IN | TEMPERATURE: 98 F | HEART RATE: 88 BPM | SYSTOLIC BLOOD PRESSURE: 154 MMHG | DIASTOLIC BLOOD PRESSURE: 92 MMHG | WEIGHT: 300 LBS | BODY MASS INDEX: 44.43 KG/M2

## 2021-03-11 VITALS
HEIGHT: 69 IN | WEIGHT: 300 LBS | TEMPERATURE: 97 F | SYSTOLIC BLOOD PRESSURE: 122 MMHG | RESPIRATION RATE: 18 BRPM | DIASTOLIC BLOOD PRESSURE: 78 MMHG | BODY MASS INDEX: 44.43 KG/M2

## 2021-03-11 DIAGNOSIS — M79.661 PAIN AND SWELLING OF RIGHT LOWER LEG: ICD-10-CM

## 2021-03-11 DIAGNOSIS — M79.89 PAIN AND SWELLING OF RIGHT LOWER LEG: Primary | ICD-10-CM

## 2021-03-11 DIAGNOSIS — L03.115 CELLULITIS OF RIGHT ANTERIOR LOWER LEG: ICD-10-CM

## 2021-03-11 DIAGNOSIS — R60.0 BILATERAL LEG EDEMA: Primary | ICD-10-CM

## 2021-03-11 DIAGNOSIS — R60.0 BILATERAL LEG EDEMA: ICD-10-CM

## 2021-03-11 DIAGNOSIS — I10 ESSENTIAL HYPERTENSION: ICD-10-CM

## 2021-03-11 DIAGNOSIS — M79.89 PAIN AND SWELLING OF RIGHT LOWER LEG: ICD-10-CM

## 2021-03-11 DIAGNOSIS — M54.12 CERVICAL RADICULITIS: ICD-10-CM

## 2021-03-11 DIAGNOSIS — M79.661 PAIN AND SWELLING OF RIGHT LOWER LEG: Primary | ICD-10-CM

## 2021-03-11 PROCEDURE — 99214 OFFICE O/P EST MOD 30 MIN: CPT | Performed by: FAMILY MEDICINE

## 2021-03-11 PROCEDURE — 3074F SYST BP LT 130 MM HG: CPT | Performed by: FAMILY MEDICINE

## 2021-03-11 PROCEDURE — 93971 EXTREMITY STUDY: CPT | Performed by: PHYSICAL MEDICINE & REHABILITATION

## 2021-03-11 PROCEDURE — 3008F BODY MASS INDEX DOCD: CPT | Performed by: FAMILY MEDICINE

## 2021-03-11 PROCEDURE — 3077F SYST BP >= 140 MM HG: CPT | Performed by: PHYSICAL MEDICINE & REHABILITATION

## 2021-03-11 PROCEDURE — 3080F DIAST BP >= 90 MM HG: CPT | Performed by: PHYSICAL MEDICINE & REHABILITATION

## 2021-03-11 PROCEDURE — 3078F DIAST BP <80 MM HG: CPT | Performed by: FAMILY MEDICINE

## 2021-03-11 PROCEDURE — 3008F BODY MASS INDEX DOCD: CPT | Performed by: PHYSICAL MEDICINE & REHABILITATION

## 2021-03-11 PROCEDURE — 99204 OFFICE O/P NEW MOD 45 MIN: CPT | Performed by: PHYSICAL MEDICINE & REHABILITATION

## 2021-03-11 RX ORDER — SULFAMETHOXAZOLE AND TRIMETHOPRIM 800; 160 MG/1; MG/1
1 TABLET ORAL 2 TIMES DAILY
Qty: 20 TABLET | Refills: 0 | Status: SHIPPED | OUTPATIENT
Start: 2021-03-11 | End: 2021-03-21

## 2021-03-11 RX ORDER — METOLAZONE 5 MG/1
TABLET ORAL
Qty: 90 TABLET | Refills: 0 | Status: SHIPPED | OUTPATIENT
Start: 2021-03-11 | End: 2021-05-22

## 2021-03-11 RX ORDER — METOLAZONE 5 MG/1
5 TABLET ORAL DAILY
Qty: 30 TABLET | Refills: 0 | Status: SHIPPED | OUTPATIENT
Start: 2021-03-11 | End: 2021-03-11

## 2021-03-11 RX ORDER — NORTRIPTYLINE HYDROCHLORIDE 25 MG/1
25 CAPSULE ORAL NIGHTLY
Qty: 30 CAPSULE | Refills: 0 | Status: SHIPPED | OUTPATIENT
Start: 2021-03-11 | End: 2021-04-16

## 2021-03-11 NOTE — TELEPHONE ENCOUNTER
----- Message from Apollo Abarca DO sent at 3/11/2021  1:08 PM CST -----  Venous duplex negative.  [facility]/staff please contact patient and let him know he does not have a blood clot and should contact Dr. Linnea Murphy as he might have cellulitis in the right l

## 2021-03-11 NOTE — H&P
History and Physical    C/C: neck, arm, leg pain    HPI: This is a 69-year-old male with a past medical history of cervical radiculopathy status post multilevel laminectomy, diabetes for over 10 years who presents with bilateral neck, arm, anterior thigh p bilaterally  Sensation intact to light touch C5-T1 dermatomes  He is able to perform sit to stand without pushing off with his arms 5 times in the chair today    Right shin, modestly in the right calf appears erythematous and indurated, warm to touch    Im longitudinal ligament in the midline contributing to ventral thecal sac effacement with slight progression.  Wide decompressive   laminectomy changes.    C6-C7: No significant disc bulge or herniation.  Left-sided asymmetric disc osteophyte encroachment   C

## 2021-03-11 NOTE — TELEPHONE ENCOUNTER
Left a detailed message that ultrasound was negative for DVT. That he need to call dr. Crista Lord office for a follow up. Notified that Dr. Minh Sweeney have contacted Dr. Angy Post with an update. If any question for patient to call the office.

## 2021-03-11 NOTE — PATIENT INSTRUCTIONS
If Doctor has ordered an injection or MRI and if you do not hear from us within 3 business days please call the office or send a message through [address] and ask if the injection/MRI has been approved

## 2021-03-11 NOTE — PROGRESS NOTES
Venous duplex negative. [facility]/staff please contact patient and let him know he does not have a blood clot and should contact Dr. Sherie Dunaway as he might have cellulitis in the right leg. Dr. Sherie Dunaway, this is just an FYI for you.

## 2021-03-11 NOTE — PATIENT INSTRUCTIONS
Bactrim DS has been prescribed to be taken twice daily for 10 days. Increase water intake. Additionally when possible keep right leg up when sitting idle. Continue with diuresis and add metolazone as directed. negative

## 2021-03-12 NOTE — PROGRESS NOTES
HPI/Subjective:   Patient ID: Nicolette Elias is a 63year old male.     This patient is a 58-year-old -American male who is well-known to our clinic with hypertension and diabetes and a survivor of prostate CA who on Monday was using a buffer as he w MG Oral Tab TAKE 1 TABLET BY MOUTH EVERY DAY 90 tablet 2   • MONTELUKAST SODIUM 10 MG Oral Tab TAKE 1 TABLET(10 MG) BY MOUTH EVERY NIGHT 90 tablet 3   • tamsulosin (FLOMAX) cap Take 1 capsule (0.4 mg total) by mouth daily.  90 capsule 3   • Vitamin C 500 MG Rhythm: Normal rate and regular rhythm. Heart sounds: Murmur present. No gallop. Pulmonary:      Effort: Pulmonary effort is normal.      Breath sounds: Normal breath sounds. Musculoskeletal:      Right lower le+ Pitting Edema present.       L

## 2021-03-15 DIAGNOSIS — H40.9 GLAUCOMA, UNSPECIFIED GLAUCOMA TYPE, UNSPECIFIED LATERALITY: ICD-10-CM

## 2021-03-15 RX ORDER — LATANOPROST 50 UG/ML
SOLUTION/ DROPS OPHTHALMIC
Qty: 10 ML | Refills: 2 | Status: SHIPPED | OUTPATIENT
Start: 2021-03-15 | End: 2021-12-20

## 2021-03-17 DIAGNOSIS — Z23 NEED FOR VACCINATION: ICD-10-CM

## 2021-03-26 RX ORDER — INSULIN NPH HUM/REG INSULIN HM 70-30/ML
VIAL (ML) SUBCUTANEOUS
Qty: 30 ML | Refills: 0 | Status: SHIPPED | OUTPATIENT
Start: 2021-03-26 | End: 2021-06-21

## 2021-03-28 DIAGNOSIS — I10 HYPERTENSION, UNSPECIFIED TYPE: ICD-10-CM

## 2021-03-29 RX ORDER — CARVEDILOL 12.5 MG/1
TABLET ORAL
Qty: 180 TABLET | Refills: 0 | Status: SHIPPED | OUTPATIENT
Start: 2021-03-29 | End: 2021-07-08

## 2021-04-08 DIAGNOSIS — I10 ESSENTIAL HYPERTENSION: ICD-10-CM

## 2021-04-08 DIAGNOSIS — E78.5 HYPERLIPIDEMIA, UNSPECIFIED HYPERLIPIDEMIA TYPE: ICD-10-CM

## 2021-04-08 RX ORDER — FUROSEMIDE 40 MG/1
TABLET ORAL
Qty: 180 TABLET | Refills: 0 | Status: SHIPPED | OUTPATIENT
Start: 2021-04-08 | End: 2021-07-11

## 2021-04-08 RX ORDER — ATORVASTATIN CALCIUM 40 MG/1
TABLET, FILM COATED ORAL
Qty: 90 TABLET | Refills: 1 | Status: SHIPPED | OUTPATIENT
Start: 2021-04-08 | End: 2021-10-05

## 2021-04-08 RX ORDER — AMITRIPTYLINE HYDROCHLORIDE 25 MG/1
TABLET, FILM COATED ORAL
Qty: 90 TABLET | Refills: 1 | Status: SHIPPED | OUTPATIENT
Start: 2021-04-08 | End: 2021-04-16

## 2021-04-08 RX ORDER — ATORVASTATIN CALCIUM 40 MG/1
TABLET, FILM COATED ORAL
Qty: 90 TABLET | Refills: 1 | Status: SHIPPED | OUTPATIENT
Start: 2021-04-08 | End: 2021-10-05 | Stop reason: SDUPTHER

## 2021-04-15 DIAGNOSIS — M54.12 CERVICAL RADICULITIS: ICD-10-CM

## 2021-04-15 NOTE — TELEPHONE ENCOUNTER
Medication request: Nortriptyline 25 mg oral capsule by mouth at nighttime. #30.  No refills    LOV:3/11/21  NOV:None    [place]:3/11/21

## 2021-04-16 RX ORDER — NORTRIPTYLINE HYDROCHLORIDE 25 MG/1
CAPSULE ORAL
Qty: 30 CAPSULE | Refills: 0 | Status: SHIPPED | OUTPATIENT
Start: 2021-04-16 | End: 2021-11-22 | Stop reason: ALTCHOICE

## 2021-04-23 ENCOUNTER — OFFICE VISIT (OUTPATIENT)
Dept: ENDOCRINOLOGY CLINIC | Facility: CLINIC | Age: 56
End: 2021-04-23
Payer: COMMERCIAL

## 2021-04-23 VITALS
DIASTOLIC BLOOD PRESSURE: 98 MMHG | WEIGHT: 288 LBS | SYSTOLIC BLOOD PRESSURE: 148 MMHG | HEIGHT: 69 IN | HEART RATE: 95 BPM | RESPIRATION RATE: 16 BRPM | BODY MASS INDEX: 42.65 KG/M2

## 2021-04-23 DIAGNOSIS — E04.1 THYROID NODULE: ICD-10-CM

## 2021-04-23 DIAGNOSIS — E66.01 MORBID OBESITY WITH BMI OF 40.0-44.9, ADULT (HCC): ICD-10-CM

## 2021-04-23 DIAGNOSIS — E11.69 TYPE 2 DIABETES MELLITUS WITH OTHER SPECIFIED COMPLICATION, UNSPECIFIED WHETHER LONG TERM INSULIN USE (HCC): Primary | ICD-10-CM

## 2021-04-23 DIAGNOSIS — E78.5 DYSLIPIDEMIA: ICD-10-CM

## 2021-04-23 PROCEDURE — 3080F DIAST BP >= 90 MM HG: CPT | Performed by: INTERNAL MEDICINE

## 2021-04-23 PROCEDURE — 3077F SYST BP >= 140 MM HG: CPT | Performed by: INTERNAL MEDICINE

## 2021-04-23 PROCEDURE — 3052F HG A1C>EQUAL 8.0%<EQUAL 9.0%: CPT | Performed by: FAMILY MEDICINE

## 2021-04-23 PROCEDURE — 83036 HEMOGLOBIN GLYCOSYLATED A1C: CPT | Performed by: INTERNAL MEDICINE

## 2021-04-23 PROCEDURE — 36416 COLLJ CAPILLARY BLOOD SPEC: CPT | Performed by: INTERNAL MEDICINE

## 2021-04-23 PROCEDURE — 99213 OFFICE O/P EST LOW 20 MIN: CPT | Performed by: INTERNAL MEDICINE

## 2021-04-23 PROCEDURE — 3008F BODY MASS INDEX DOCD: CPT | Performed by: INTERNAL MEDICINE

## 2021-04-23 PROCEDURE — 82947 ASSAY GLUCOSE BLOOD QUANT: CPT | Performed by: INTERNAL MEDICINE

## 2021-04-23 NOTE — PROGRESS NOTES
Return Office Visit     CHIEF COMPLAINT:  Patient presents with:  Diabetes: follow up and a1c check.  POC BG is 199        HISTORY OF PRESENT ILLNESS:  Anisha Burgess is a 63year old male who presents for follow up for diabetes.       DM HISTORY  Diagnosed BESYLATE 10 MG Oral Tab TAKE 1/2 TABLET BY MOUTH TWICE DAILY 180 tablet 0   • Semaglutide,0.25 or 0.5MG/DOS, (OZEMPIC, 0.25 OR 0.5 MG/DOSE,) 2 MG/1.5ML Subcutaneous Solution Pen-injector Inject 0.25 mg into the skin once a week.  4.5 mL 0   • Insulin Syring ALLERGY:    Seasonal                    PAST MEDICAL, SOCIAL AND FAMILY HISTORY:  See past medical history marked as reviewed. See past surgical history marked as reviewed. See past family history marked as reviewed.   See past social history marked a reviewed  a1c is 8.8 % ( 4/2021)    ASSESSMENT AND PLAN:    1. Type 2 DM: with hyperglycemia, but improvement in BG and a1c     Plan:  Discussed the pathogenesis, natural course of diabetes.  Patient understands the importance of glycemic control and the imp perform FNA biopsy of nodules greater than 1cm in size  -Discussed that if nodule is benign then plan to follow with yearly thyroid ultrasound     He does not have any RF for thyroid cancer.    No compressive symptoms.     Recent US reviewed  FNA ordered an

## 2021-04-24 DIAGNOSIS — Z79.4 TYPE 2 DIABETES MELLITUS WITH HYPERGLYCEMIA, WITH LONG-TERM CURRENT USE OF INSULIN (HCC): ICD-10-CM

## 2021-04-24 DIAGNOSIS — E11.65 TYPE 2 DIABETES MELLITUS WITH HYPERGLYCEMIA, WITH LONG-TERM CURRENT USE OF INSULIN (HCC): ICD-10-CM

## 2021-04-25 RX ORDER — SEMAGLUTIDE 1.34 MG/ML
0.5 INJECTION, SOLUTION SUBCUTANEOUS
Qty: 4.5 ML | Refills: 0 | Status: SHIPPED | OUTPATIENT
Start: 2021-04-25 | End: 2021-08-25

## 2021-04-27 DIAGNOSIS — H40.9 GLAUCOMA OF BOTH EYES, UNSPECIFIED GLAUCOMA TYPE: ICD-10-CM

## 2021-04-27 RX ORDER — DORZOLAMIDE HCL 20 MG/ML
SOLUTION/ DROPS OPHTHALMIC
Qty: 10 ML | Refills: 6 | Status: SHIPPED | OUTPATIENT
Start: 2021-04-27

## 2021-05-08 RX ORDER — BENAZEPRIL HYDROCHLORIDE AND HYDROCHLOROTHIAZIDE 20; 12.5 MG/1; MG/1
TABLET ORAL
Qty: 90 TABLET | Refills: 2 | Status: SHIPPED | OUTPATIENT
Start: 2021-05-08 | End: 2022-02-02

## 2021-05-12 NOTE — H&P
[address] [facility] [address] Gastroenterology                                                                                                  Clinic History and Physical     Pa hiatal hernia or Toscano's esophagus, tiny fundic gland polyp, biopsies were negative    September 2015 colonoscopy performed by Dr. Marie Lazo with IV twilight related colon cancer screening, findings: Normal colonoscopy, decreased anal sphincter tone, of death)   • Macular degeneration Neg       Social History: Social History    Tobacco Use      Smoking status: Never Smoker      Smokeless tobacco: Never Used    Vaping Use      Vaping Use: Never used    Alcohol use:  Yes      Alcohol/week: 0.0 standard dr 1/2 TABLET BY MOUTH TWICE DAILY 180 tablet 0   • Insulin Syringe 31G X 5/16\" 0.5 ML Does not apply Misc AS DIRECTED TWICE DAILY 100 each 5   • POTASSIUM CHLORIDE ER 8 MEQ Oral Tab CR TAKE 2 TABLETS BY MOUTH TWICE DAILY 360 tablet 2   • MONTELUKAST SODIUM ALLERGIC/IMMUNOLOGIC:  negative for hay fever  ENDOCRINE:  negative for cold intolerance and heat intolerance  MUSCULOSKELETAL:  negative for joint effusion/severe erythema  BEHAVIOR/PSYCH:  negative for psychotic behavior      PHYSICAL EXAM:   Blood pre purposefully. No red flag or alarm upper GI symptoms. We discussed given the association with constipation, hematochezia could be related to localized irritation or new hemorrhoids that have developed.   Hemorrhoids were not noted on prior colonoscopy how bleeding, infection, pain, as well as the risks of anesthesia and perforation all leading to prolonged hospitalization or surgical intervention. Miss rate of colonoscopy of 5-10% discussed.   It is the patient's responsibility to contact his/her insurance c

## 2021-05-14 ENCOUNTER — LAB ENCOUNTER (OUTPATIENT)
Dept: LAB | Facility: HOSPITAL | Age: 56
End: 2021-05-14
Attending: INTERNAL MEDICINE
Payer: MEDICARE

## 2021-05-14 DIAGNOSIS — Z20.822 ENCOUNTER FOR PREPROCEDURE SCREENING LABORATORY TESTING FOR COVID-19: ICD-10-CM

## 2021-05-14 DIAGNOSIS — Z01.812 ENCOUNTER FOR PREPROCEDURE SCREENING LABORATORY TESTING FOR COVID-19: ICD-10-CM

## 2021-05-17 ENCOUNTER — HOSPITAL ENCOUNTER (OUTPATIENT)
Dept: ULTRASOUND IMAGING | Facility: HOSPITAL | Age: 56
Discharge: HOME | End: 2021-05-17
Attending: INTERNAL MEDICINE
Payer: MEDICARE

## 2021-05-17 VITALS
SYSTOLIC BLOOD PRESSURE: 150 MMHG | HEART RATE: 93 BPM | HEIGHT: 69 IN | OXYGEN SATURATION: 98 % | WEIGHT: 280 LBS | DIASTOLIC BLOOD PRESSURE: 83 MMHG | RESPIRATION RATE: 16 BRPM | BODY MASS INDEX: 41.47 KG/M2

## 2021-05-17 DIAGNOSIS — E04.1 THYROID NODULE: ICD-10-CM

## 2021-05-17 PROCEDURE — 10005 FNA BX W/US GDN 1ST LES: CPT | Performed by: INTERNAL MEDICINE

## 2021-05-17 PROCEDURE — 88305 TISSUE EXAM BY PATHOLOGIST: CPT | Performed by: INTERNAL MEDICINE

## 2021-05-17 PROCEDURE — 88173 CYTOPATH EVAL FNA REPORT: CPT | Performed by: INTERNAL MEDICINE

## 2021-05-17 PROCEDURE — 88177 CYTP FNA EVAL EA ADDL: CPT | Performed by: INTERNAL MEDICINE

## 2021-05-17 PROCEDURE — 88172 CYTP DX EVAL FNA 1ST EA SITE: CPT | Performed by: INTERNAL MEDICINE

## 2021-05-17 NOTE — IMAGING NOTE
1423 Pt arrived to ultrasound room #2    1444 Scans taken by Jimbo Ayoub ultrasound  sonographer     1435 History taken and as follows:  PT HAS HAD THYROID NODULES THAT HAVE BEEN MONITORED BY MD, HAD REPEAT US AND NEED FOR BIOPSY    1440 Procedure explained qu

## 2021-05-19 ENCOUNTER — TELEPHONE (OUTPATIENT)
Dept: ENDOCRINOLOGY CLINIC | Facility: CLINIC | Age: 56
End: 2021-05-19

## 2021-05-19 NOTE — TELEPHONE ENCOUNTER
Biopsy of the right isthmic thyroid nodule is reported as benign , which means not cancerous  We will  Continue to monitor this nodule on US  Thanks

## 2021-05-21 DIAGNOSIS — R60.0 BILATERAL LEG EDEMA: ICD-10-CM

## 2021-05-22 RX ORDER — METOLAZONE 5 MG/1
TABLET ORAL
Qty: 90 TABLET | Refills: 0 | Status: SHIPPED | OUTPATIENT
Start: 2021-05-22 | End: 2021-08-16

## 2021-05-26 ENCOUNTER — TELEPHONE (OUTPATIENT)
Dept: GASTROENTEROLOGY | Facility: CLINIC | Age: 56
End: 2021-05-26

## 2021-05-26 ENCOUNTER — OFFICE VISIT (OUTPATIENT)
Dept: GASTROENTEROLOGY | Facility: CLINIC | Age: 56
End: 2021-05-26
Payer: COMMERCIAL

## 2021-05-26 VITALS
SYSTOLIC BLOOD PRESSURE: 95 MMHG | HEIGHT: 69 IN | HEART RATE: 99 BPM | DIASTOLIC BLOOD PRESSURE: 73 MMHG | BODY MASS INDEX: 41.77 KG/M2 | WEIGHT: 282 LBS

## 2021-05-26 DIAGNOSIS — K59.00 CONSTIPATION, UNSPECIFIED CONSTIPATION TYPE: ICD-10-CM

## 2021-05-26 DIAGNOSIS — K92.1 HEMATOCHEZIA: ICD-10-CM

## 2021-05-26 DIAGNOSIS — R10.30 LOWER ABDOMINAL PAIN: Primary | ICD-10-CM

## 2021-05-26 PROCEDURE — 3008F BODY MASS INDEX DOCD: CPT | Performed by: NURSE PRACTITIONER

## 2021-05-26 PROCEDURE — 3074F SYST BP LT 130 MM HG: CPT | Performed by: NURSE PRACTITIONER

## 2021-05-26 PROCEDURE — 3078F DIAST BP <80 MM HG: CPT | Performed by: NURSE PRACTITIONER

## 2021-05-26 PROCEDURE — 99213 OFFICE O/P EST LOW 20 MIN: CPT | Performed by: NURSE PRACTITIONER

## 2021-05-26 RX ORDER — POLYETHYLENE GLYCOL 3350, SODIUM CHLORIDE, SODIUM BICARBONATE, POTASSIUM CHLORIDE 420; 11.2; 5.72; 1.48 G/4L; G/4L; G/4L; G/4L
POWDER, FOR SOLUTION ORAL
Qty: 4000 ML | Refills: 0 | Status: SHIPPED | OUTPATIENT
Start: 2021-05-26 | End: 2021-11-16

## 2021-05-26 NOTE — PATIENT INSTRUCTIONS
-Schedule colonoscopy w/Dr. Cornel Crane w/ MAC  Dx: screening, hematochezia, lower abd pain   -Eligible for NE: No r/t BMI > 40  -Prep: Split dose Colyte/TriLyte or equivalent  -Anti-platelets and anti-coagulants: None  -Diabetes meds: Insulin/ozempic (Pl

## 2021-05-26 NOTE — TELEPHONE ENCOUNTER
Scheduled for:  Colonoscopy [id number]   Provider Name:  Dr Colleen Dacosta  Date:  09/10/2021  Location:  [facility]  Sedation:  MAC  Time:  0945 (pt is aware that [facility] will call the day before to confirm arrival time)  Prep:  Colyte  Meds/Allergies Reconciled?: Donel Lipoma

## 2021-05-26 NOTE — TELEPHONE ENCOUNTER
Dr. Glenda Champion    Patient is scheduled for a colonoscopy with Dr. Marko Malhotra on 9/10/2021.       Please advise on all diabetic (oral & insulin) adjustment orders based on the following diet modifications prior to procedure(s):    Day before the procedure(s

## 2021-05-26 NOTE — TELEPHONE ENCOUNTER
Dr. Kameron Ramos,  Patient has colonoscopy 9/10/21    Per LOV dtd 4/23/21:  insulin 70/30 15 BID  Ozempic 0.5 mg q week    Please advise on all diabetic (oral & insulin) adjustment orders based on the following diet modifications prior to procedure(s):     Sushant

## 2021-05-28 NOTE — TELEPHONE ENCOUNTER
Spoke to patient to relay message below - patient stated understanding but was not able to schedule f/u apt - stated he was in the middle of something  Patient stated he will call back to schedule

## 2021-05-28 NOTE — TELEPHONE ENCOUNTER
Patient will be due for FU in July/ August 2021  Please book apt and I can review colonoscopy instructions at that time  Thanks

## 2021-06-07 RX ORDER — BACLOFEN 10 MG/1
TABLET ORAL
Qty: 270 TABLET | Refills: 0 | Status: SHIPPED | OUTPATIENT
Start: 2021-06-07 | End: 2021-09-08

## 2021-06-21 RX ORDER — INSULIN NPH HUM/REG INSULIN HM 70-30/ML
32 VIAL (ML) SUBCUTANEOUS
Qty: 30 ML | Refills: 0 | Status: SHIPPED | OUTPATIENT
Start: 2021-06-21 | End: 2021-09-28

## 2021-07-07 DIAGNOSIS — Z91.09 ENVIRONMENTAL ALLERGIES: ICD-10-CM

## 2021-07-07 DIAGNOSIS — I10 HYPERTENSION, UNSPECIFIED TYPE: ICD-10-CM

## 2021-07-08 RX ORDER — POTASSIUM CHLORIDE 600 MG/1
TABLET, FILM COATED, EXTENDED RELEASE ORAL
Qty: 360 TABLET | Refills: 2 | Status: SHIPPED | OUTPATIENT
Start: 2021-07-08

## 2021-07-08 RX ORDER — MONTELUKAST SODIUM 10 MG/1
TABLET ORAL
Qty: 90 TABLET | Refills: 3 | Status: SHIPPED | OUTPATIENT
Start: 2021-07-08

## 2021-07-08 RX ORDER — CARVEDILOL 12.5 MG/1
TABLET ORAL
Qty: 180 TABLET | Refills: 0 | Status: SHIPPED | OUTPATIENT
Start: 2021-07-08 | End: 2021-10-05

## 2021-07-10 DIAGNOSIS — I10 ESSENTIAL HYPERTENSION: ICD-10-CM

## 2021-07-11 RX ORDER — FUROSEMIDE 40 MG/1
TABLET ORAL
Qty: 180 TABLET | Refills: 0 | Status: SHIPPED | OUTPATIENT
Start: 2021-07-11 | End: 2021-07-12

## 2021-07-12 DIAGNOSIS — I10 ESSENTIAL HYPERTENSION: ICD-10-CM

## 2021-07-12 RX ORDER — FUROSEMIDE 40 MG/1
TABLET ORAL
Qty: 180 TABLET | Refills: 0 | Status: SHIPPED | OUTPATIENT
Start: 2021-07-12

## 2021-08-15 DIAGNOSIS — R60.0 BILATERAL LEG EDEMA: ICD-10-CM

## 2021-08-16 RX ORDER — METOLAZONE 5 MG/1
5 TABLET ORAL DAILY
Qty: 90 TABLET | Refills: 1 | Status: SHIPPED | OUTPATIENT
Start: 2021-08-16

## 2021-08-16 NOTE — TELEPHONE ENCOUNTER
At increased risk for tachyarrhythmias.    Unable to fill due to interaction. Thank you. Refill passed per [facility] protocol.     Requested Prescriptions   Pending Prescriptions Disp Refills    METOLAZONE 5 MG Oral Tab [Pharmacy Med Name: METOLAZONE 5MG TABLETS] 90 tablet 0     Sig: TA

## 2021-08-17 NOTE — TELEPHONE ENCOUNTER
Alban RN's    Just following up on this message. Any updates on when patient will be seen in office for diabetic orders prior to his colonoscopy with Dr. Phi Wright on 9/10/21? Thank you.

## 2021-08-17 NOTE — TELEPHONE ENCOUNTER
PT booked apt 8/25/21 in [facility] with Dr Kasandra Raymond -pt aware he will get colonoscopy instructions at time of visit.     Routed to GI as ARMANI

## 2021-08-19 ENCOUNTER — LAB ENCOUNTER (OUTPATIENT)
Dept: LAB | Age: 56
End: 2021-08-19
Attending: FAMILY MEDICINE
Payer: MEDICARE

## 2021-08-19 ENCOUNTER — OFFICE VISIT (OUTPATIENT)
Dept: FAMILY MEDICINE CLINIC | Facility: CLINIC | Age: 56
End: 2021-08-19
Payer: COMMERCIAL

## 2021-08-19 VITALS
BODY MASS INDEX: 40.73 KG/M2 | HEART RATE: 93 BPM | RESPIRATION RATE: 17 BRPM | TEMPERATURE: 98 F | DIASTOLIC BLOOD PRESSURE: 80 MMHG | WEIGHT: 275 LBS | SYSTOLIC BLOOD PRESSURE: 110 MMHG | HEIGHT: 69 IN

## 2021-08-19 DIAGNOSIS — R07.81 PLEURODYNIA: ICD-10-CM

## 2021-08-19 DIAGNOSIS — I10 ESSENTIAL HYPERTENSION: ICD-10-CM

## 2021-08-19 DIAGNOSIS — Z13.29 THYROID DISORDER SCREEN: ICD-10-CM

## 2021-08-19 DIAGNOSIS — Z00.00 MEDICARE ANNUAL WELLNESS VISIT, INITIAL: Primary | ICD-10-CM

## 2021-08-19 DIAGNOSIS — D49.2 NEOPLASM OF SKIN OF BACK: ICD-10-CM

## 2021-08-19 DIAGNOSIS — E11.9 TYPE 2 DIABETES MELLITUS WITHOUT COMPLICATION, WITHOUT LONG-TERM CURRENT USE OF INSULIN (HCC): ICD-10-CM

## 2021-08-19 DIAGNOSIS — R52 REFERRED PAIN: ICD-10-CM

## 2021-08-19 DIAGNOSIS — E78.5 HYPERLIPIDEMIA, UNSPECIFIED HYPERLIPIDEMIA TYPE: ICD-10-CM

## 2021-08-19 DIAGNOSIS — R94.31 ABNORMAL FINDING ON EKG: Primary | ICD-10-CM

## 2021-08-19 DIAGNOSIS — Z85.46 HISTORY OF PROSTATE CANCER: ICD-10-CM

## 2021-08-19 LAB
ALBUMIN SERPL-MCNC: 3.8 G/DL (ref 3.4–5)
ALBUMIN/GLOB SERPL: 0.9 {RATIO} (ref 1–2)
ALP LIVER SERPL-CCNC: 62 U/L
ALT SERPL-CCNC: 37 U/L
ANION GAP SERPL CALC-SCNC: 4 MMOL/L (ref 0–18)
AST SERPL-CCNC: 20 U/L (ref 15–37)
BASOPHILS # BLD AUTO: 0.04 X10(3) UL (ref 0–0.2)
BASOPHILS NFR BLD AUTO: 0.5 %
BILIRUB SERPL-MCNC: 0.7 MG/DL (ref 0.1–2)
BILIRUB UR QL: NEGATIVE
BUN BLD-MCNC: 40 MG/DL (ref 7–18)
BUN/CREAT SERPL: 28.4 (ref 10–20)
CALCIUM BLD-MCNC: 9.1 MG/DL (ref 8.5–10.1)
CHLORIDE SERPL-SCNC: 101 MMOL/L (ref 98–112)
CHOLEST SMN-MCNC: 106 MG/DL (ref ?–200)
CLARITY UR: CLEAR
CO2 SERPL-SCNC: 32 MMOL/L (ref 21–32)
COLOR UR: YELLOW
CREAT BLD-MCNC: 1.41 MG/DL
DEPRECATED RDW RBC AUTO: 39.8 FL (ref 35.1–46.3)
EOSINOPHIL # BLD AUTO: 0.27 X10(3) UL (ref 0–0.7)
EOSINOPHIL NFR BLD AUTO: 3.5 %
ERYTHROCYTE [DISTWIDTH] IN BLOOD BY AUTOMATED COUNT: 12.6 % (ref 11–15)
EST. AVERAGE GLUCOSE BLD GHB EST-MCNC: 157 MG/DL (ref 68–126)
GLOBULIN PLAS-MCNC: 4.4 G/DL (ref 2.8–4.4)
GLUCOSE BLD-MCNC: 110 MG/DL (ref 70–99)
GLUCOSE UR-MCNC: NEGATIVE MG/DL
HBA1C MFR BLD HPLC: 7.1 % (ref ?–5.7)
HCT VFR BLD AUTO: 37.9 %
HDLC SERPL-MCNC: 34 MG/DL (ref 40–59)
HGB BLD-MCNC: 12.3 G/DL
HGB UR QL STRIP.AUTO: NEGATIVE
HYALINE CASTS #/AREA URNS AUTO: PRESENT /LPF
IMM GRANULOCYTES # BLD AUTO: 0.01 X10(3) UL (ref 0–1)
IMM GRANULOCYTES NFR BLD: 0.1 %
KETONES UR-MCNC: NEGATIVE MG/DL
LDLC SERPL CALC-MCNC: 48 MG/DL (ref ?–100)
LEUKOCYTE ESTERASE UR QL STRIP.AUTO: NEGATIVE
LYMPHOCYTES # BLD AUTO: 1.64 X10(3) UL (ref 1–4)
LYMPHOCYTES NFR BLD AUTO: 21.5 %
M PROTEIN MFR SERPL ELPH: 8.2 G/DL (ref 6.4–8.2)
MCH RBC QN AUTO: 28 PG (ref 26–34)
MCHC RBC AUTO-ENTMCNC: 32.5 G/DL (ref 31–37)
MCV RBC AUTO: 86.3 FL
MONOCYTES # BLD AUTO: 0.56 X10(3) UL (ref 0.1–1)
MONOCYTES NFR BLD AUTO: 7.3 %
NEUTROPHILS # BLD AUTO: 5.11 X10 (3) UL (ref 1.5–7.7)
NEUTROPHILS # BLD AUTO: 5.11 X10(3) UL (ref 1.5–7.7)
NEUTROPHILS NFR BLD AUTO: 67.1 %
NITRITE UR QL STRIP.AUTO: NEGATIVE
NONHDLC SERPL-MCNC: 72 MG/DL (ref ?–130)
OSMOLALITY SERPL CALC.SUM OF ELEC: 294 MOSM/KG (ref 275–295)
PATIENT FASTING Y/N/NP: YES
PATIENT FASTING Y/N/NP: YES
PH UR: 6 [PH] (ref 5–8)
PLATELET # BLD AUTO: 242 10(3)UL (ref 150–450)
POTASSIUM SERPL-SCNC: 3.5 MMOL/L (ref 3.5–5.1)
PROT UR-MCNC: 8.3 MG/DL
PROT UR-MCNC: NEGATIVE MG/DL
PSA SERPL-MCNC: 1.05 NG/ML (ref ?–4)
RBC # BLD AUTO: 4.39 X10(6)UL
SODIUM SERPL-SCNC: 137 MMOL/L (ref 136–145)
SP GR UR STRIP: 1.01 (ref 1–1.03)
TRIGL SERPL-MCNC: 134 MG/DL (ref 30–149)
TSI SER-ACNC: 1.55 MIU/ML (ref 0.36–3.74)
UROBILINOGEN UR STRIP-ACNC: <2
VLDLC SERPL CALC-MCNC: 19 MG/DL (ref 0–30)
WBC # BLD AUTO: 7.6 X10(3) UL (ref 4–11)

## 2021-08-19 PROCEDURE — 84156 ASSAY OF PROTEIN URINE: CPT

## 2021-08-19 PROCEDURE — 83036 HEMOGLOBIN GLYCOSYLATED A1C: CPT

## 2021-08-19 PROCEDURE — 3008F BODY MASS INDEX DOCD: CPT | Performed by: FAMILY MEDICINE

## 2021-08-19 PROCEDURE — 93000 ELECTROCARDIOGRAM COMPLETE: CPT | Performed by: FAMILY MEDICINE

## 2021-08-19 PROCEDURE — 80053 COMPREHEN METABOLIC PANEL: CPT

## 2021-08-19 PROCEDURE — 80061 LIPID PANEL: CPT

## 2021-08-19 PROCEDURE — 96160 PT-FOCUSED HLTH RISK ASSMT: CPT | Performed by: FAMILY MEDICINE

## 2021-08-19 PROCEDURE — 99396 PREV VISIT EST AGE 40-64: CPT | Performed by: FAMILY MEDICINE

## 2021-08-19 PROCEDURE — 84443 ASSAY THYROID STIM HORMONE: CPT

## 2021-08-19 PROCEDURE — 3051F HG A1C>EQUAL 7.0%<8.0%: CPT | Performed by: INTERNAL MEDICINE

## 2021-08-19 PROCEDURE — 36415 COLL VENOUS BLD VENIPUNCTURE: CPT

## 2021-08-19 PROCEDURE — 81001 URINALYSIS AUTO W/SCOPE: CPT

## 2021-08-19 PROCEDURE — 84153 ASSAY OF PSA TOTAL: CPT

## 2021-08-19 PROCEDURE — 3074F SYST BP LT 130 MM HG: CPT | Performed by: FAMILY MEDICINE

## 2021-08-19 PROCEDURE — 3079F DIAST BP 80-89 MM HG: CPT | Performed by: FAMILY MEDICINE

## 2021-08-19 PROCEDURE — 85025 COMPLETE CBC W/AUTO DIFF WBC: CPT

## 2021-08-19 PROCEDURE — G0439 PPPS, SUBSEQ VISIT: HCPCS | Performed by: FAMILY MEDICINE

## 2021-08-19 NOTE — PROGRESS NOTES
HPI/Subjective:   Patient ID: Johnie Lyles is a 63year old male.     This patient is a 58-year-old -American male well-established at our clinic treated for hypertension/diabetes and has a history for prostate CA in remission here for Medicare david Oral Cap TAKE 1 CAPSULE(25 MG) BY MOUTH EVERY NIGHT 30 capsule 0   • ATORVASTATIN 40 MG Oral Tab TAKE 1 TABLET(40 MG) BY MOUTH EVERY DAY 90 tablet 1   • Zinc 100 MG Oral Tab Take by mouth.      • Vitamin D3, Cholecalciferol, 10 MCG (400 UNIT) Oral Tab Take Strip Check 2-3 times a day. 300 each 0   • TRUEPLUS LANCETS 33G Does not apply Misc Check sugars 2-3 times a day.  100 each 3   • Glucose Blood (EQL TRUETRACK TEST) In Vitro Strip Check sugars 2-3 times a day 100 each 3     Teodoro Alcazarer's SCREENING SCHED previous visit.  Update Immunization Activity if applicable     SPECIFIC DISEASE MONITORING Internal Lab or Procedure External Lab or Procedure   Annual Monitoring of Persistent     Medications (ACE/ARB, digoxin, diuretics)    Potassium  Annually Potassium without help    Taking medications as prescribed: Able without help    Are you able to afford your medications?: Yes    Hearing Problems?: No     Functional Status     Hearing Problems?: No    Vision Problems? : Yes    Difficulty walking?: No    Difficulty Cardiovascular:      Rate and Rhythm: Normal rate and regular rhythm. Heart sounds: No gallop. Pulmonary:      Effort: Pulmonary effort is normal.      Breath sounds: Normal breath sounds.    Musculoskeletal:        Arms:       Comments: Region of d Hemoglobin A1C [E]      Protein,Total,Urine, Random [E]      Meds This Visit:  Requested Prescriptions      No prescriptions requested or ordered in this encounter       Imaging & Referrals:  SURGERY - INTERNAL  ELECTROCARDIOGRAM, COMPLETE  CT CHEST (CPT=7

## 2021-08-25 ENCOUNTER — OFFICE VISIT (OUTPATIENT)
Dept: SURGERY | Facility: CLINIC | Age: 56
End: 2021-08-25
Payer: COMMERCIAL

## 2021-08-25 ENCOUNTER — OFFICE VISIT (OUTPATIENT)
Dept: ENDOCRINOLOGY CLINIC | Facility: CLINIC | Age: 56
End: 2021-08-25
Payer: COMMERCIAL

## 2021-08-25 VITALS — BODY MASS INDEX: 39.99 KG/M2 | WEIGHT: 270 LBS | HEIGHT: 69 IN

## 2021-08-25 VITALS
WEIGHT: 275 LBS | SYSTOLIC BLOOD PRESSURE: 120 MMHG | HEART RATE: 83 BPM | DIASTOLIC BLOOD PRESSURE: 78 MMHG | BODY MASS INDEX: 40.73 KG/M2 | HEIGHT: 69 IN

## 2021-08-25 DIAGNOSIS — E11.65 TYPE 2 DIABETES MELLITUS WITH HYPERGLYCEMIA, UNSPECIFIED WHETHER LONG TERM INSULIN USE (HCC): Primary | ICD-10-CM

## 2021-08-25 DIAGNOSIS — E04.1 THYROID NODULE: ICD-10-CM

## 2021-08-25 DIAGNOSIS — E78.5 DYSLIPIDEMIA: ICD-10-CM

## 2021-08-25 DIAGNOSIS — D23.5 BENIGN NEOPLASM OF SKIN OF TRUNK: Primary | ICD-10-CM

## 2021-08-25 DIAGNOSIS — L72.3 INFECTED SEBACEOUS CYST: ICD-10-CM

## 2021-08-25 DIAGNOSIS — L08.9 INFECTED SEBACEOUS CYST: ICD-10-CM

## 2021-08-25 LAB
GLUCOSE BLOOD: 161
TEST STRIP LOT #: NORMAL NUMERIC

## 2021-08-25 PROCEDURE — 99203 OFFICE O/P NEW LOW 30 MIN: CPT | Performed by: SURGERY

## 2021-08-25 PROCEDURE — 3008F BODY MASS INDEX DOCD: CPT | Performed by: INTERNAL MEDICINE

## 2021-08-25 PROCEDURE — 3078F DIAST BP <80 MM HG: CPT | Performed by: INTERNAL MEDICINE

## 2021-08-25 PROCEDURE — 3008F BODY MASS INDEX DOCD: CPT | Performed by: SURGERY

## 2021-08-25 PROCEDURE — 82947 ASSAY GLUCOSE BLOOD QUANT: CPT | Performed by: INTERNAL MEDICINE

## 2021-08-25 PROCEDURE — 3074F SYST BP LT 130 MM HG: CPT | Performed by: INTERNAL MEDICINE

## 2021-08-25 PROCEDURE — 36416 COLLJ CAPILLARY BLOOD SPEC: CPT | Performed by: INTERNAL MEDICINE

## 2021-08-25 PROCEDURE — 99213 OFFICE O/P EST LOW 20 MIN: CPT | Performed by: INTERNAL MEDICINE

## 2021-08-25 RX ORDER — SEMAGLUTIDE 1.34 MG/ML
1 INJECTION, SOLUTION SUBCUTANEOUS WEEKLY
Qty: 9 ML | Refills: 0 | Status: SHIPPED | OUTPATIENT
Start: 2021-08-25 | End: 2021-12-07

## 2021-08-25 NOTE — PROGRESS NOTES
Return Office Visit     CHIEF COMPLAINT:  Patient presents with:  Diabetes: follow up.  POC FBG is 161       HISTORY OF PRESENT ILLNESS:  Martha Brown is a 63year old male who presents for follow up for diabetes.       DM HISTORY  Diagnosed: 2000       3350-KCl-Na Bicarb-NaCl (TRILYTE) 420 g Oral Recon Soln Take prep as directed by gastro office.  May substitute with Trilyte/generic equivalent if needed 4000 mL 0   • BENAZEPRIL-HYDROCHLOROTHIAZIDE 20-12.5 MG Oral Tab TAKE 1 TABLET BY MOUTH EVERY DAY 90 ta Glucose Blood (EQL TRUETRACK TEST) In Vitro Strip Check sugars 2-3 times a day 100 each 3   • NORTRIPTYLINE HCL 25 MG Oral Cap TAKE 1 CAPSULE(25 MG) BY MOUTH EVERY NIGHT 30 capsule 0         ALLERGY:    Other                   OTHER (SEE COMMENTS)  Joana Santamaria difficulty  Psychiatric:  oriented to time, self, and place  Extremities: no obvious extremity swelling, no lesions        DATA:     Pertinent data reviewed  a1c is 8.8 % ( 4/2021) --> 7.1 % ( 8/2021)    ASSESSMENT AND PLAN:    1. Type 2 DM:     Plan:  Disc nodules greater than 1cm in size  -Discussed that if nodule is benign then plan to follow with yearly thyroid ultrasound     He does not have any RF for thyroid cancer.    No compressive symptoms.       Recent FNA benign  Will follow on US     5. bmi > 40

## 2021-08-25 NOTE — H&P
History and Physical      HPI   Patient presents with:  Referral: Referral from Dr. Leo Carrington for two skin lesion. The first one is located on the upper Right shoulder-back area. Onset about two or three years. Patient concerned about the color.   The Semaglutide, 1 MG/DOSE, (OZEMPIC, 1 MG/DOSE,) 4 MG/3ML Subcutaneous Solution Pen-injector Inject 1 mg into the skin once a week. 9 mL 0   • metolazone 5 MG Oral Tab Take 1 tablet (5 mg total) by mouth daily.  90 tablet 1   • FUROSEMIDE 40 MG Oral Tab TAKE 2 apply Misc AS DIRECTED TWICE DAILY 100 each 5   • Vitamin C 500 MG Oral Tab Take 500 mg by mouth daily. • vitamin E 100 UNITS Oral Cap Take 100 Units by mouth daily. • Cod Liver Oil 10 MINIM Oral Cap Take by mouth.      • Kelsi 500 MG Oral Cap Take b Maternal Aunt 80        stomach (cause of death)   • Macular degeneration Neg        Review of Systems   A comprehensive 10 point review of systems was completed. Pertinent positives and negatives noted in the the HPI.     PHYSICAL EXAM   Ht 5' 9\" (1.313

## 2021-08-26 ENCOUNTER — OFFICE VISIT (OUTPATIENT)
Dept: CARDIOLOGY CLINIC | Facility: CLINIC | Age: 56
End: 2021-08-26
Payer: COMMERCIAL

## 2021-08-26 VITALS
BODY MASS INDEX: 40.73 KG/M2 | HEIGHT: 69 IN | DIASTOLIC BLOOD PRESSURE: 74 MMHG | SYSTOLIC BLOOD PRESSURE: 106 MMHG | HEART RATE: 92 BPM | RESPIRATION RATE: 18 BRPM | WEIGHT: 275 LBS

## 2021-08-26 DIAGNOSIS — R00.2 PALPITATIONS: Primary | ICD-10-CM

## 2021-08-26 PROCEDURE — 3078F DIAST BP <80 MM HG: CPT | Performed by: NURSE PRACTITIONER

## 2021-08-26 PROCEDURE — 3008F BODY MASS INDEX DOCD: CPT | Performed by: NURSE PRACTITIONER

## 2021-08-26 PROCEDURE — 99214 OFFICE O/P EST MOD 30 MIN: CPT | Performed by: NURSE PRACTITIONER

## 2021-08-26 PROCEDURE — 3074F SYST BP LT 130 MM HG: CPT | Performed by: NURSE PRACTITIONER

## 2021-08-26 RX ORDER — PYRIDOXINE HCL (VITAMIN B6) 50 MG
TABLET ORAL DAILY
COMMUNITY

## 2021-08-26 NOTE — PROGRESS NOTES
Latonya Kidd is a 63year old male. Patient presents with: Follow - Up: discuss ekg  Cardiomyopathy  Palpitations    HPI:   Patient comes in today for follow-up. He sees Dr. Delilah Blanc and myself.   He has a history of cardiomyopathy I have not seen him since 2 (70-30) 100 UNIT/ML Subcutaneous Suspension Inject 32 Units into the skin 2 (two) times daily before meals.  30 mL 0   • BACLOFEN 10 MG Oral Tab TAKE 1 TABLET(10 MG) BY MOUTH THREE TIMES DAILY 270 tablet 0   • BENAZEPRIL-HYDROCHLOROTHIAZIDE 20-12.5 MG Oral Oral Tab TAKE 1 TABLET(5 MG) BY MOUTH EVERY EVENING (Patient not taking: Reported on 8/26/2021) 90 tablet 1   • TRUE METRIX BLOOD GLUCOSE TEST In Vitro Strip USE TO CHECK 2 TO 3 TIMES A  strip 0   • Glucose Blood (TRUE METRIX BLOOD GLUCOSE TEST) In lesions  HEENT: atraumatic, normocephalic,ears and throat are clear  NECK: supple,no adenopathy,no bruits  LUNGS: clear to auscultation  CARDIO: Regular rate and rhythm  GI: good BS's,no masses, HSM or tenderness  EXTREMITIES: no cyanosis, clubbing or blane

## 2021-08-26 NOTE — TELEPHONE ENCOUNTER
Followed up on this request.    Per Dr. Park Parker notes from 8/25/21:    6. Colonoscopy : Take ozempic as scheduled  He is not on any other diabetes medication       Patient made aware of the above instructions at this OV.

## 2021-08-31 ENCOUNTER — HOSPITAL ENCOUNTER (OUTPATIENT)
Dept: CT IMAGING | Facility: HOSPITAL | Age: 56
Discharge: HOME | End: 2021-08-31
Attending: FAMILY MEDICINE
Payer: MEDICARE

## 2021-08-31 DIAGNOSIS — R07.81 PLEURODYNIA: ICD-10-CM

## 2021-08-31 DIAGNOSIS — R52 REFERRED PAIN: ICD-10-CM

## 2021-08-31 PROCEDURE — 71250 CT THORAX DX C-: CPT | Performed by: FAMILY MEDICINE

## 2021-09-01 ENCOUNTER — OFFICE VISIT (OUTPATIENT)
Dept: SURGERY | Facility: CLINIC | Age: 56
End: 2021-09-01
Payer: COMMERCIAL

## 2021-09-01 VITALS — HEART RATE: 86 BPM | DIASTOLIC BLOOD PRESSURE: 78 MMHG | SYSTOLIC BLOOD PRESSURE: 116 MMHG

## 2021-09-01 DIAGNOSIS — D23.5 BENIGN NEOPLASM OF SKIN OF TRUNK: Primary | ICD-10-CM

## 2021-09-01 PROCEDURE — 3078F DIAST BP <80 MM HG: CPT | Performed by: SURGERY

## 2021-09-01 PROCEDURE — 11426 EXC H-F-NK-SP B9+MARG >4 CM: CPT | Performed by: SURGERY

## 2021-09-01 PROCEDURE — 3074F SYST BP LT 130 MM HG: CPT | Performed by: SURGERY

## 2021-09-01 PROCEDURE — 12032 INTMD RPR S/A/T/EXT 2.6-7.5: CPT | Performed by: SURGERY

## 2021-09-01 NOTE — PROCEDURES
Surgery procedure note    Timeout performed and consent signed. Patient placed in the right lateral decubitus position and the left shoulder prepped and draped with Betadine in a sterile fashion. 1% lidocaine is infiltrated.   A 2.8 cm incision is made an

## 2021-09-01 NOTE — PATIENT INSTRUCTIONS
Ice pack if needed. Okay to shower in 24 hours. Remove brown bandage in 48 hours, leave white strips for 1 week    Avoid heavy lifting or stretching of the back or shoulder for 2 weeks.     Call for any problems

## 2021-09-03 ENCOUNTER — PATIENT MESSAGE (OUTPATIENT)
Dept: FAMILY MEDICINE CLINIC | Facility: CLINIC | Age: 56
End: 2021-09-03

## 2021-09-03 DIAGNOSIS — Z85.46 PERSONAL HISTORY OF PROSTATE CANCER: ICD-10-CM

## 2021-09-03 DIAGNOSIS — R91.8 INDETERMINATE PULMONARY NODULES: ICD-10-CM

## 2021-09-03 DIAGNOSIS — R07.81 PLEURODYNIA: Primary | ICD-10-CM

## 2021-09-03 NOTE — TELEPHONE ENCOUNTER
From: Zain Peoples  To:  Sarah Beth DO  Sent: 9/3/2021 1:14 AM CDT  Subject: Test Results Question    Doc, I would definitely like to see a lung specialist. In order to prevent whatever the nodules are from turning into something more in 6 month

## 2021-09-07 ENCOUNTER — ORDER TRANSCRIPTION (OUTPATIENT)
Dept: ADMINISTRATIVE | Facility: HOSPITAL | Age: 56
End: 2021-09-07

## 2021-09-07 ENCOUNTER — LAB ENCOUNTER (OUTPATIENT)
Dept: LAB | Facility: HOSPITAL | Age: 56
End: 2021-09-07
Attending: INTERNAL MEDICINE
Payer: MEDICARE

## 2021-09-07 DIAGNOSIS — Z11.59 ENCOUNTER FOR SCREENING FOR OTHER VIRAL DISEASES: ICD-10-CM

## 2021-09-07 DIAGNOSIS — Z01.812 PRE-PROCEDURE LAB EXAM: Primary | ICD-10-CM

## 2021-09-07 DIAGNOSIS — Z01.812 PRE-PROCEDURE LAB EXAM: ICD-10-CM

## 2021-09-07 LAB — SARS-COV-2 RNA RESP QL NAA+PROBE: NOT DETECTED

## 2021-09-08 RX ORDER — AMLODIPINE BESYLATE 10 MG/1
TABLET ORAL
Qty: 180 TABLET | Refills: 0 | Status: SHIPPED | OUTPATIENT
Start: 2021-09-08 | End: 2022-01-24

## 2021-09-08 RX ORDER — BACLOFEN 10 MG/1
TABLET ORAL
Qty: 270 TABLET | Refills: 0 | Status: SHIPPED | OUTPATIENT
Start: 2021-09-08 | End: 2021-12-05

## 2021-09-09 ENCOUNTER — TELEPHONE (OUTPATIENT)
Dept: GASTROENTEROLOGY | Facility: CLINIC | Age: 56
End: 2021-09-09

## 2021-09-09 ENCOUNTER — HOSPITAL ENCOUNTER (OUTPATIENT)
Dept: CV DIAGNOSTICS | Facility: HOSPITAL | Age: 56
Discharge: HOME | End: 2021-09-09
Attending: NURSE PRACTITIONER
Payer: MEDICARE

## 2021-09-09 DIAGNOSIS — K59.00 CONSTIPATION, UNSPECIFIED CONSTIPATION TYPE: ICD-10-CM

## 2021-09-09 DIAGNOSIS — R00.2 PALPITATIONS: ICD-10-CM

## 2021-09-09 DIAGNOSIS — K92.1 HEMATOCHEZIA: ICD-10-CM

## 2021-09-09 DIAGNOSIS — R10.30 LOWER ABDOMINAL PAIN: Primary | ICD-10-CM

## 2021-09-09 PROCEDURE — 93306 TTE W/DOPPLER COMPLETE: CPT | Performed by: NURSE PRACTITIONER

## 2021-09-09 PROCEDURE — 93227 XTRNL ECG REC<48 HR R&I: CPT | Performed by: NURSE PRACTITIONER

## 2021-09-09 PROCEDURE — 93225 XTRNL ECG REC<48 HRS REC: CPT | Performed by: NURSE PRACTITIONER

## 2021-09-09 NOTE — TELEPHONE ENCOUNTER
Schedulers-    Please see the below message and assist patient in rescheduling procedure as seen below:       Friday September 10, 2021  9:45 AM  Procedure Mac with GALINA PROCEDURE  Homar. Mike Nguyen GI PROCEDURE (--) [address] [facility]  [address] [facility] [address]

## 2021-09-09 NOTE — TELEPHONE ENCOUNTER
This patient was frustrated when I received the call from the phone room, regarding why he was cancelled on 9/10/21. I informed him that since he is getting a cardiac work up we need to check with Dr. Angie Pop for a cardiac clearance.  Also, I checked his insur

## 2021-09-09 NOTE — TELEPHONE ENCOUNTER
Cancelled for:  Colonoscopy [id number]   Provider Name:  Dr Marko Malhotra  Date:  09/10/2021  Location:  [facility]  Sedation:  MAC  Time:  0945    Prep:  Colyte  Meds/Allergies Reconciled?: Yuridia/APN reviewed. Diagnosis with codes:  Lower Abdominal Pain R10.13;  Hema

## 2021-09-14 NOTE — TELEPHONE ENCOUNTER
Dr. Nicky Carpenter--    I have a few dates that this patient would like to schedule, please advise on the following dates:      11/16/21 @ [phone]  11/23/21 at     Thank you

## 2021-09-15 NOTE — TELEPHONE ENCOUNTER
GI/RN--    This patient is scheduled    Please confirm that he has cardiac clearance from Dr. Jana Campos    Also, please contact Dr. Kameron Ramos for DM orders.  Thank you    You may close this TE when done :)

## 2021-09-16 NOTE — TELEPHONE ENCOUNTER
Gareth Brown: Thank you so much! I will reach out to both Cardiology/Endocrine providers to obtain needed orders for procedure. See alternate TE (dated 10/5/21)for cardiac orders.

## 2021-09-28 RX ORDER — INSULIN NPH HUM/REG INSULIN HM 70-30/ML
VIAL (ML) SUBCUTANEOUS
Qty: 30 ML | Refills: 0 | Status: SHIPPED | OUTPATIENT
Start: 2021-09-28

## 2021-10-05 ENCOUNTER — TELEPHONE (OUTPATIENT)
Dept: GASTROENTEROLOGY | Facility: CLINIC | Age: 56
End: 2021-10-05

## 2021-10-05 DIAGNOSIS — I10 HYPERTENSION, UNSPECIFIED TYPE: ICD-10-CM

## 2021-10-05 DIAGNOSIS — E78.5 HYPERLIPIDEMIA, UNSPECIFIED HYPERLIPIDEMIA TYPE: ICD-10-CM

## 2021-10-05 RX ORDER — CARVEDILOL 12.5 MG/1
12.5 TABLET ORAL 2 TIMES DAILY WITH MEALS
Qty: 180 TABLET | Refills: 1 | Status: SHIPPED | OUTPATIENT
Start: 2021-10-05 | End: 2022-04-03

## 2021-10-05 RX ORDER — ATORVASTATIN CALCIUM 40 MG/1
TABLET, FILM COATED ORAL
Qty: 90 TABLET | Refills: 1 | Status: SHIPPED | OUTPATIENT
Start: 2021-10-05 | End: 2022-04-03

## 2021-10-05 NOTE — TELEPHONE ENCOUNTER
"[employer] message with recommendation sent to pt.          Future Appointments   Date Time Provider Mandy Patel   11/16/2021  3:30 PM [facility], PROCEDURE [facility]GIPROC None

## 2021-10-05 NOTE — TELEPHONE ENCOUNTER
Refill passed per [facility] protocol.     Requested Prescriptions   Pending Prescriptions Disp Refills    ATORVASTATIN 40 MG Oral Tab [Pharmacy Med Name: ATORVASTATIN 40MG TABLETS] 90 tablet 1     Sig: TAKE 1 TABLET(40 MG) BY MOUTH EVERY DAY        Cholesterol Medication Protocol Passed - 10/5/2021  5:50 AM        Passed - ALT in past 12 months        Passed - LDL in past 12 months        Passed - Last ALT < 80       Lab Results   Component Value Date    ALT 37 08/19/2021             Passed - Last LDL < 130     Lab Results   Component Value Date    LDL 48 08/19/2021               Passed - Appointment in past 12 or next 3 months           CARVEDILOL 12.5 MG Oral Tab [Pharmacy Med Name: CARVEDILOL 12.5MG TABLETS] 180 tablet 0     Sig: TAKE 1 TABLET BY MOUTH TWICE DAILY WITH MEALS        Hypertensive Medications Protocol Passed - 10/5/2021  5:50 AM        Passed - CMP or BMP in past 12 months        Passed - Appointment in past 6 or next 3 months        Passed - GFR  > 50     Lab Results   Component Value Date    GFRAA 64 08/19/2021                       Recent Outpatient Visits              1 month ago Benign neoplasm of skin of trunk    [facility], [address], [facility] Isra Spears MD    Office Visit    1 month ago Combined arterial insufficiency and corporo-venous occlusive erectile dysfunction    Urology - Haley Mueller MD    Office Visit    1 month ago Palpitations    [address] [facility] Cardiology ALFREDO Rdz    Office Visit    1 month ago Benign neoplasm of skin of trunk    [facility], [address], [address] [facility] Isra Spears MD    Office Visit    1 month ago Type 2 diabetes mellitus with hyperglycemia, unspecified whether long term insulin use [facility])    [facility], [address], Ramakrishna Ralph MD    Office Visit            Future Appointments         Provider Department Appt Notes    In 1 month [facility], PROCEDURE [address] GI PROCEDURE Colon @ [facility]--melvin'bruno per Dr. Lucho Guo and Aubrey Phoenix/RN

## 2021-10-05 NOTE — TELEPHONE ENCOUNTER
Dr. Dimas Leo-    Patient was rescheduled for a colonoscopy due to alternate health (cardiac) concerns. He will now be having procedure performed on 11/16/21.  Orders were obtained previously for colonoscopy, and patient was instructed to take Ozempic as sc

## 2021-10-05 NOTE — TELEPHONE ENCOUNTER
Dr. Siegel/Cardiology RN's-    Patient has been rescheduled (due to acute episodes of palpitations that occurred in August) for a colonoscopy with Dr. Marko Malhotra on 11/16/21.     GI clinic would like to confirm cardiac clearance for the patient to proceed a

## 2021-10-14 DIAGNOSIS — M10.9 ACUTE GOUT OF ELBOW, UNSPECIFIED CAUSE, UNSPECIFIED LATERALITY: Primary | ICD-10-CM

## 2021-10-14 RX ORDER — INDOMETHACIN 50 MG/1
50 CAPSULE ORAL 2 TIMES DAILY WITH MEALS
Qty: 30 CAPSULE | Refills: 0 | Status: SHIPPED | OUTPATIENT
Start: 2021-10-14 | End: 2021-11-16

## 2021-10-20 NOTE — TELEPHONE ENCOUNTER
rn called patient ,Pt aware he should take ozempic as usual, said the takes on Sunday    But patient   states he still taking humulin 70/30 15 units about 2 x per week when blood sugars go up to 200, or has large meal or sweets.   States normally blood suga

## 2021-10-20 NOTE — TELEPHONE ENCOUNTER
Yes, same as before  Please take ozempic as scheduled ( except: if he has to take ozempic on day of procedure, please take after procedure is complete and he is eating) and check Bg as discussed before  [facility]

## 2021-10-31 NOTE — TELEPHONE ENCOUNTER
Please do not take insulin on the day prior or day of colonoscopy.    Instructions for ozempic provided  I will like to review insulin us ein person: please book FU in the next 4-6 weeks  Thanks

## 2021-11-01 NOTE — TELEPHONE ENCOUNTER
Spoke to patient to relay message below - patient stated understanding to hold insulin day prior and day of colonoscopy    F/U scheduled 12/22/21

## 2021-11-05 NOTE — TELEPHONE ENCOUNTER
S/geovanny Valerio and has recent holter and echo done in September. He feels good. Looking for clearance. Reminder to f/u with [facility] in near future.

## 2021-11-10 ENCOUNTER — TELEPHONE (OUTPATIENT)
Dept: FAMILY MEDICINE CLINIC | Facility: CLINIC | Age: 56
End: 2021-11-10

## 2021-11-10 ENCOUNTER — TELEPHONE (OUTPATIENT)
Dept: ENDOCRINOLOGY CLINIC | Facility: CLINIC | Age: 56
End: 2021-11-10

## 2021-11-10 DIAGNOSIS — E11.65 TYPE 2 DIABETES MELLITUS WITH HYPERGLYCEMIA, UNSPECIFIED WHETHER LONG TERM INSULIN USE (HCC): Primary | ICD-10-CM

## 2021-11-10 NOTE — TELEPHONE ENCOUNTER
Patient is requesting a sooner appointment for a pre-op visit. Has surgery scheduled for 12/6/21. Next available is not till 12/27/21. Please call back. GEN: Seated in Stretcher, Awake, Alert, Conversant  HEENT: PERRL, normal sclera, moist oral mucosa  Neck: No cervical spine tenderness, full range of motion intact without pain or paresthesias.  Some tenderness over left paraspinal muscles.  PULM: Breath sounds bilaterally, no increased work of breathing, speaking full sentences.  CV: Normal and regular heart rate, normal S1/S2, no murmurs, rubs, or gallops  Abd: Soft and nontender, no rebound or guarding  MSK: Ecchymosis noted over right clavicle without any bony tenderness to palpation or step-ff: range of motion of the shoulder is intact.  No precordial bruising or discomfort.  No cervical, thoracic, or lumbar tenderness.   No upper extremity tenderness, left knee mildly tender over medial aspect wih overlying ecchymosis: intact range of motion, no bony tenderness.  Sensation intact in bilateral upper and lower extremitites.  Ambulates and bares weight well.  Neuro: Alert and oriented x3, clear and fluent speech, moving all extremities with good strength

## 2021-11-10 NOTE — TELEPHONE ENCOUNTER
Pt requesting a call back to discuss if appt is needed prior to surgery scheduled for 12/6/21.  Please call [phone]

## 2021-11-10 NOTE — TELEPHONE ENCOUNTER
Dr. Neelima Arias    Patient stated sugars are good, ranging from 120-180. Fasting in the 120s, and in the 180s after meals. Patient statd he will get a1c done prior to procedure, lab order placed.

## 2021-11-11 RX ORDER — AMITRIPTYLINE HYDROCHLORIDE 25 MG/1
25 TABLET, FILM COATED ORAL DAILY
COMMUNITY

## 2021-11-11 NOTE — TELEPHONE ENCOUNTER
Patient called back to the office. Patient has been scheduled for a Pre-Op on 11/22 @ 5:40pm with Dr. Farrah Franklin.

## 2021-11-13 ENCOUNTER — LAB ENCOUNTER (OUTPATIENT)
Dept: LAB | Facility: HOSPITAL | Age: 56
End: 2021-11-13
Attending: INTERNAL MEDICINE
Payer: MEDICARE

## 2021-11-13 DIAGNOSIS — Z01.818 PRE-OP TESTING: ICD-10-CM

## 2021-11-16 ENCOUNTER — ANESTHESIA EVENT (OUTPATIENT)
Dept: ENDOSCOPY | Facility: HOSPITAL | Age: 56
End: 2021-11-16
Payer: MEDICARE

## 2021-11-16 ENCOUNTER — ANESTHESIA (OUTPATIENT)
Dept: ENDOSCOPY | Facility: HOSPITAL | Age: 56
End: 2021-11-16
Payer: MEDICARE

## 2021-11-16 ENCOUNTER — HOSPITAL ENCOUNTER (OUTPATIENT)
Facility: HOSPITAL | Age: 56
Setting detail: HOSPITAL OUTPATIENT SURGERY
Discharge: HOME | End: 2021-11-16
Attending: INTERNAL MEDICINE | Admitting: INTERNAL MEDICINE
Payer: MEDICARE

## 2021-11-16 VITALS
OXYGEN SATURATION: 97 % | WEIGHT: 270 LBS | DIASTOLIC BLOOD PRESSURE: 79 MMHG | HEART RATE: 103 BPM | SYSTOLIC BLOOD PRESSURE: 117 MMHG | BODY MASS INDEX: 39.99 KG/M2 | HEIGHT: 69 IN | RESPIRATION RATE: 16 BRPM

## 2021-11-16 DIAGNOSIS — Z01.818 PRE-OP TESTING: Primary | ICD-10-CM

## 2021-11-16 DIAGNOSIS — M10.9 ACUTE GOUT OF ELBOW, UNSPECIFIED CAUSE, UNSPECIFIED LATERALITY: ICD-10-CM

## 2021-11-16 DIAGNOSIS — R10.30 LOWER ABDOMINAL PAIN: ICD-10-CM

## 2021-11-16 DIAGNOSIS — K92.1 HEMATOCHEZIA: ICD-10-CM

## 2021-11-16 DIAGNOSIS — K59.00 CONSTIPATION, UNSPECIFIED CONSTIPATION TYPE: ICD-10-CM

## 2021-11-16 PROCEDURE — 0DJD8ZZ INSPECTION OF LOWER INTESTINAL TRACT, VIA NATURAL OR ARTIFICIAL OPENING ENDOSCOPIC: ICD-10-PCS | Performed by: INTERNAL MEDICINE

## 2021-11-16 PROCEDURE — 45378 DIAGNOSTIC COLONOSCOPY: CPT | Performed by: INTERNAL MEDICINE

## 2021-11-16 RX ORDER — SODIUM CHLORIDE, SODIUM LACTATE, POTASSIUM CHLORIDE, CALCIUM CHLORIDE 600; 310; 30; 20 MG/100ML; MG/100ML; MG/100ML; MG/100ML
INJECTION, SOLUTION INTRAVENOUS CONTINUOUS
Status: DISCONTINUED | OUTPATIENT
Start: 2021-11-16 | End: 2021-11-16

## 2021-11-16 RX ORDER — NALOXONE HYDROCHLORIDE 0.4 MG/ML
80 INJECTION, SOLUTION INTRAMUSCULAR; INTRAVENOUS; SUBCUTANEOUS AS NEEDED
Status: DISCONTINUED | OUTPATIENT
Start: 2021-11-16 | End: 2021-11-16

## 2021-11-16 RX ORDER — INDOMETHACIN 50 MG/1
50 CAPSULE ORAL 2 TIMES DAILY WITH MEALS
Qty: 60 CAPSULE | Refills: 1 | Status: SHIPPED | OUTPATIENT
Start: 2021-11-16

## 2021-11-16 RX ORDER — ONDANSETRON 2 MG/ML
4 INJECTION INTRAMUSCULAR; INTRAVENOUS ONCE AS NEEDED
Status: DISCONTINUED | OUTPATIENT
Start: 2021-11-16 | End: 2021-11-16

## 2021-11-16 RX ORDER — DEXTROSE MONOHYDRATE 25 G/50ML
50 INJECTION, SOLUTION INTRAVENOUS
Status: DISCONTINUED | OUTPATIENT
Start: 2021-11-16 | End: 2021-11-16

## 2021-11-16 RX ADMIN — SODIUM CHLORIDE, SODIUM LACTATE, POTASSIUM CHLORIDE, CALCIUM CHLORIDE: 600; 310; 30; 20 INJECTION, SOLUTION INTRAVENOUS at 17:07:00

## 2021-11-16 RX ADMIN — SODIUM CHLORIDE, SODIUM LACTATE, POTASSIUM CHLORIDE, CALCIUM CHLORIDE: 600; 310; 30; 20 INJECTION, SOLUTION INTRAVENOUS at 16:38:00

## 2021-11-16 NOTE — OPERATIVE REPORT
[facility] Endoscopy Report      Date of Procedure:  11/16/21      Preoperative Diagnosis:  1. Alteration in bowel habits ((constipation)  2. Abdominal discomfort   3. Rectal bleeding      Postoperative Diagnosis:  1.   Internal hemorrhoi vascular anomalies or signs of inflammation seen. Retroflexion in the rectum and pull-through revealed small internal hemorrhoids. There were no definitive signs of radiation proctopathy. The procedure was well tolerated without immediate complication.

## 2021-11-16 NOTE — TELEPHONE ENCOUNTER
Refill passed per [address] [place] protocol. Routed to Dr. Stephanie Zamora for review to determine if refill appropriate due to type of medication.    Requested Prescriptions   Pending Prescriptions Disp Refills    INDOMETHACIN 48 MG Oral Cap [Pharmacy Med Name: [place] [facility] - Scott Thomas 2 wk post op    In 1 month Lori Lester, [address] [facility] DM f/u    In 1 month Alberto [facility] [place], [place] [address] [place] [address]

## 2021-11-16 NOTE — ANESTHESIA PREPROCEDURE EVALUATION
Anesthesia PreOp Note    HPI:     More Varela is a 61year old male who presents for preoperative consultation requested by: Erica Ramsey MD    Date of Surgery: 11/16/2021    Procedure(s):  COLONOSCOPY  Indication: Lower abdominal pain, Hematoc edema, with long-term current use of insulin ([employer] [facility] [address]         Date Noted: 08/25/2014        Past Medical History:   Diagnosis Date   • Age-related nuclear cataract of both eyes 1/14/2021   • Arthritis    • Cervical spine disease \"20011\" [PLS VERIFY]    \"ce SPRAY IN EACH NOSTRIL TWICE DAILY, Disp: 1 Bottle, Rfl: 3  indomethacin 50 MG Oral Cap, Take 1 capsule (50 mg total) by mouth 2 (two) times daily with meals. , Disp: 60 capsule, Rfl: 1, 11/15/2021  amitriptyline 25 MG Oral Tab, Take 25 mg by mouth daily. , D MG Oral Cap, TAKE 1 CAPSULE(25 MG) BY MOUTH EVERY NIGHT, Disp: 30 capsule, Rfl: 0  Zinc 100 MG Oral Tab, Take by mouth., Disp: , Rfl: , 11/9/2021  Vitamin D3, Cholecalciferol, 10 MCG (400 UNIT) Oral Tab, Take 400 Units by mouth daily. , Disp: , Rfl: , 11/9/ Maternal Aunt 80        stomach (cause of death)   • Macular degeneration Neg      Social History    Socioeconomic History      Marital status:       Spouse name: Not on file      Number of children: 2      Years of education: Not on file      High 08/19/2021    CO2 32.0 08/19/2021    BUN 40 (H) 08/19/2021    CREATSERUM 1.41 (H) 08/19/2021     (H) 08/19/2021    CA 9.1 08/19/2021     ECHO 9/9/21  Study Conclusions   1. Left ventricle:  The cavity size was normal. Wall thickness was      increase forms of anesthetic management. All of the patient's questions were answered to the best of my ability. The patient desires the anesthetic management as planned.   Florentino Thakur CRNA  11/16/2021 3:23 PM

## 2021-11-16 NOTE — ANESTHESIA POSTPROCEDURE EVALUATION
Patient: Micky Sheikh    Procedure Summary     Date: 11/16/21 Room / Location: [address] [place] [address] ENDOSCOPY 04 / [address] [place] [address] ENDOSCOPY    Anesthesia Start: 4082 Anesthesia Stop: 1067    Procedure: COLONOSCOPY (N/A ) Diagnosis:       Lower abdominal pain      Hematochezia

## 2021-11-16 NOTE — H&P
History & Physical Examination    Patient Name: Bala Forbes  MRN: [id number]  [facility]: [id number]  YOB: 1965    Diagnosis: Alteration in bowel habits (constipation), abdominal discomfort, rectal bleeding      HUMULIN 70/30 (70-30) 100 UNIT/ML CR, TAKE 2 TABLETS BY MOUTH TWICE DAILY (Patient taking differently: Take 16 mEq by mouth daily.), Disp: 360 tablet, Rfl: 2, 11/15/2021  MONTELUKAST SODIUM 10 MG Oral Tab, TAKE 1 TABLET(10 MG) BY MOUTH EVERY NIGHT, Disp: 90 tablet, Rfl: 3, 11/15/2021  tams Strip, USE TO CHECK 2 TO 3 TIMES A DAY, Disp: 300 strip, Rfl: 0  Glucose Blood (TRUE METRIX BLOOD GLUCOSE TEST) In Vitro Strip, Check 2-3 times a day., Disp: 300 each, Rfl: 0  TRUEPLUS LANCETS 33G Does not apply Misc, Check sugars 2-3 times a day., Disp: 1 Diabetes Mother    • Stroke Mother         CVA(stroke)   • Cancer Maternal Aunt 80        stomach (cause of death)   • Macular degeneration Neg      Social History    Tobacco Use      Smoking status: Never Smoker      Smokeless tobacco: Never Used    [employer]

## 2021-11-22 ENCOUNTER — LAB ENCOUNTER (OUTPATIENT)
Dept: LAB | Facility: HOSPITAL | Age: 56
End: 2021-11-22
Attending: FAMILY MEDICINE
Payer: MEDICARE

## 2021-11-22 ENCOUNTER — TELEPHONE (OUTPATIENT)
Dept: GASTROENTEROLOGY | Facility: CLINIC | Age: 56
End: 2021-11-22

## 2021-11-22 ENCOUNTER — OFFICE VISIT (OUTPATIENT)
Dept: FAMILY MEDICINE CLINIC | Facility: CLINIC | Age: 56
End: 2021-11-22
Payer: COMMERCIAL

## 2021-11-22 ENCOUNTER — HOSPITAL ENCOUNTER (OUTPATIENT)
Dept: GENERAL RADIOLOGY | Facility: HOSPITAL | Age: 56
Discharge: HOME | End: 2021-11-22
Attending: FAMILY MEDICINE
Payer: MEDICARE

## 2021-11-22 VITALS
TEMPERATURE: 97 F | HEART RATE: 89 BPM | WEIGHT: 275 LBS | RESPIRATION RATE: 17 BRPM | HEIGHT: 69 IN | SYSTOLIC BLOOD PRESSURE: 122 MMHG | DIASTOLIC BLOOD PRESSURE: 84 MMHG | BODY MASS INDEX: 40.73 KG/M2 | OXYGEN SATURATION: 99 %

## 2021-11-22 DIAGNOSIS — N52.9 IMPOTENCE: ICD-10-CM

## 2021-11-22 DIAGNOSIS — Z01.818 PRE-OP TESTING: Primary | ICD-10-CM

## 2021-11-22 DIAGNOSIS — Z01.818 PRE-OP EXAMINATION: ICD-10-CM

## 2021-11-22 DIAGNOSIS — Z01.818 PRE-OP TESTING: ICD-10-CM

## 2021-11-22 DIAGNOSIS — E11.9 TYPE 2 DIABETES MELLITUS WITHOUT COMPLICATION, WITHOUT LONG-TERM CURRENT USE OF INSULIN (HCC): ICD-10-CM

## 2021-11-22 DIAGNOSIS — E11.65 TYPE 2 DIABETES MELLITUS WITH HYPERGLYCEMIA, UNSPECIFIED WHETHER LONG TERM INSULIN USE (HCC): ICD-10-CM

## 2021-11-22 PROCEDURE — 83036 HEMOGLOBIN GLYCOSYLATED A1C: CPT

## 2021-11-22 PROCEDURE — 71046 X-RAY EXAM CHEST 2 VIEWS: CPT | Performed by: FAMILY MEDICINE

## 2021-11-22 PROCEDURE — 3008F BODY MASS INDEX DOCD: CPT | Performed by: FAMILY MEDICINE

## 2021-11-22 PROCEDURE — 93005 ELECTROCARDIOGRAM TRACING: CPT

## 2021-11-22 PROCEDURE — 81001 URINALYSIS AUTO W/SCOPE: CPT

## 2021-11-22 PROCEDURE — 3079F DIAST BP 80-89 MM HG: CPT | Performed by: FAMILY MEDICINE

## 2021-11-22 PROCEDURE — 99214 OFFICE O/P EST MOD 30 MIN: CPT | Performed by: FAMILY MEDICINE

## 2021-11-22 PROCEDURE — 3074F SYST BP LT 130 MM HG: CPT | Performed by: FAMILY MEDICINE

## 2021-11-22 PROCEDURE — 3044F HG A1C LEVEL LT 7.0%: CPT | Performed by: FAMILY MEDICINE

## 2021-11-22 PROCEDURE — 80048 BASIC METABOLIC PNL TOTAL CA: CPT

## 2021-11-22 PROCEDURE — 93010 ELECTROCARDIOGRAM REPORT: CPT | Performed by: FAMILY MEDICINE

## 2021-11-22 PROCEDURE — 36415 COLL VENOUS BLD VENIPUNCTURE: CPT

## 2021-11-22 PROCEDURE — 85025 COMPLETE CBC W/AUTO DIFF WBC: CPT

## 2021-11-22 RX ORDER — CALCIUM CITRATE/VITAMIN D3 200MG-6.25
TABLET ORAL
Qty: 300 STRIP | Refills: 0 | Status: SHIPPED | OUTPATIENT
Start: 2021-11-22 | End: 2022-11-22

## 2021-11-22 NOTE — TELEPHONE ENCOUNTER
Recall colon in 4 years per Dr. Blair Alert. Last done:11-16-21  Next CDQ:[id number]    Updated health maintenance and pt outreach.

## 2021-11-23 ENCOUNTER — TELEPHONE (OUTPATIENT)
Dept: ENDOCRINOLOGY CLINIC | Facility: CLINIC | Age: 56
End: 2021-11-23

## 2021-11-23 NOTE — PATIENT INSTRUCTIONS
Pre-op labs ordered. Medication reviewed and renewed where needed and appropriate. Comply with medications. Monitor blood pressures and record at home. Limit salt intake.   Recommend weight loss via daily exercising and consistent healthy dietary change

## 2021-11-26 NOTE — PROGRESS NOTES
Subjective:   Patient ID: Arley Favre is a 61year old male.     This patient is a 79-year-old -American gentleman who is well-established at our clinic who is treated for hypertension and diabetes and also an invasive procedure for prostate CA wh Tab TAKE 1 TABLET(10 MG) BY MOUTH EVERY NIGHT 90 tablet 3   • tamsulosin (FLOMAX) cap TAKE 1 CAPSULE(0.4 MG) BY MOUTH DAILY 90 capsule 3   • BENAZEPRIL-HYDROCHLOROTHIAZIDE 20-12.5 MG Oral Tab TAKE 1 TABLET BY MOUTH EVERY DAY 90 tablet 2   • DORZOLAMIDE HCL Normocephalic and atraumatic. Right Ear: Tympanic membrane normal.      Left Ear: Tympanic membrane normal.      Nose: Nose normal.      Mouth/Throat:      Mouth: Mucous membranes are moist.   Neck:      Thyroid: No thyroid mass or thyromegaly.    Card weight loss via daily exercising and consistent healthy dietary changes. Return in about 3 weeks (around 12/13/2021), or if symptoms worsen or fail to improve.

## 2021-12-03 ENCOUNTER — OFFICE VISIT (OUTPATIENT)
Dept: PULMONOLOGY | Facility: CLINIC | Age: 56
End: 2021-12-03
Payer: COMMERCIAL

## 2021-12-03 VITALS
WEIGHT: 270 LBS | SYSTOLIC BLOOD PRESSURE: 121 MMHG | HEART RATE: 107 BPM | OXYGEN SATURATION: 100 % | RESPIRATION RATE: 18 BRPM | BODY MASS INDEX: 39.99 KG/M2 | HEIGHT: 69 IN | DIASTOLIC BLOOD PRESSURE: 75 MMHG

## 2021-12-03 DIAGNOSIS — R91.8 LUNG NODULES: Primary | ICD-10-CM

## 2021-12-03 PROCEDURE — 3078F DIAST BP <80 MM HG: CPT | Performed by: INTERNAL MEDICINE

## 2021-12-03 PROCEDURE — 99204 OFFICE O/P NEW MOD 45 MIN: CPT | Performed by: INTERNAL MEDICINE

## 2021-12-03 PROCEDURE — 3074F SYST BP LT 130 MM HG: CPT | Performed by: INTERNAL MEDICINE

## 2021-12-03 PROCEDURE — 3008F BODY MASS INDEX DOCD: CPT | Performed by: INTERNAL MEDICINE

## 2021-12-03 NOTE — H&P
Referring Physician  Iona Clark DO    Chief Complaint  Lung nodules    History of Present Illness  Patient presents today for pulmonary evaluation of underlying lung nodules.   Admits to some recent history of anterior chest discomfort which is re cervical fusion   • COLONOSCOPY N/A 11/16/2021    Procedure: COLONOSCOPY;  Surgeon: Erica Ramsey MD;  Location: [address] [place] [address] ENDOSCOPY   • HC ARTHROCENTESIS OR INJECT INTERMED JOINT W/O US Bilateral     carpal tunnel injections bilaterally.   Dioni Kim Take by mouth daily. , Disp: , Rfl:   Semaglutide, 1 MG/DOSE, (OZEMPIC, 1 MG/DOSE,) 4 MG/3ML Subcutaneous Solution Pen-injector, Inject 1 mg into the skin once a week., Disp: 9 mL, Rfl: 0  metolazone 5 MG Oral Tab, Take 1 tablet (5 mg total) by mouth daily. eyes 2 (two) times daily. , Disp: 10 mL, Rfl: 2  TRUEPLUS LANCETS 33G Does not apply Misc, Check sugars 2-3 times a day., Disp: 100 each, Rfl: 3  FLUTICASONE PROPIONATE 50 MCG/ACT Nasal Suspension, SHAKE LIQUID AND USE 1 SPRAY IN EACH NOSTRIL TWICE DAILY, D

## 2021-12-05 RX ORDER — BACLOFEN 10 MG/1
TABLET ORAL
Qty: 270 TABLET | Refills: 0 | Status: SHIPPED | OUTPATIENT
Start: 2021-12-05

## 2021-12-06 ENCOUNTER — NURSE TRIAGE (OUTPATIENT)
Dept: FAMILY MEDICINE CLINIC | Facility: CLINIC | Age: 56
End: 2021-12-06

## 2021-12-06 ENCOUNTER — TELEMEDICINE (OUTPATIENT)
Dept: FAMILY MEDICINE CLINIC | Facility: CLINIC | Age: 56
End: 2021-12-06

## 2021-12-06 DIAGNOSIS — U07.1 COVID-19: Primary | ICD-10-CM

## 2021-12-06 PROCEDURE — 99213 OFFICE O/P EST LOW 20 MIN: CPT | Performed by: FAMILY MEDICINE

## 2021-12-06 NOTE — TELEPHONE ENCOUNTER
Action Requested: Summary for Provider     []  Critical Lab, Recommendations Needed  [] Need Additional Advice  []   FYI    []   Need Orders  [] Need Medications Sent to Pharmacy  []  Other     SUMMARY:  Pt's daughter visited one week ago and found out pos with clear nasal drainage. No other symptoms.  Pt stated feels good.)  Are these symptoms new, recurrent, or chronic?: new  Precipitated by:  (Exposed to covid from daughter one week ago who visited pt.)  Aggravated by: no aggravating factors  Alleviated by

## 2021-12-07 RX ORDER — SEMAGLUTIDE 1.34 MG/ML
1 INJECTION, SOLUTION SUBCUTANEOUS WEEKLY
Qty: 9 ML | Refills: 0 | OUTPATIENT
Start: 2021-12-07

## 2021-12-07 RX ORDER — SEMAGLUTIDE 1.34 MG/ML
INJECTION, SOLUTION SUBCUTANEOUS
Qty: 9 ML | Refills: 0 | Status: SHIPPED | OUTPATIENT
Start: 2021-12-07

## 2021-12-07 NOTE — PROGRESS NOTES
HPI:    Rosio Hernandez is a 61year old male presents for video visit for follow-up. On 12/3 patient tested positive for COVID-19. He was asymptomatic, was tested prior to a surgical procedure.   Had mild symptoms of congestion and a sinus headache whic • Diabetes Father    • Cancer Father         bladder   • Glaucoma Father    • Diabetes Mother    • Stroke Mother         CVA(stroke)   • Cancer Maternal Aunt 80        stomach (cause of death)   • Macular degeneration Neg       Social History: Social His mouth daily.) 360 tablet 2   • MONTELUKAST SODIUM 10 MG Oral Tab TAKE 1 TABLET(10 MG) BY MOUTH EVERY NIGHT 90 tablet 3   • tamsulosin (FLOMAX) cap TAKE 1 CAPSULE(0.4 MG) BY MOUTH DAILY 90 capsule 3   • BENAZEPRIL-HYDROCHLOROTHIAZIDE 20-12.5 MG Oral Tab RONNIE distress. Pulmonary:      Comments: Breathing appears nonlabored  Neurological:      General: No focal deficit present. Mental Status: He is alert.    Psychiatric:         Mood and Affect: Mood normal.         ASSESSMENT/PLAN:   (U07.1) COVID-19  (kd

## 2021-12-08 NOTE — TELEPHONE ENCOUNTER
No home monitoring order. Patient did not receive infusion. Dr. Christi Arthur discussed red flags to watch for of when to go to ER and discussed calling office with any questions.        ASSESSMENT/PLAN:   (U07.1) COVID-19  (primary encounter diagnosis)  Plan:

## 2021-12-20 DIAGNOSIS — H40.9 GLAUCOMA, UNSPECIFIED GLAUCOMA TYPE, UNSPECIFIED LATERALITY: ICD-10-CM

## 2021-12-20 RX ORDER — LATANOPROST 50 UG/ML
SOLUTION/ DROPS OPHTHALMIC
Qty: 10 ML | Refills: 2 | Status: SHIPPED | OUTPATIENT
Start: 2021-12-20

## 2021-12-29 ENCOUNTER — NURSE TRIAGE (OUTPATIENT)
Dept: FAMILY MEDICINE CLINIC | Facility: CLINIC | Age: 56
End: 2021-12-29

## 2021-12-29 NOTE — TELEPHONE ENCOUNTER
Action Requested: Summary for Provider     []  Critical Lab, Recommendations Needed  [] Need Additional Advice  []   FYI    []   Need Orders  [] Need Medications Sent to Pharmacy  []  Other     SUMMARY: Office visit , no appts at [place] [address].

## 2021-12-30 ENCOUNTER — OFFICE VISIT (OUTPATIENT)
Dept: FAMILY MEDICINE CLINIC | Facility: CLINIC | Age: 56
End: 2021-12-30
Payer: COMMERCIAL

## 2021-12-30 VITALS
RESPIRATION RATE: 18 BRPM | BODY MASS INDEX: 39.1 KG/M2 | HEART RATE: 91 BPM | HEIGHT: 69 IN | SYSTOLIC BLOOD PRESSURE: 134 MMHG | WEIGHT: 264 LBS | DIASTOLIC BLOOD PRESSURE: 84 MMHG | TEMPERATURE: 97 F

## 2021-12-30 DIAGNOSIS — Z86.16 HISTORY OF COVID-19: Primary | ICD-10-CM

## 2021-12-30 DIAGNOSIS — R42 VERTIGO: ICD-10-CM

## 2021-12-30 DIAGNOSIS — J20.8 ACUTE BRONCHITIS DUE TO COVID-19 VIRUS: Primary | ICD-10-CM

## 2021-12-30 DIAGNOSIS — U07.1 ACUTE BRONCHITIS DUE TO COVID-19 VIRUS: Primary | ICD-10-CM

## 2021-12-30 PROCEDURE — 3079F DIAST BP 80-89 MM HG: CPT | Performed by: FAMILY MEDICINE

## 2021-12-30 PROCEDURE — 99213 OFFICE O/P EST LOW 20 MIN: CPT | Performed by: FAMILY MEDICINE

## 2021-12-30 PROCEDURE — 3075F SYST BP GE 130 - 139MM HG: CPT | Performed by: FAMILY MEDICINE

## 2021-12-30 PROCEDURE — 3008F BODY MASS INDEX DOCD: CPT | Performed by: FAMILY MEDICINE

## 2021-12-30 RX ORDER — AZITHROMYCIN 250 MG/1
TABLET, FILM COATED ORAL
Qty: 6 TABLET | Refills: 0 | Status: SHIPPED | OUTPATIENT
Start: 2021-12-30 | End: 2022-01-04

## 2021-12-30 RX ORDER — DEXTROMETHORPHAN HYDROBROMIDE AND PROMETHAZINE HYDROCHLORIDE 15; 6.25 MG/5ML; MG/5ML
5 SYRUP ORAL 4 TIMES DAILY PRN
Qty: 240 ML | Refills: 0 | Status: SHIPPED | OUTPATIENT
Start: 2021-12-30

## 2021-12-30 NOTE — PROGRESS NOTES
Subjective:   Patient ID: Latonya Kidd is a 61year old male. Pt presents with hx of COVID earlier in the month. Was not hospitalized. Pt had mild symptoms- loss taste and smell. Pt had some diarrhea and was dehydrated.  Did have an episode where he (Patient taking differently: Inject 15 Units into the skin as needed.) 30 mL 0   • AMLODIPINE 10 MG Oral Tab TAKE 1/2 TABLET BY MOUTH TWICE DAILY 180 tablet 0   • Cyanocobalamin (B-12) 500 MCG Oral Tab Take by mouth daily.      • metolazone 5 MG Oral Tab Ta Allergies:  Seasonal                    Objective:   Physical Exam  Constitutional:       Appearance: Normal appearance. Cardiovascular:      Rate and Rhythm: Normal rate and regular rhythm. Heart sounds: Normal heart sounds.    Pulmonary:      E

## 2022-01-04 ENCOUNTER — MED REC SCAN ONLY (OUTPATIENT)
Dept: FAMILY MEDICINE CLINIC | Facility: CLINIC | Age: 57
End: 2022-01-04

## 2022-01-24 RX ORDER — AMLODIPINE BESYLATE 10 MG/1
5 TABLET ORAL 2 TIMES DAILY
Qty: 90 TABLET | Refills: 1 | Status: SHIPPED | OUTPATIENT
Start: 2022-01-24 | End: 2022-08-30

## 2022-01-24 NOTE — TELEPHONE ENCOUNTER
Refill passed per [facility] protocol.     Requested Prescriptions   Pending Prescriptions Disp Refills    AMLODIPINE 10 MG Oral Tab [Pharmacy Med Name: AMLODIPINE BESYLATE 10MG TABLETS] 180 tablet 0     Sig: TAKE 1/2 TABLET BY MOUTH TWICE DAILY        Hypertensive Medications Protocol Passed - 1/24/2022  3:37 PM        Passed - CMP or BMP in past 12 months        Passed - Appointment in past 6 or next 3 months        Passed - GFR  > 50     Lab Results   Component Value Date    GFRAA 86 11/22/2021                     Future Appointments         Provider Department Appt Notes    In 1 week Evangelina Manuel PA-C Urology - [place], [place] 4 wk IPP teaching    In 1 month JOANN Shaver The nodules on my lungs          Recent Outpatient Visits              6 days ago Combined arterial insufficiency and corporo-venous occlusive erectile dysfunction    Urology - Abril Ley MD    Office Visit    1 week ago Prostate cancer [facility])    Urology - Abril Ley MD    Office Visit    2 weeks ago Combined arterial insufficiency and corporo-venous occlusive erectile dysfunction    Urology - [place], [place]    Nurse Only    3 weeks ago History of COVID-19    Nati Solis MD    Office Visit    1 month ago [facility], Jose Carlos Rivera MD    Telemedicine

## 2022-02-02 RX ORDER — BENAZEPRIL HYDROCHLORIDE AND HYDROCHLOROTHIAZIDE 20; 12.5 MG/1; MG/1
1 TABLET ORAL DAILY
Qty: 90 TABLET | Refills: 1 | Status: SHIPPED | OUTPATIENT
Start: 2022-02-02 | End: 2022-09-15

## 2022-02-02 NOTE — TELEPHONE ENCOUNTER
Refill passed per [facility] protocol.     Requested Prescriptions   Pending Prescriptions Disp Refills    BENAZEPRIL-HYDROCHLOROTHIAZIDE 20-12.5 MG Oral Tab [Pharmacy Med Name: BENAZEPRIL/HCTZ 20/12.5MG TABLETS] 90 tablet 2     Sig: TAKE 1 TABLET BY MOUTH EVERY DAY        Hypertensive Medications Protocol Passed - 2/2/2022  5:49 AM        Passed - CMP or BMP in past 12 months        Passed - Appointment in past 6 or next 3 months        Passed - GFR  > 50     Lab Results   Component Value Date    GFRAA 86 11/22/2021                       Recent Outpatient Visits              2 weeks ago Combined arterial insufficiency and corporo-venous occlusive erectile dysfunction    Urology - Haley Mueller MD    Office Visit    3 weeks ago Prostate cancer [facility])    Urology - Haley Mueller MD    Office Visit    3 weeks ago Combined arterial insufficiency and corporo-venous occlusive erectile dysfunction    Urology - Nichol[place], [place]    Nurse Only    1 month ago History of COVID-19    Kady Lopez MD    Office Visit    1 month ago COVID-19    [employer], [employer], [address], [place] Tribeginger Gould MD    Telemedicine            Future Appointments         Provider Department Appt Notes    In 2 days Rosita Dixon PA-C Urology - [place], [place] 4 wk IPP teaching    In 4 weeks PF CT [address] [facility] The nodules on my lungs

## 2022-02-15 RX ORDER — METOLAZONE 5 MG/1
5 TABLET ORAL DAILY
Qty: 90 TABLET | Refills: 1 | Status: SHIPPED | OUTPATIENT
Start: 2022-02-15

## 2022-02-15 NOTE — TELEPHONE ENCOUNTER
Refill passed per [employer], [facility] Protocol    Requested Prescriptions   Pending Prescriptions Disp Refills    METOLAZONE 5 MG Oral Tab [Pharmacy Med Name: METOLAZONE 5MG TABLETS] 90 tablet 1     Sig: TAKE 1 TABLET BY MOUTH DAILY        Hypertensive Medications Protocol Passed - 2/14/2022 11:14 AM        Passed - CMP or BMP in past 12 months        Passed - Appointment in past 6 or next 3 months        Passed - GFR  > 50     Lab Results   Component Value Date    GFRAA 86 11/22/2021                       Recent Outpatient Visits              1 week ago Combined arterial insufficiency and corporo-venous occlusive erectile dysfunction    Urology - [facility], [place], [place]    Office Visit    4 weeks ago Combined arterial insufficiency and corporo-venous occlusive erectile dysfunction    Urology - Marie Belcher MD    Office Visit    1 month ago Prostate cancer [facility])    Urology - Marie Belcher MD    Office Visit    1 month ago Combined arterial insufficiency and corporo-venous occlusive erectile dysfunction    [facility] [place]    Nurse Only    1 month ago History of COVID-19    Jorje Salgado MD    Office Visit            Future Appointments         Provider Department Appt Notes    In 3 days Columba Pena PA-C Urology [facility], [address] follow up    In 2 weeks JOANN Shaver The nodules on my lungs

## 2022-02-24 RX ORDER — INDOMETHACIN 50 MG/1
50 CAPSULE ORAL 2 TIMES DAILY WITH MEALS
Qty: 60 CAPSULE | Refills: 1 | Status: SHIPPED | OUTPATIENT
Start: 2022-02-24

## 2022-02-24 NOTE — TELEPHONE ENCOUNTER
Requested Prescriptions   Pending Prescriptions Disp Refills    INDOMETHACIN 50 MG Oral Cap [Pharmacy Med Name: INDOMETHACIN 50MG CAPSULES] 60 capsule 1     Sig: TAKE 1 CAPSULE(50 MG) BY MOUTH TWICE DAILY WITH MEALS        Non-Narcotic Pain Medication Protocol Passed - 2/24/2022  3:45 AM        Passed - Appointment in past 6 or next 3 months              Recent Outpatient Visits              6 days ago Combined arterial insufficiency and corporo-venous occlusive erectile dysfunction    Urology - [place], [place]    Office Visit    2 weeks ago Combined arterial insufficiency and corporo-venous occlusive erectile dysfunction    [facility] [place], [place]    Office Visit    1 month ago Combined arterial insufficiency and corporo-venous occlusive erectile dysfunction    Urology - Sophie Rea MD    Office Visit    1 month ago Prostate cancer [facility])    Urology - Sophie Rea MD    Office Visit    1 month ago Combined arterial insufficiency and corporo-venous occlusive erectile dysfunction    [facility], [address]    Nurse Only            Future Appointments         Provider Department Appt Notes    In 1 week JOANN Shaver The nodules on my lungs    In 1 month Mehul, [address], [facility] Lung nodules

## 2022-02-26 ENCOUNTER — TELEPHONE (OUTPATIENT)
Dept: ENDOCRINOLOGY CLINIC | Facility: CLINIC | Age: 57
End: 2022-02-26

## 2022-02-27 RX ORDER — SEMAGLUTIDE 1.34 MG/ML
INJECTION, SOLUTION SUBCUTANEOUS
Qty: 9 ML | Refills: 0 | Status: SHIPPED | OUTPATIENT
Start: 2022-02-27

## 2022-02-28 ENCOUNTER — TELEPHONE (OUTPATIENT)
Dept: FAMILY MEDICINE CLINIC | Facility: CLINIC | Age: 57
End: 2022-02-28

## 2022-02-28 NOTE — TELEPHONE ENCOUNTER
----- Message from Ina Almendarez RN sent at 2/28/2022 12:28 PM CST -----  Regarding: FW: X-Ray for chest.       ----- Message -----  From: Hilario Padilla  Sent: 2/28/2022  10:12 AM CST  To: [facility] Rn Triage  Subject: X-Ray for chest.                                 Is it possible to resend the message to Dr. Jennifer Carmona,  because I have yet to hear from him.

## 2022-02-28 NOTE — TELEPHONE ENCOUNTER
Called patient, confirmed name and . Attempting to call patient to clarify his [employer] message that appears to be requesting orders for a Chest xray and CT head. Patient is having CT of chest 3/3 ordered by Dr. Lee Lockhart. Future Appointments   Date Time Provider Mandy Patel   3/2/2022  9:30 AM Alyssa Ramirez MD [facility] THE [facility]   3/3/2022  2:30 PM PF CT RM1 PF CT [place]   2022  1:50 PM DO PAUL [facility]   2022 11:20 AM Casey  [facility] [place]     Call was disconnected. Called back and call went to voicemail. Left message for patient to call back. MyChart message sent.

## 2022-03-03 ENCOUNTER — HOSPITAL ENCOUNTER (OUTPATIENT)
Dept: CT IMAGING | Facility: HOSPITAL | Age: 57
Discharge: HOME | End: 2022-03-03
Attending: INTERNAL MEDICINE
Payer: MEDICARE

## 2022-03-03 DIAGNOSIS — R91.8 LUNG NODULES: ICD-10-CM

## 2022-03-03 PROCEDURE — 71250 CT THORAX DX C-: CPT | Performed by: INTERNAL MEDICINE

## 2022-03-24 ENCOUNTER — TELEPHONE (OUTPATIENT)
Dept: FAMILY MEDICINE CLINIC | Facility: CLINIC | Age: 57
End: 2022-03-24

## 2022-03-24 ENCOUNTER — LAB ENCOUNTER (OUTPATIENT)
Dept: LAB | Age: 57
End: 2022-03-24
Attending: FAMILY MEDICINE
Payer: MEDICARE

## 2022-03-24 DIAGNOSIS — Z79.4 CONTROLLED TYPE 2 DIABETES MELLITUS WITH RIGHT EYE AFFECTED BY MILD NONPROLIFERATIVE RETINOPATHY WITHOUT MACULAR EDEMA, WITH LONG-TERM CURRENT USE OF INSULIN (HCC): ICD-10-CM

## 2022-03-24 DIAGNOSIS — E11.3291 CONTROLLED TYPE 2 DIABETES MELLITUS WITH RIGHT EYE AFFECTED BY MILD NONPROLIFERATIVE RETINOPATHY WITHOUT MACULAR EDEMA, WITH LONG-TERM CURRENT USE OF INSULIN (HCC): ICD-10-CM

## 2022-03-24 LAB
EST. AVERAGE GLUCOSE BLD GHB EST-MCNC: 146 MG/DL (ref 68–126)
HBA1C MFR BLD: 6.7 % (ref ?–5.7)

## 2022-03-24 PROCEDURE — 3044F HG A1C LEVEL LT 7.0%: CPT | Performed by: INTERNAL MEDICINE

## 2022-03-24 PROCEDURE — 83036 HEMOGLOBIN GLYCOSYLATED A1C: CPT

## 2022-03-24 PROCEDURE — 36415 COLL VENOUS BLD VENIPUNCTURE: CPT

## 2022-03-25 ENCOUNTER — TELEPHONE (OUTPATIENT)
Dept: FAMILY MEDICINE CLINIC | Facility: CLINIC | Age: 57
End: 2022-03-25

## 2022-03-29 ENCOUNTER — TELEPHONE (OUTPATIENT)
Dept: ENDOCRINOLOGY CLINIC | Facility: CLINIC | Age: 57
End: 2022-03-29

## 2022-03-29 ENCOUNTER — OFFICE VISIT (OUTPATIENT)
Dept: ENDOCRINOLOGY CLINIC | Facility: CLINIC | Age: 57
End: 2022-03-29
Payer: COMMERCIAL

## 2022-03-29 VITALS
HEART RATE: 90 BPM | WEIGHT: 265.38 LBS | DIASTOLIC BLOOD PRESSURE: 87 MMHG | BODY MASS INDEX: 39 KG/M2 | SYSTOLIC BLOOD PRESSURE: 134 MMHG

## 2022-03-29 DIAGNOSIS — E04.1 THYROID NODULE: ICD-10-CM

## 2022-03-29 DIAGNOSIS — E78.5 DYSLIPIDEMIA: ICD-10-CM

## 2022-03-29 DIAGNOSIS — E11.65 TYPE 2 DIABETES MELLITUS WITH HYPERGLYCEMIA, UNSPECIFIED WHETHER LONG TERM INSULIN USE (HCC): Primary | ICD-10-CM

## 2022-03-29 LAB
GLUCOSE BLOOD: 116
TEST STRIP LOT #: NORMAL NUMERIC

## 2022-03-29 PROCEDURE — 82947 ASSAY GLUCOSE BLOOD QUANT: CPT | Performed by: INTERNAL MEDICINE

## 2022-03-29 PROCEDURE — 99214 OFFICE O/P EST MOD 30 MIN: CPT | Performed by: INTERNAL MEDICINE

## 2022-03-29 PROCEDURE — 3079F DIAST BP 80-89 MM HG: CPT | Performed by: INTERNAL MEDICINE

## 2022-03-29 PROCEDURE — 36416 COLLJ CAPILLARY BLOOD SPEC: CPT | Performed by: INTERNAL MEDICINE

## 2022-03-29 PROCEDURE — 3075F SYST BP GE 130 - 139MM HG: CPT | Performed by: INTERNAL MEDICINE

## 2022-03-29 NOTE — TELEPHONE ENCOUNTER
Patient here needs form filled out for [address]  On provider desk   Patient will be sending blood sugars for provider to sign his form.

## 2022-03-30 ENCOUNTER — TELEPHONE (OUTPATIENT)
Dept: SCHEDULING | Age: 57
End: 2022-03-30

## 2022-03-30 RX ORDER — INSULIN HUMAN 100 [IU]/ML
32 INJECTION, SUSPENSION SUBCUTANEOUS
Qty: 30 ML | Refills: 1 | Status: SHIPPED | OUTPATIENT
Start: 2022-03-30 | End: 2023-01-24

## 2022-03-30 NOTE — TELEPHONE ENCOUNTER
Refill passed per [facility] protocol.    Requested Prescriptions   Pending Prescriptions Disp Refills    HUMULIN 70/30 (70-30) 100 UNIT/ML Subcutaneous Suspension [Pharmacy Med Name: HUMULIN 70/30 INSULIN (HI-710)] 30 mL 0     Sig: INJECT 32 UNITS UNDER THE SKIN TWICE DAILY BEFORE MEALS        Diabetes Medication Protocol Passed - 3/30/2022  3:49 PM        Passed - Last A1C < 7.5 and within past 6 months     Lab Results   Component Value Date    A1C 6.7 (H) 03/24/2022               Passed - Appointment in past 6 or next 3 months        Passed - GFR  > 50     Lab Results   Component Value Date    GFRAA 86 11/22/2021                 Passed - GFR in the past 12 months            Recent Outpatient Visits              Yesterday Type 2 diabetes mellitus with hyperglycemia, unspecified whether long term insulin use [facility])    [employer] [facility] Endocrinology Miguel Gonzalez MD    Office Visit    3 weeks ago Combined arterial insufficiency and corporo-venous occlusive erectile dysfunction    Urology - [address] [facility] Gladys Spain MD    Office Visit    1 month ago Combined arterial insufficiency and corporo-venous occlusive erectile dysfunction    [place], [place]    Office Visit    1 month ago Combined arterial insufficiency and corporo-venous occlusive erectile dysfunction    [place], [place]    Office Visit    2 months ago Combined arterial insufficiency and corporo-venous occlusive erectile dysfunction    Urology - Hong Pham MD    Office Visit          Future Appointments         Provider Department Appt Notes    In 6 days Susan Ravi MD Urology - [address] follow up    In 1 week Pilar Zavala DO [facility], [address], [place] Pain in my upper abdomen    In 3 weeks Parag Sethi [place], [address], [place] Lung nodules    In 4 months Jim Shabazz, [facility], [address] medicare annual exam- policy informed *BA

## 2022-04-03 DIAGNOSIS — I10 HYPERTENSION, UNSPECIFIED TYPE: ICD-10-CM

## 2022-04-03 DIAGNOSIS — E78.5 HYPERLIPIDEMIA, UNSPECIFIED HYPERLIPIDEMIA TYPE: ICD-10-CM

## 2022-04-03 RX ORDER — CARVEDILOL 12.5 MG/1
TABLET ORAL
Qty: 180 TABLET | Refills: 1 | Status: SHIPPED | OUTPATIENT
Start: 2022-04-03 | End: 2022-09-30

## 2022-04-03 RX ORDER — ATORVASTATIN CALCIUM 40 MG/1
TABLET, FILM COATED ORAL
Qty: 90 TABLET | Refills: 1 | Status: SHIPPED | OUTPATIENT
Start: 2022-04-03 | End: 2022-12-17

## 2022-04-03 NOTE — TELEPHONE ENCOUNTER
Refill passed per [facility] protocol.      Requested Prescriptions   Pending Prescriptions Disp Refills    CARVEDILOL 12.5 MG Oral Tab [Pharmacy Med Name: CARVEDILOL 12.5MG TABLETS] 180 tablet 1     Sig: TAKE 1 TABLET(12.5 MG) BY MOUTH TWICE DAILY WITH MEALS        Hypertensive Medications Protocol Passed - 4/3/2022  5:50 AM        Passed - CMP or BMP in past 12 months        Passed - Appointment in past 6 or next 3 months        Passed - GFR  > 50     Lab Results   Component Value Date    GFRAA 86 11/22/2021                    ATORVASTATIN 40 MG Oral Tab [Pharmacy Med Name: ATORVASTATIN 40MG TABLETS] 90 tablet 1     Sig: TAKE 1 TABLET(40 MG) BY MOUTH EVERY DAY        Cholesterol Medication Protocol Passed - 4/3/2022  5:50 AM        Passed - ALT in past 12 months        Passed - LDL in past 12 months        Passed - Last ALT < 80       Lab Results   Component Value Date    ALT 37 08/19/2021             Passed - Last LDL < 130     Lab Results   Component Value Date    LDL 48 08/19/2021               Passed - Appointment in past 12 or next 3 months                Recent Outpatient Visits              5 days ago Type 2 diabetes mellitus with hyperglycemia, unspecified whether long term insulin use [facility])    [employer] Endocrinology Antonieta Hong MD    Office Visit    1 month ago Combined arterial insufficiency and corporo-venous occlusive erectile dysfunction    Urology - [address] [facility] Benjy Ch MD    Office Visit    1 month ago Combined arterial insufficiency and corporo-venous occlusive erectile dysfunction    Urology - [facility], [place], [place]    Office Visit    1 month ago Combined arterial insufficiency and corporo-venous occlusive erectile dysfunction    Urology - [facility], [place], [place]    Office Visit    2 months ago Combined arterial insufficiency and corporo-venous occlusive erectile dysfunction    Urology - Joey Mora MD    Office Visit             Future Appointments         Provider Department Appt Notes    In 2 days Raffy Chavez MD Urology - [place] follow up    In 1 week Js Marie DO [place], [address], [facility] Pain in my upper abdomen    In 2 weeks DO Tequila [address] Lung nodules    In 4 months Js Marie DO [place], [address], [facility] medicare annual exam- policy informed *BA

## 2022-04-12 ENCOUNTER — OFFICE VISIT (OUTPATIENT)
Dept: FAMILY MEDICINE CLINIC | Facility: CLINIC | Age: 57
End: 2022-04-12
Payer: COMMERCIAL

## 2022-04-12 VITALS
HEIGHT: 69 IN | DIASTOLIC BLOOD PRESSURE: 84 MMHG | TEMPERATURE: 98 F | HEART RATE: 79 BPM | BODY MASS INDEX: 38.21 KG/M2 | SYSTOLIC BLOOD PRESSURE: 130 MMHG | WEIGHT: 258 LBS

## 2022-04-12 DIAGNOSIS — Z85.46 PERSONAL HISTORY OF PROSTATE CANCER: ICD-10-CM

## 2022-04-12 DIAGNOSIS — I10 ESSENTIAL HYPERTENSION: ICD-10-CM

## 2022-04-12 DIAGNOSIS — M54.6 CHRONIC BILATERAL THORACIC BACK PAIN: ICD-10-CM

## 2022-04-12 DIAGNOSIS — E11.9 TYPE 2 DIABETES MELLITUS WITHOUT COMPLICATION, WITHOUT LONG-TERM CURRENT USE OF INSULIN (HCC): ICD-10-CM

## 2022-04-12 DIAGNOSIS — G89.29 CHRONIC BILATERAL THORACIC BACK PAIN: ICD-10-CM

## 2022-04-12 DIAGNOSIS — Z96.89 HISTORY OF IMPLANTATION OF PENILE PROSTHESIS: Primary | ICD-10-CM

## 2022-04-12 PROCEDURE — 3008F BODY MASS INDEX DOCD: CPT | Performed by: FAMILY MEDICINE

## 2022-04-12 PROCEDURE — 99214 OFFICE O/P EST MOD 30 MIN: CPT | Performed by: FAMILY MEDICINE

## 2022-04-12 PROCEDURE — 3079F DIAST BP 80-89 MM HG: CPT | Performed by: FAMILY MEDICINE

## 2022-04-12 PROCEDURE — 3075F SYST BP GE 130 - 139MM HG: CPT | Performed by: FAMILY MEDICINE

## 2022-04-12 NOTE — PATIENT INSTRUCTIONS
Recommend apple cider vinegar 2  Medication reviewed and renewed where needed and appropriate. Comply with medications. Monitor blood pressures and record at home. Limit salt intake. Recommend weight loss via daily exercising and consistent healthy dietary changes. Encouraged physical fitness and daily physical activity daily. Needs to follow up with urology surgeon regarding penile prosthesis. May need a second opinion.   Needs physiatry referral.

## 2022-04-26 ENCOUNTER — OFFICE VISIT (OUTPATIENT)
Dept: PHYSICAL MEDICINE AND REHAB | Facility: CLINIC | Age: 57
End: 2022-04-26
Payer: COMMERCIAL

## 2022-04-26 ENCOUNTER — TELEPHONE (OUTPATIENT)
Dept: PHYSICAL MEDICINE AND REHAB | Facility: CLINIC | Age: 57
End: 2022-04-26

## 2022-04-26 VITALS
HEIGHT: 69 IN | WEIGHT: 253 LBS | BODY MASS INDEX: 37.47 KG/M2 | SYSTOLIC BLOOD PRESSURE: 138 MMHG | DIASTOLIC BLOOD PRESSURE: 64 MMHG

## 2022-04-26 DIAGNOSIS — R29.898 LEFT HAND WEAKNESS: ICD-10-CM

## 2022-04-26 DIAGNOSIS — R20.2 NUMBNESS AND TINGLING IN BOTH HANDS: ICD-10-CM

## 2022-04-26 DIAGNOSIS — M54.2 NECK PAIN: Primary | ICD-10-CM

## 2022-04-26 DIAGNOSIS — R20.0 NUMBNESS AND TINGLING IN BOTH HANDS: ICD-10-CM

## 2022-04-26 PROCEDURE — 99213 OFFICE O/P EST LOW 20 MIN: CPT | Performed by: PHYSICAL MEDICINE & REHABILITATION

## 2022-04-26 PROCEDURE — 3078F DIAST BP <80 MM HG: CPT | Performed by: PHYSICAL MEDICINE & REHABILITATION

## 2022-04-26 PROCEDURE — 3075F SYST BP GE 130 - 139MM HG: CPT | Performed by: PHYSICAL MEDICINE & REHABILITATION

## 2022-04-26 PROCEDURE — 3008F BODY MASS INDEX DOCD: CPT | Performed by: PHYSICAL MEDICINE & REHABILITATION

## 2022-04-26 NOTE — PATIENT INSTRUCTIONS
Have the MRI of your neck done. We will contact you once I take a look at the results; depending on the findings I may ask you to do further testing (possible EMG of both arms).

## 2022-04-26 NOTE — TELEPHONE ENCOUNTER
Initiated authorization for C-Spine MRI w+wo CPT 06188 with [facility] online  Status: Approved-        Patient notified via [employer]

## 2022-04-30 ENCOUNTER — HOSPITAL ENCOUNTER (OUTPATIENT)
Dept: MRI IMAGING | Facility: HOSPITAL | Age: 57
Discharge: HOME | End: 2022-04-30
Attending: PHYSICAL MEDICINE & REHABILITATION
Payer: MEDICARE

## 2022-04-30 DIAGNOSIS — M54.2 NECK PAIN: ICD-10-CM

## 2022-04-30 DIAGNOSIS — R20.0 NUMBNESS AND TINGLING IN BOTH HANDS: ICD-10-CM

## 2022-04-30 DIAGNOSIS — R20.2 NUMBNESS AND TINGLING IN BOTH HANDS: ICD-10-CM

## 2022-04-30 DIAGNOSIS — R29.898 LEFT HAND WEAKNESS: ICD-10-CM

## 2022-04-30 LAB — CREAT BLD-MCNC: 1.5 MG/DL

## 2022-04-30 PROCEDURE — 72156 MRI NECK SPINE W/O & W/DYE: CPT | Performed by: PHYSICAL MEDICINE & REHABILITATION

## 2022-04-30 PROCEDURE — A9575 INJ GADOTERATE MEGLUMI 0.1ML: HCPCS | Performed by: PHYSICAL MEDICINE & REHABILITATION

## 2022-04-30 PROCEDURE — 82565 ASSAY OF CREATININE: CPT

## 2022-05-05 DIAGNOSIS — M10.9 ACUTE GOUT OF ELBOW, UNSPECIFIED CAUSE, UNSPECIFIED LATERALITY: ICD-10-CM

## 2022-05-05 RX ORDER — INDOMETHACIN 50 MG/1
50 CAPSULE ORAL 2 TIMES DAILY WITH MEALS
Qty: 180 CAPSULE | Refills: 1 | Status: SHIPPED | OUTPATIENT
Start: 2022-05-05 | End: 2023-04-18

## 2022-05-05 NOTE — TELEPHONE ENCOUNTER
Refill passed per [address] protocol.   Requested Prescriptions   Pending Prescriptions Disp Refills    INDOMETHACIN 50 MG Oral Cap [Pharmacy Med Name: INDOMETHACIN 50MG CAPSULES] 60 capsule 1     Sig: TAKE 1 CAPSULE(50 MG) BY MOUTH TWICE DAILY WITH MEALS        Non-Narcotic Pain Medication Protocol Passed - 5/5/2022 11:31 AM        Passed - Appointment in past 6 or next 3 months            Recent Outpatient Visits              1 week ago Neck pain    [facility]Phys[facility] Vern Cameron,     Office Visit    3 weeks ago History of implantation of penile prosthesis    [address] [facility], Seda [place],     Office Visit    1 month ago     Urology - Sparkle Cobos MD    Office Visit    1 month ago Type 2 diabetes mellitus with hyperglycemia, unspecified whether long term insulin use [facility])    [facility] [address] Endocrinology Velez Mercy, MD    Office Visit    2 months ago Combined arterial insufficiency and corporo-venous occlusive erectile dysfunction    Urology - [address] [facility] [address] Gregor Patel MD    Office Visit          Future Appointments         Provider Department Appt Notes    In 3 months Sudha Delgadillo DO [address], [facility], [address] [facility] annual exam- policy informed *HOWARD

## 2022-05-09 ENCOUNTER — TELEPHONE (OUTPATIENT)
Dept: PHYSICAL MEDICINE AND REHAB | Facility: CLINIC | Age: 57
End: 2022-05-09

## 2022-05-09 NOTE — TELEPHONE ENCOUNTER
----- Message from Ronn Anderson sent at 5/4/2022  7:09 AM CDT -----    ----- Message -----  From: Nohemy Britton DO  Sent: 5/4/2022   6:30 AM CDT  To: Xuan Green Nurse    MRI cervical spine reviewed; no significant changes since the last MRI was done. Recommend EMG of bilateral upper extremities.

## 2022-06-06 RX ORDER — SEMAGLUTIDE 1.34 MG/ML
INJECTION, SOLUTION SUBCUTANEOUS
Qty: 9 ML | Refills: 0 | Status: SHIPPED | OUTPATIENT
Start: 2022-06-06

## 2022-06-20 ENCOUNTER — PROCEDURE VISIT (OUTPATIENT)
Dept: PHYSICAL MEDICINE AND REHAB | Facility: CLINIC | Age: 57
End: 2022-06-20
Payer: COMMERCIAL

## 2022-06-20 DIAGNOSIS — R29.898 LEFT HAND WEAKNESS: ICD-10-CM

## 2022-06-20 DIAGNOSIS — M54.2 NECK PAIN: ICD-10-CM

## 2022-06-20 DIAGNOSIS — R20.0 NUMBNESS AND TINGLING IN BOTH HANDS: ICD-10-CM

## 2022-06-20 DIAGNOSIS — R20.2 NUMBNESS AND TINGLING IN BOTH HANDS: ICD-10-CM

## 2022-06-20 PROCEDURE — 95886 MUSC TEST DONE W/N TEST COMP: CPT | Performed by: PHYSICAL MEDICINE & REHABILITATION

## 2022-06-20 PROCEDURE — 95911 NRV CNDJ TEST 9-10 STUDIES: CPT | Performed by: PHYSICAL MEDICINE & REHABILITATION

## 2022-06-22 ENCOUNTER — TELEPHONE (OUTPATIENT)
Dept: PHYSICAL MEDICINE AND REHAB | Facility: CLINIC | Age: 57
End: 2022-06-22

## 2022-06-22 NOTE — TELEPHONE ENCOUNTER
Caitie See, DO  P Tita Legaspinurse  Please let the patient know he has moderate carpal tunnel syndrome, cubital tunnel syndrome (entrapment of the ulnar nerve at the elbow) in both arms, and mild findings suggestive of diabetic polyneuropathy. The hand weakness is likely more related to cubital tunnel syndrome than carpal tunnel syndrome. I would recommend he be seen by hand surgery for their opinion; Dr. Vandana Jerez.

## 2022-07-07 DIAGNOSIS — I10 ESSENTIAL HYPERTENSION: ICD-10-CM

## 2022-07-07 RX ORDER — FUROSEMIDE 40 MG/1
TABLET ORAL
Qty: 180 TABLET | Refills: 0 | Status: SHIPPED | OUTPATIENT
Start: 2022-07-07

## 2022-07-24 ENCOUNTER — TELEPHONE (OUTPATIENT)
Dept: FAMILY MEDICINE CLINIC | Facility: CLINIC | Age: 57
End: 2022-07-24

## 2022-07-24 RX ORDER — TOBRAMYCIN 3 MG/ML
1 SOLUTION/ DROPS OPHTHALMIC EVERY 4 HOURS
Qty: 5 ML | Refills: 0 | Status: SHIPPED | OUTPATIENT
Start: 2022-07-24 | End: 2022-07-31

## 2022-07-26 ENCOUNTER — TELEPHONE (OUTPATIENT)
Dept: PULMONOLOGY | Facility: CLINIC | Age: 57
End: 2022-07-26

## 2022-08-22 ENCOUNTER — LAB ENCOUNTER (OUTPATIENT)
Dept: LAB | Age: 57
End: 2022-08-22
Attending: FAMILY MEDICINE
Payer: MEDICARE

## 2022-08-22 ENCOUNTER — OFFICE VISIT (OUTPATIENT)
Dept: FAMILY MEDICINE CLINIC | Facility: CLINIC | Age: 57
End: 2022-08-22
Payer: COMMERCIAL

## 2022-08-22 VITALS
HEART RATE: 101 BPM | OXYGEN SATURATION: 99 % | HEIGHT: 69 IN | WEIGHT: 261.38 LBS | SYSTOLIC BLOOD PRESSURE: 110 MMHG | DIASTOLIC BLOOD PRESSURE: 72 MMHG | BODY MASS INDEX: 38.71 KG/M2 | TEMPERATURE: 97 F | RESPIRATION RATE: 16 BRPM

## 2022-08-22 DIAGNOSIS — E11.9 TYPE 2 DIABETES MELLITUS WITHOUT COMPLICATION, WITHOUT LONG-TERM CURRENT USE OF INSULIN (HCC): ICD-10-CM

## 2022-08-22 DIAGNOSIS — M54.2 CERVICALGIA: ICD-10-CM

## 2022-08-22 DIAGNOSIS — Z13.29 THYROID DISORDER SCREEN: ICD-10-CM

## 2022-08-22 DIAGNOSIS — I10 ESSENTIAL HYPERTENSION: ICD-10-CM

## 2022-08-22 DIAGNOSIS — E66.01 MORBID OBESITY WITH BMI OF 40.0-44.9, ADULT (HCC): ICD-10-CM

## 2022-08-22 DIAGNOSIS — Z00.00 MEDICARE ANNUAL WELLNESS VISIT, INITIAL: Primary | ICD-10-CM

## 2022-08-22 DIAGNOSIS — Z85.46 HISTORY OF PROSTATE CANCER: ICD-10-CM

## 2022-08-22 DIAGNOSIS — Z96.89 HISTORY OF IMPLANTATION OF PENILE PROSTHESIS: ICD-10-CM

## 2022-08-22 PROCEDURE — 3008F BODY MASS INDEX DOCD: CPT | Performed by: FAMILY MEDICINE

## 2022-08-22 PROCEDURE — 3074F SYST BP LT 130 MM HG: CPT | Performed by: FAMILY MEDICINE

## 2022-08-22 PROCEDURE — G0439 PPPS, SUBSEQ VISIT: HCPCS | Performed by: FAMILY MEDICINE

## 2022-08-22 PROCEDURE — 96160 PT-FOCUSED HLTH RISK ASSMT: CPT | Performed by: FAMILY MEDICINE

## 2022-08-22 PROCEDURE — 3078F DIAST BP <80 MM HG: CPT | Performed by: FAMILY MEDICINE

## 2022-08-22 PROCEDURE — 99396 PREV VISIT EST AGE 40-64: CPT | Performed by: FAMILY MEDICINE

## 2022-08-22 RX ORDER — AMITRIPTYLINE HYDROCHLORIDE 25 MG/1
25 TABLET, FILM COATED ORAL DAILY
Qty: 90 TABLET | Refills: 0 | Status: SHIPPED | OUTPATIENT
Start: 2022-08-22

## 2022-08-22 NOTE — PATIENT INSTRUCTIONS
All adult screening ordered and done appropriate for patient's age and gender and risk factors and complaints. Medication reviewed and renewed where needed and appropriate. Comply with medications. Monitor blood pressures and record at home. Limit salt intake. Recommend weight loss via daily exercising and consistent healthy dietary changes. Keep specialty appointments.

## 2022-08-26 LAB
% SATURATION: 19 % (CALC) (ref 20–48)
ABSOLUTE BASOPHILS: 36 CELLS/UL (ref 0–200)
ABSOLUTE EOSINOPHILS: 180 CELLS/UL (ref 15–500)
ABSOLUTE LYMPHOCYTES: 1224 CELLS/UL (ref 850–3900)
ABSOLUTE MONOCYTES: 612 CELLS/UL (ref 200–950)
ABSOLUTE NEUTROPHILS: 6948 CELLS/UL (ref 1500–7800)
ALBUMIN/GLOBULIN RATIO: 1.1 (CALC) (ref 1–2.5)
ALBUMIN: 3.6 G/DL (ref 3.6–5.1)
ALKALINE PHOSPHATASE: 65 U/L (ref 35–144)
ALT: 9 U/L (ref 9–46)
AST: 11 U/L (ref 10–35)
BASOPHILS: 0.4 %
BILIRUBIN, TOTAL: 0.7 MG/DL (ref 0.2–1.2)
BUN: 25 MG/DL (ref 7–25)
CALCIUM: 9.2 MG/DL (ref 8.6–10.3)
CARBON DIOXIDE: 25 MMOL/L (ref 20–32)
CHLORIDE: 104 MMOL/L (ref 98–110)
CREATININE, RANDOM URINE: 76 MG/DL (ref 20–320)
CREATININE: 1.26 MG/DL (ref 0.7–1.3)
EGFR: 67 ML/MIN/1.73M2
EOSINOPHILS: 2 %
FERRITIN: 421 NG/ML (ref 38–380)
GLOBULIN: 3.2 G/DL (CALC) (ref 1.9–3.7)
GLUCOSE: 143 MG/DL (ref 65–99)
HEMATOCRIT: 29.5 % (ref 38.5–50)
HEMOGLOBIN A1C: 7.2 % OF TOTAL HGB
HEMOGLOBIN: 9.8 G/DL (ref 13.2–17.1)
IRON BINDING CAPACITY: 237 MCG/DL (CALC) (ref 250–425)
IRON, TOTAL: 46 MCG/DL (ref 50–180)
LYMPHOCYTES: 13.6 %
MCH: 27.5 PG (ref 27–33)
MCHC: 33.2 G/DL (ref 32–36)
MCV: 82.6 FL (ref 80–100)
MONOCYTES: 6.8 %
MPV: 11.4 FL (ref 7.5–12.5)
NEUTROPHILS: 77.2 %
PLATELET COUNT: 313 THOUSAND/UL (ref 140–400)
POTASSIUM: 4.1 MMOL/L (ref 3.5–5.3)
PROTEIN, TOTAL, RANDOM UR: 16 MG/DL (ref 5–25)
PROTEIN, TOTAL: 6.8 G/DL (ref 6.1–8.1)
PROTEIN/CREATININE RATIO: 0.21 MG/MG CREAT (ref 0.02–0.13)
PROTEIN/CREATININE RATIO: 211 MG/G CREAT (ref 22–128)
RDW: 13.8 % (ref 11–15)
RED BLOOD CELL COUNT: 3.57 MILLION/UL (ref 4.2–5.8)
SODIUM: 138 MMOL/L (ref 135–146)
TSH W/REFLEX TO FT4: 1.42 MIU/L (ref 0.4–4.5)
WHITE BLOOD CELL COUNT: 9 THOUSAND/UL (ref 3.8–10.8)

## 2022-08-28 DIAGNOSIS — R60.0 BILATERAL LEG EDEMA: ICD-10-CM

## 2022-08-29 NOTE — TELEPHONE ENCOUNTER
Unable to refill due to interaction warnings and failure to pass protocol. Please advise. Requested Prescriptions   Pending Prescriptions Disp Refills    METOLAZONE 5 MG Oral Tab [Pharmacy Med Name: METOLAZONE 5MG TABLETS] 90 tablet 1     Sig: TAKE 1 TABLET(5 MG) BY MOUTH DAILY        Hypertensive Medications Protocol Passed - 8/28/2022  1:17 PM        Passed - In person appointment in the past 12 or next 3 months       Recent Outpatient Visits              1 week ago Medicare annual wellness visit, initial    [facility], [place]cy[facility] [address] [place], [place] [address]Funmilayo, DO    Office Visit    4 months ago Neck pain    [address] [facility] Dwayne Vnicent, DO    Office Visit    4 months ago History of implantation of penile prosthesis    [place] [employer], [facility]    Office Visit    4 months ago     Urology - Joey Mora MD    Office Visit    5 months ago Type 2 diabetes mellitus with hyperglycemia, unspecified whether long term insulin use [facility])    [place] [employer] [facility] [place] [facility] Halle Muhammad MD    Office Visit                 Passed - Last BP reading less than 140/90     BP Readings from Last 1 Encounters:  08/22/22 : 110/72                Passed - CMP or BMP in past 6 months     Recent Results (from the past 4392 hour(s))   COMP METABOLIC PANEL (14)    Collection Time: 08/22/22 10:56 AM   Result Value Ref Range    GLUCOSE 143 (H) 65 - 99 mg/dL     Comment:               Fasting reference interval     For someone without known diabetes, a glucose  value >125 mg/dL indicates that they may have  diabetes and this should be confirmed with a  follow-up test.         UREA NITROGEN (BUN) 25 7 - 25 mg/dL    CREATININE 1.26 0.70 - 1.30 mg/dL    EGFR 67 > OR = 60 mL/min/1.73m2     Comment: The eGFR is based on the CKD-EPI 2021 equation.  To calculate   the new eGFR from a previous Creatinine or Cystatin C  result, go to [facility].at. org/professionals/  kdoqi/gfr%5Fcalculator      BUN/CREATININE RATIO NOT APPLICABLE 6 - 22 (calc)    SODIUM 138 135 - 146 mmol/L    POTASSIUM 4.1 3.5 - 5.3 mmol/L    CHLORIDE 104 98 - 110 mmol/L    CARBON DIOXIDE 25 20 - 32 mmol/L    CALCIUM 9.2 8.6 - 10.3 mg/dL    PROTEIN, TOTAL 6.8 6.1 - 8.1 g/dL    ALBUMIN 3.6 3.6 - 5.1 g/dL    GLOBULIN 3.2 1.9 - 3.7 g/dL (calc)    ALBUMIN/GLOBULIN RATIO 1.1 1.0 - 2.5 (calc)    BILIRUBIN, TOTAL 0.7 0.2 - 1.2 mg/dL    ALKALINE PHOSPHATASE 65 35 - 144 U/L    AST 11 10 - 35 U/L    ALT 9 9 - 46 U/L     *Note: Due to a large number of results and/or encounters for the requested time period, some results have not been displayed. A complete set of results can be found in Results Review.                  Passed - In person appointment or virtual visit in the past 6 months       Recent Outpatient Visits              1 week ago Medicare annual wellness visit, initial    [address] [place] David Blakely [address]    Office Visit    4 months ago Neck pain    [address] [facility] Nohemy Britton DO    Office Visit    4 months ago History of implantation of penile prosthesis    [address] [place] [facility] [address] [place], [place] [address]Yoana DO    Office Visit    4 months ago     Urology - Citlaly Kruger MD    Office Visit    5 months ago Type 2 diabetes mellitus with hyperglycemia, unspecified whether long term insulin use [facility])    [address] [place] [address]ely Endocrinology Jacquie Fofana MD    Office Visit                 Passed - GFR > 50     No results found for: [facility]                 BACLOFEN 10 MG Oral Tab [Pharmacy Med Name: BACLOFEN 10MG TABLETS] 270 tablet 0     Sig: TAKE 1 TABLET(10 MG) BY MOUTH THREE TIMES DAILY        There is no refill protocol information for this order        AMLODIPINE 10 MG Oral Tab [Pharmacy Med Name: AMLODIPINE BESYLATE 10MG TABLETS] 90 tablet 1 Sig: TAKE 1/2 TABLET BY MOUTH TWICE DAILY        Hypertensive Medications Protocol Passed - 8/28/2022  1:17 PM        Passed - In person appointment in the past 12 or next 3 months       Recent Outpatient Visits              1 week ago Medicare annual wellness visit, initial    [facility] [place] David Blakely, [place] , [place], Lorena Warner, DO    Office Visit    4 months ago Neck pain    [address] [facility] Dustin Dexter, DO    Office Visit    4 months ago History of implantation of penile prosthesis    [facility] , [place] Hylton, DO    Office Visit    4 months ago     Urology - Carl Jaeger MD    Office Visit    5 months ago Type 2 diabetes mellitus with hyperglycemia, unspecified whether long term insulin use [facility])    [address] [place] [address] David Langd Endocrinology Marissa MD Hermes    Office Visit                 Passed - Last BP reading less than 140/90     BP Readings from Last 1 Encounters:  08/22/22 : 110/72                Passed - CMP or BMP in past 6 months     Recent Results (from the past 4392 hour(s))   COMP METABOLIC PANEL (14)    Collection Time: 08/22/22 10:56 AM   Result Value Ref Range    GLUCOSE 143 (H) 65 - 99 mg/dL     Comment:               Fasting reference interval     For someone without known diabetes, a glucose  value >125 mg/dL indicates that they may have  diabetes and this should be confirmed with a  follow-up test.         UREA NITROGEN (BUN) 25 7 - 25 mg/dL    CREATININE 1.26 0.70 - 1.30 mg/dL    EGFR 67 > OR = 60 mL/min/1.73m2     Comment: The eGFR is based on the CKD-EPI 2021 equation. To calculate   the new eGFR from a previous Creatinine or Cystatin C  result, go to [employer].at. org/professionals/  kdoqi/gfr%5Fcalculator      BUN/CREATININE RATIO NOT APPLICABLE 6 - 22 (calc)    SODIUM 138 135 - 146 mmol/L    POTASSIUM 4.1 3.5 - 5.3 mmol/L    CHLORIDE 104 98 - 110 mmol/L    CARBON DIOXIDE 25 20 - 32 mmol/L    CALCIUM 9.2 8.6 - 10.3 mg/dL    PROTEIN, TOTAL 6.8 6.1 - 8.1 g/dL    ALBUMIN 3.6 3.6 - 5.1 g/dL    GLOBULIN 3.2 1.9 - 3.7 g/dL (calc)    ALBUMIN/GLOBULIN RATIO 1.1 1.0 - 2.5 (calc)    BILIRUBIN, TOTAL 0.7 0.2 - 1.2 mg/dL    ALKALINE PHOSPHATASE 65 35 - 144 U/L    AST 11 10 - 35 U/L    ALT 9 9 - 46 U/L     *Note: Due to a large number of results and/or encounters for the requested time period, some results have not been displayed. A complete set of results can be found in Results Review.                  Passed - In person appointment or virtual visit in the past 6 months       Recent Outpatient Visits              1 week ago Medicare annual wellness visit, initial    [facility], [address], Gretchen Martell, DO    Office Visit    4 months ago Neck pain    [address] [facility] Misty Carmona, DO    Office Visit    4 months ago History of implantation of penile prosthesis    [place] [employer] [facility], [address], Gretchen Martell, DO    Office Visit    4 months ago     Urology - Sophie Rea MD    Office Visit    5 months ago Type 2 diabetes mellitus with hyperglycemia, unspecified whether long term insulin use [facility])    [facility] Endocrinology Michi Matute MD    Office Visit                 Passed - GFR > 50     No results found for: [facility]                    @[email or web address]      @[email or web address]

## 2022-08-30 RX ORDER — AMLODIPINE BESYLATE 10 MG/1
TABLET ORAL
Qty: 90 TABLET | Refills: 1 | Status: SHIPPED | OUTPATIENT
Start: 2022-08-30

## 2022-08-30 RX ORDER — METOLAZONE 5 MG/1
TABLET ORAL
Qty: 90 TABLET | Refills: 1 | Status: SHIPPED | OUTPATIENT
Start: 2022-08-30

## 2022-08-30 RX ORDER — BACLOFEN 10 MG/1
TABLET ORAL
Qty: 270 TABLET | Refills: 0 | Status: SHIPPED | OUTPATIENT
Start: 2022-08-30

## 2022-09-15 RX ORDER — BENAZEPRIL HYDROCHLORIDE AND HYDROCHLOROTHIAZIDE 20; 12.5 MG/1; MG/1
1 TABLET ORAL DAILY
Qty: 90 TABLET | Refills: 1 | Status: SHIPPED | OUTPATIENT
Start: 2022-09-15 | End: 2023-03-28

## 2022-09-15 NOTE — TELEPHONE ENCOUNTER
Refill passed per [address] [place] [address] David Blakely protocol. Requested Prescriptions   Pending Prescriptions Disp Refills    BENAZEPRIL-HYDROCHLOROTHIAZIDE 20-12.5 MG Oral Tab [Pharmacy Med Name: BENAZEPRIL/HCTZ 20/12.5MG TABLETS] 90 tablet 1     Sig: Take 1 tablet by mouth daily. Hypertensive Medications Protocol Passed - 9/15/2022 11:50 AM        Passed - In person appointment in the past 12 or next 3 months       Recent Outpatient Visits              3 weeks ago Medicare annual wellness visit, initial    [address] [place] [address] Nigel Hamilton, GlencrossFunmilayo, DO    Office Visit    4 months ago Neck pain    [address] [facility] [place] Dwayne Vincent, DO    Office Visit    5 months ago History of implantation of penile prosthesis    [address] [place] [address] Nigel Hamilton, GlencrossFunmilayo, DO    Office Visit    5 months ago     Urology - Joey Mora MD    Office Visit    5 months ago Type 2 diabetes mellitus with hyperglycemia, unspecified whether long term insulin use [facility])    [address] [place] [address] [facility] Halle Muhammad MD    Office Visit                 Passed - Last BP reading less than 140/90     BP Readings from Last 1 Encounters:  08/22/22 : 110/72                Passed - CMP or BMP in past 6 months     Recent Results (from the past 4392 hour(s))   COMP METABOLIC PANEL (14)    Collection Time: 08/22/22 10:56 AM   Result Value Ref Range    GLUCOSE 143 (H) 65 - 99 mg/dL     Comment:               Fasting reference interval     For someone without known diabetes, a glucose  value >125 mg/dL indicates that they may have  diabetes and this should be confirmed with a  follow-up test.         UREA NITROGEN (BUN) 25 7 - 25 mg/dL    CREATININE 1.26 0.70 - 1.30 mg/dL    EGFR 67 > OR = 60 mL/min/1.73m2     Comment: The eGFR is based on the CKD-EPI 2021 equation.  To calculate   the new eGFR from a previous Creatinine or Cystatin C  result, go to Heather.[email or web address]. org/professionals/  kdoqi/gfr%5Fcalculator      BUN/CREATININE RATIO NOT APPLICABLE 6 - 22 (calc)    SODIUM 138 135 - 146 mmol/L    POTASSIUM 4.1 3.5 - 5.3 mmol/L    CHLORIDE 104 98 - 110 mmol/L    CARBON DIOXIDE 25 20 - 32 mmol/L    CALCIUM 9.2 8.6 - 10.3 mg/dL    PROTEIN, TOTAL 6.8 6.1 - 8.1 g/dL    ALBUMIN 3.6 3.6 - 5.1 g/dL    GLOBULIN 3.2 1.9 - 3.7 g/dL (calc)    ALBUMIN/GLOBULIN RATIO 1.1 1.0 - 2.5 (calc)    BILIRUBIN, TOTAL 0.7 0.2 - 1.2 mg/dL    ALKALINE PHOSPHATASE 65 35 - 144 U/L    AST 11 10 - 35 U/L    ALT 9 9 - 46 U/L     *Note: Due to a large number of results and/or encounters for the requested time period, some results have not been displayed. A complete set of results can be found in Results Review.                  Passed - In person appointment or virtual visit in the past 6 months       Recent Outpatient Visits              3 weeks ago Medicare annual wellness visit, initial    [address] [place] Hamiltoncynthia [address] [place], [place] [address]Gretchen, DO    Office Visit    4 months ago Neck pain    203 [facility] Misty Carmona, DO    Office Visit    5 months ago History of implantation of penile prosthesis    [address] [place], [place] [address]Gretchen, DO    Office Visit    5 months ago     Urology - Sophie Rea MD    Office Visit    5 months ago Type 2 diabetes mellitus with hyperglycemia, unspecified whether long term insulin use [facility])    [address] [place] David Blakely Endocrinology Michi Matute MD    Office Visit                 Passed [facility] or GFRAA > 50     GFR Evaluation  GFRAA: 61 , resulted on 4/30/2022                Recent Outpatient Visits              3 weeks ago Medicare annual wellness visit, initial    [address] [place] Hamiltoncynthia [address] [place], [place] [address]Gretchen, DO    Office Visit    4 months ago Neck pain    [address] [facility] Melisa Levi Benavides, DO    Office Visit    5 months ago History of implantation of penile prosthesis    [facility] Ofelia Novak,     Office Visit    5 months ago     Urology - Jaden Rojas MD    Office Visit    5 months ago Type 2 diabetes mellitus with hyperglycemia, unspecified whether long term insulin use [facility])    [facility], [facility] Endocrinology Elvina Nissen, MD    Office Visit

## 2022-09-30 DIAGNOSIS — Z91.09 ENVIRONMENTAL ALLERGIES: ICD-10-CM

## 2022-09-30 DIAGNOSIS — I10 HYPERTENSION, UNSPECIFIED TYPE: ICD-10-CM

## 2022-09-30 RX ORDER — MONTELUKAST SODIUM 10 MG/1
10 TABLET ORAL NIGHTLY
Qty: 90 TABLET | Refills: 1 | Status: SHIPPED | OUTPATIENT
Start: 2022-09-30 | End: 2023-03-28

## 2022-09-30 RX ORDER — CARVEDILOL 12.5 MG/1
TABLET ORAL
Qty: 180 TABLET | Refills: 1 | Status: SHIPPED | OUTPATIENT
Start: 2022-09-30 | End: 2023-03-28

## 2022-09-30 NOTE — TELEPHONE ENCOUNTER
Ref[place] passed per [place] [employer] [facility] protocol. Requested Prescriptions   Pending Prescriptions Disp Refills    CARVEDILOL 12.5 MG Oral Tab [Pharmacy Med Name: CARVEDILOL 12.5MG TABLETS] 180 tablet 1     Sig: TAKE 1 TABLET BY MOUTH TWICE DAILY WITH MEALS        Hypertensive Medications Protocol Passed - 9/30/2022  1:52 PM        Passed - In person appointment in the past 12 or next 3 months       Recent Outpatient Visits              1 month ago Medicare annual wellness visit, initial    [facility] [address] [place] [address], Serena Talbert, DO    Office Visit    5 months ago Neck pain    [facility] Janki Edmonds, DO    Office Visit    5 months ago History of implantation of penile prosthesis    [facility], [facility] [address] [place] [address], Serena Talbert, DO    Office Visit    5 months ago     Urology - Calvin Man MD    Office Visit    6 months ago Type 2 diabetes mellitus with hyperglycemia, unspecified whether long term insulin use [facility])    [facility] Endocrinology Néstor Gonzalez MD    Office Visit                 Passed - Last BP reading less than 140/90     BP Readings from Last 1 Encounters:  08/22/22 : 110/72                Passed - CMP or BMP in past 6 months     Recent Results (from the past 4392 hour(s))   COMP METABOLIC PANEL (14)    Collection Time: 08/22/22 10:56 AM   Result Value Ref Range    GLUCOSE 143 (H) 65 - 99 mg/dL     Comment:               Fasting reference interval     For someone without known diabetes, a glucose  value >125 mg/dL indicates that they may have  diabetes and this should be confirmed with a  follow-up test.         UREA NITROGEN (BUN) 25 7 - 25 mg/dL    CREATININE 1.26 0.70 - 1.30 mg/dL    EGFR 67 > OR = 60 mL/min/1.73m2     Comment: The eGFR is based on the CKD-EPI 2021 equation.  To calculate   the new eGFR from a previous Creatinine or Cystatin C  result, go to [email or web address]. org/professionals/  kdoqi/gfr%[email or web address]      BUN/CREATININE RATIO NOT APPLICABLE 6 - 22 (calc)    SODIUM 138 135 - 146 mmol/L    POTASSIUM 4.1 3.5 - 5.3 mmol/L    CHLORIDE 104 98 - 110 mmol/L    CARBON DIOXIDE 25 20 - 32 mmol/L    CALCIUM 9.2 8.6 - 10.3 mg/dL    PROTEIN, TOTAL 6.8 6.1 - 8.1 g/dL    ALBUMIN 3.6 3.6 - 5.1 g/dL    GLOBULIN 3.2 1.9 - 3.7 g/dL (calc)    ALBUMIN/GLOBULIN RATIO 1.1 1.0 - 2.5 (calc)    BILIRUBIN, TOTAL 0.7 0.2 - 1.2 mg/dL    ALKALINE PHOSPHATASE 65 35 - 144 U/L    AST 11 10 - 35 U/L    ALT 9 9 - 46 U/L     *Note: Due to a large number of results and/or encounters for the requested time period, some results have not been displayed. A complete set of results can be found in Results Review.                  Passed - In person appointment or virtual visit in the past 6 months       Recent Outpatient Visits              1 month ago Medicare annual wellness visit, initial    [facility], [address] Joel, DO    Office Visit    5 months ago Neck pain    [address] [facility] Bethany, DO    Office Visit    5 months ago History of implantation of penile prosthesis    [facility], [address] Ophelia Maldonado, DO    Office Visit    5 months ago     Urology - Thelma Royal MD    Office Visit    6 months ago Type 2 diabetes mellitus with hyperglycemia, unspecified whether long term insulin use [facility])    [facility] End[facility]inology Quynh Wang MD    Office Visit                 Passed [facility] or GFRAA > 50     GFR Evaluation  GFRAA: 61 , resulted on 4/30/2022               MONTELUKAST 10 MG Oral Tab [Pharmacy Med Name: MONTELUKAST 10MG TABLETS] 90 tablet 3     Sig: TAKE 1 TABLET(10 MG) BY MOUTH EVERY NIGHT        Asthma & COPD Medication Protocol Passed - 9/30/2022  1:52 PM        Passed - In person appointment or virtual visit in the past 6 mos or appointment in next 3 mos       Recent Outpatient Visits              1 month ago Medicare annual wellness visit, initial    [facility], [facility], [address], [facility] Donald Hicks, DO    Office Visit    5 months ago Neck pain    [address] [facility], [facility] Mandi Dyson, DO    Office Visit    5 months ago History of implantation of penile prosthesis    [facility], [facility], [address], [facility] Donald Hicks, DO    Office Visit    5 months ago     Urology - Francis Melendez MD    Office Visit    6 months ago Type 2 diabetes mellitus with hyperglycemia, unspecified whether long term insulin use [facility])    [facility], [facility] Endocrinology Lilly Davis MD    Office Visit                     Recent Outpatient Visits              1 month ago Medicare annual wellness visit, initial    [facility], [facility], [address], [facility] Donald Hicks, DO    Office Visit    5 months ago Neck pain    [address] [facility], [facility] Mandi Dyson, DO    Office Visit    5 months ago History of implantation of penile prosthesis    [email or web address], [address], [facility] Donald Hicks, DO    Office Visit    5 months ago     Urology - Francis Melendez MD    Office Visit    6 months ago Type 2 diabetes mellitus with hyperglycemia, unspecified whether long term insulin use [facility])    [facility], [facility] Endocrinology Lilly Davis MD    Office Visit

## 2022-10-12 DIAGNOSIS — E11.65 TYPE 2 DIABETES MELLITUS WITH HYPERGLYCEMIA, WITH LONG-TERM CURRENT USE OF INSULIN (HCC): ICD-10-CM

## 2022-10-12 DIAGNOSIS — Z79.4 TYPE 2 DIABETES MELLITUS WITH HYPERGLYCEMIA, WITH LONG-TERM CURRENT USE OF INSULIN (HCC): ICD-10-CM

## 2022-10-12 DIAGNOSIS — E11.9 TYPE 2 DIABETES MELLITUS WITHOUT COMPLICATION, WITHOUT LONG-TERM CURRENT USE OF INSULIN (HCC): ICD-10-CM

## 2022-10-13 NOTE — TELEPHONE ENCOUNTER
Refill passed per [address] [place] protocol.  Requiring doctor approval.  Requested Prescriptions   Pending Prescriptions Disp Refills    [address] [place] Med Name: [id number] [facility]] 300 strip 0     Sig: USE AS DIRECTED THREE TIMES DAILY        Diabetic Supplies Protocol Passed - 10/12/2022  8:53 PM        Passed - In person appointment or virtual visit in the past 12 mos or appointment in next 3 mos       Recent Outpatient Visits              1 month ago Medicare annual wellness visit, initial    [address], [place], [place]    Office Visit    5 months ago Neck pain    [address] [facility], DO    Office Visit    6 months ago History of implantation of penile prosthesis    [address] [place], [address], [place], [place]    Office Visit    6 months ago     Urology - Heraclio Hunt MD    Office Visit    6 months ago Type 2 diabetes mellitus with hyperglycemia, unspecified whether long term insulin use [facility])    [facility] Rodrigo Blakely Endocrinology Jonas Wiley MD    Office Visit                     Recent Outpatient Visits              1 month ago Medicare annual wellness visit, initial    [address] [place], [address], [place], DO    Office Visit    5 months ago Neck pain    [address] [facility], DO    Office Visit    6 months ago History of implantation of penile prosthesis    [address] [place], DO    Office Visit    6 months ago     Urology - Heraclio Hunt MD    Office Visit    6 months ago Type 2 diabetes mellitus with hyperglycemia, unspecified whether long term insulin use [facility])    [facility] Rodrigo Blakely Endocrinology Jonas Wiley MD    Office Visit

## 2022-10-13 NOTE — TELEPHONE ENCOUNTER
Please call and book FU in the next one month once apt is made, please forward to me for refill. Okay to add on of needed: any time Tuesday.  [address] [facility] wed, before 1 pm other day s Thanks

## 2022-10-14 RX ORDER — BLOOD SUGAR DIAGNOSTIC
STRIP MISCELLANEOUS
Qty: 300 STRIP | Refills: 0 | Status: SHIPPED | OUTPATIENT
Start: 2022-10-14

## 2022-10-31 RX ORDER — SEMAGLUTIDE 1.34 MG/ML
INJECTION, SOLUTION SUBCUTANEOUS
Qty: 4.5 ML | Refills: 0 | OUTPATIENT
Start: 2022-10-31

## 2022-11-11 DIAGNOSIS — I10 HYPERTENSION, UNSPECIFIED TYPE: ICD-10-CM

## 2022-11-11 NOTE — TELEPHONE ENCOUNTER
Please review. No protocol.   Requested Prescriptions   Pending Prescriptions Disp Refills    POTASSIUM CHLORIDE ER 8 MEQ Oral Tab CR [Pharmacy Med Name: POTASSIUM CHLORIDE ER 8MEQ ER TABS] 360 tablet 2     Sig: TAKE 2 TABLETS BY MOUTH TWICE DAILY       There is no refill protocol information for this order

## 2022-11-13 RX ORDER — POTASSIUM CHLORIDE 600 MG/1
16 TABLET, FILM COATED, EXTENDED RELEASE ORAL 2 TIMES DAILY
Qty: 360 TABLET | Refills: 2 | Status: SHIPPED | OUTPATIENT
Start: 2022-11-13

## 2022-11-29 DIAGNOSIS — M54.2 CERVICALGIA: ICD-10-CM

## 2022-11-29 NOTE — TELEPHONE ENCOUNTER
Please review refill protocol failed/ no protocol  Requested Prescriptions   Pending Prescriptions Disp Refills    AMITRIPTYLINE 25 MG Oral Tab [Pharmacy Med Name: AMITRIPTYLINE 25MG TABLETS] 90 tablet 0     Sig: TAKE 1 TABLET(25 MG) BY MOUTH DAILY       There is no refill protocol information for this order

## 2022-11-30 RX ORDER — AMITRIPTYLINE HYDROCHLORIDE 25 MG/1
25 TABLET, FILM COATED ORAL DAILY
Qty: 90 TABLET | Refills: 1 | Status: SHIPPED | OUTPATIENT
Start: 2022-11-30

## 2022-12-16 DIAGNOSIS — E78.5 HYPERLIPIDEMIA, UNSPECIFIED HYPERLIPIDEMIA TYPE: ICD-10-CM

## 2022-12-17 RX ORDER — ATORVASTATIN CALCIUM 40 MG/1
TABLET, FILM COATED ORAL
Qty: 90 TABLET | Refills: 1 | Status: SHIPPED | OUTPATIENT
Start: 2022-12-17

## 2023-01-17 RX ORDER — BACLOFEN 10 MG/1
TABLET ORAL
Qty: 270 TABLET | Refills: 0 | Status: SHIPPED | OUTPATIENT
Start: 2023-01-17

## 2023-01-17 NOTE — TELEPHONE ENCOUNTER
Please review; Protocol Failed / No protocol.     Requested Prescriptions   Pending Prescriptions Disp Refills    BACLOFEN 10 MG Oral Tab [Pharmacy Med Name: BACLOFEN 10MG TABLETS] 270 tablet 0     Sig: TAKE 1 TABLET(10 MG) BY MOUTH THREE TIMES DAILY       There is no refill protocol information for this order           Recent Outpatient Visits              4 months ago Medicare annual wellness visit, initial    [address], [place] [address], [place], [place]    Office Visit    8 months ago Neck pain    [facility], [address], [facility],     Office Visit    9 months ago History of implantation of penile prosthesis    [facility], [facility], [facility],     Office Visit    9 months ago     Urology - Govind Royal MD    Office Visit    9 months ago Type 2 diabetes mellitus with hyperglycemia, unspecified whether long term insulin use [facility])    Lianne Miles MD    Office Visit             Future Appointments         Provider Department Appt Notes    In 2 months Reza Schroeder MD [address], [place] [address] [facility] A1c and cholesterol

## 2023-01-24 RX ORDER — INSULIN NPH HUM/REG INSULIN HM 70-30/ML
VIAL (ML) SUBCUTANEOUS
Qty: 30 ML | Refills: 2 | Status: SHIPPED | OUTPATIENT
Start: 2023-01-24

## 2023-01-24 NOTE — TELEPHONE ENCOUNTER
Refill passed per [facility], [facility] protocol. Requested Prescriptions   Pending Prescriptions Disp Refills    HUMULIN 70/30 (70-30) 100 UNIT/ML Subcutaneous Suspension [Pharmacy Med Name: HUMULIN 70/30 INSULIN (HI-710)] 30 mL 1     Sig: INJECT 32 UNITS UNDER THE SKIN TWICE DAILY BEFORE MEALS.        Diabetes Medication Protocol Passed - 1/22/2023 11:36 PM        Passed - Last A1C < 7.5 and within past 6 months     Lab Results   Component Value Date    A1C 7.2 (H) 08/22/2022             Passed - In person appointment or virtual visit in the past 6 mos or appointment in next 3 mos     Recent Outpatient Visits              5 months ago Medicare annual wellness visit, initial    [facility] President, [place], Nithin Fernandez, [place]    Office Visit    9 months ago Neck pain    [address], [address], [place] Reyes Leon, DO    Office Visit    9 months ago [facility] [address], Nithin Fernandez, DO    Office Visit    9 months ago     Urology - Jessy Royal MD    Office Visit    10 months ago Type 2 diabetes mellitus with hyperglycemia, unspecified whether long term insulin use ([facility]    Bharat President, Elaine Wallis MD    Office Visit          Future Appointments         Provider Department Appt Notes    In 1 month [facility] [place] [address] [facility] A1c and cholesterol               Passed - EGFRCR or GFRAA > 50     GFR Evaluation  GFRAA: 59 , resulted on 4/30/2022          Passed - GFR in the past 12 months

## 2023-03-15 ENCOUNTER — TELEPHONE (OUTPATIENT)
Dept: ENDOCRINOLOGY CLINIC | Facility: CLINIC | Age: 58
End: 2023-03-15

## 2023-03-15 DIAGNOSIS — Z79.4 TYPE 2 DIABETES MELLITUS WITH HYPERGLYCEMIA, WITH LONG-TERM CURRENT USE OF INSULIN (HCC): Primary | ICD-10-CM

## 2023-03-15 DIAGNOSIS — E11.65 TYPE 2 DIABETES MELLITUS WITH HYPERGLYCEMIA, WITH LONG-TERM CURRENT USE OF INSULIN (HCC): Primary | ICD-10-CM

## 2023-03-15 DIAGNOSIS — E78.5 DYSLIPIDEMIA: ICD-10-CM

## 2023-03-15 NOTE — TELEPHONE ENCOUNTER
Dr. Vernell Jarquin, asked patient to have A1C done in lab before appt 3/18/23. Did you want to order any other labs? Patient appt note states A1C and cholesterol.

## 2023-03-16 NOTE — TELEPHONE ENCOUNTER
Dr. Jim Coffey, Patience Elizabeth)  Spoke to patient to advise of lab orders - patient in rehab/snf recovering from sepsis  F/U on 3/18/23 canceled - patient advised to contact clinic to reschedule upon discharge  Thanks

## 2023-03-20 ENCOUNTER — TELEPHONE (OUTPATIENT)
Dept: FAMILY MEDICINE CLINIC | Facility: CLINIC | Age: 58
End: 2023-03-20

## 2023-03-20 NOTE — TELEPHONE ENCOUNTER
Reached patient for medication adherence consult. Patient overdue for refill on Ozempic. Patient states that he is currently in a skilled nursing facility and is receiving all of the appropriate medications. He is unsure how long he will be in the rehabilitation facility. Patient does not need any refills at this time.

## 2023-03-28 DIAGNOSIS — I10 HYPERTENSION, UNSPECIFIED TYPE: ICD-10-CM

## 2023-03-28 DIAGNOSIS — Z91.09 ENVIRONMENTAL ALLERGIES: ICD-10-CM

## 2023-03-28 RX ORDER — BENAZEPRIL HYDROCHLORIDE AND HYDROCHLOROTHIAZIDE 20; 12.5 MG/1; MG/1
1 TABLET ORAL DAILY
Qty: 90 TABLET | Refills: 1 | Status: SHIPPED | OUTPATIENT
Start: 2023-03-28

## 2023-03-28 RX ORDER — MONTELUKAST SODIUM 10 MG/1
TABLET ORAL
Qty: 90 TABLET | Refills: 1 | Status: SHIPPED | OUTPATIENT
Start: 2023-03-28

## 2023-03-28 RX ORDER — CARVEDILOL 12.5 MG/1
TABLET ORAL
Qty: 180 TABLET | Refills: 1 | Status: SHIPPED | OUTPATIENT
Start: 2023-03-28

## 2023-03-28 NOTE — TELEPHONE ENCOUNTER
Please review. Protocol failed / No protocol. Requested Prescriptions   Pending Prescriptions Disp Refills    CARVEDILOL 12.5 MG Oral Tab [Pharmacy Med Name: CARVEDILOL 12.5MG TABLETS] 180 tablet 1     Sig: TAKE 1 TABLET BY MOUTH TWICE DAILY WITH MEALS       Hypertensive Medications Protocol Failed - 3/28/2023  5:49 AM        Failed - CMP or BMP in past 6 months     No results found for this or any previous visit (from the past 4392 hour(s)).             Failed - In person appointment or virtual visit in the past 6 months     Recent Outpatient Visits              7 months ago Medicare annual wellness visit, initial    [address], [place]    Office Visit    11 months ago Neck pain    [address], [address], Reyes Bob,     Office Visit    11 months ago History of implantation of penile prosthesis    [facility], [address] [place],     Office Visit    11 months ago     Urology - Marie Belcher MD    Office Visit    12 months ago Type 2 diabetes mellitus with hyperglycemia, unspecified whether long term insulin use ([facility]    [address], [address], Svetlana North MD    Office Visit                      Passed - In person appointment in the past 12 or next 3 months     Recent Outpatient Visits              7 months ago Medicare annual wellness visit, initial    [address] [facility], [place], [place]    Office Visit    11 months ago Neck pain    [address], [address], Reyes Patel, DO    Office Visit    11 months ago History of implantation of penile prosthesis    [facility], [address]    Office Visit    11 months ago     Urology Jesus Belcher MD    Office Visit    12 months ago Type 2 diabetes mellitus with hyperglycemia, unspecified whether long term insulin use ([facility]    [facility] [place]    Office Visit                      Passed - Last BP reading less than 140/90     BP Readings from Last 1 Encounters:  08/22/22 : 110/72              Passed - EGFRCR or GFRAA > 50     GFR Evaluation  GFRAA: 59 , resulted on 4/30/2022            MONTELUKAST 10 MG Oral Tab [Pharmacy Med Name: MONTELUKAST 10MG TABLETS] 90 tablet 1     Sig: TAKE 1 TABLET(10 MG) BY MOUTH EVERY NIGHT       Asthma & COPD Medication Protocol Failed - 3/28/2023  5:49 AM        Failed - In person appointment or virtual visit in the past 6 mos or appointment in next 3 mos     Recent Outpatient Visits              7 months ago Medicare annual wellness visit, initial    [address] [place] [address]    Office Visit    11 months ago Neck pain    [facility], [address], [place]    Office Visit    11 months ago History of implantation of penile prosthesis    [facility], [address] [place] [address]    Office Visit    11 months ago     Urology - Vivek Royal MD    Office Visit    12 months ago Type 2 diabetes mellitus with hyperglycemia, unspecified whether long term insulin use ([facility]    [address] [place], [place] [address]Efe MD    Office Visit                        BENAZEPRIL-HYDROCHLOROTHIAZIDE 20-12.5 MG Oral Tab [Pharmacy Med Name: BENAZEPRIL/HCTZ 20/12.5MG TABLETS] 90 tablet 1     Sig: Take 1 tablet by mouth daily. Hypertensive Medications Protocol Failed - 3/28/2023  5:49 AM        Failed - CMP or BMP in past 6 months     No results found for this or any previous visit (from the past 4392 hour(s)).             Failed - In person appointment or virtual visit in the past 6 months     Recent Outpatient Visits              7 months ago Medicare annual wellness visit, initial    [address], [place], [place], [place]    Office Visit    11 months ago Neck pain    [facility], [address], [place], [place]    Office Visit    11 months ago History of implantation of penile prosthesis    [facility], [address], [place], [place]    Office Visit    11 months ago     Urology - Grazyna Edmonds MD    Office Visit    12 months ago Type 2 diabetes mellitus with hyperglycemia, unspecified whether long term insulin use [facility])    [address], [address], Idris Romero MD    Office Visit                      Passed - In person appointment in the past 12 or next 3 months     Recent Outpatient Visits              7 months ago Medicare annual wellness visit, initial    [address], [place], [place], [place]    Office Visit    11 months ago Neck pain    [address], [address], Reyes Patel,     Office Visit    11 months ago History of implantation of penile prosthesis    [facility], [address], [place], [facility],     Office Visit    11 months ago     Urology - aNtalie Royal MD    Office Visit    12 months ago Type 2 diabetes mellitus with hyperglycemia, unspecified whether long term insulin use [facility])    [address], [address], Idris Romero MD    Office Visit                      Passed - Last BP reading less than 140/90     BP Readings from Last 1 Encounters:  08/22/22 : 110/72              Passed - EGFRCR or GFRAA > 50     GFR Evaluation  GFRAA: 61 , resulted on 4/30/2022

## 2023-04-17 DIAGNOSIS — M10.9 ACUTE GOUT OF ELBOW, UNSPECIFIED CAUSE, UNSPECIFIED LATERALITY: ICD-10-CM

## 2023-04-17 DIAGNOSIS — R60.0 BILATERAL LEG EDEMA: ICD-10-CM

## 2023-04-18 RX ORDER — METOLAZONE 5 MG/1
TABLET ORAL
Qty: 90 TABLET | Refills: 1 | Status: SHIPPED | OUTPATIENT
Start: 2023-04-18

## 2023-04-18 RX ORDER — INDOMETHACIN 50 MG/1
CAPSULE ORAL
Qty: 180 CAPSULE | Refills: 1 | Status: SHIPPED | OUTPATIENT
Start: 2023-04-18

## 2023-04-18 NOTE — TELEPHONE ENCOUNTER
Protocol failed or has No Protocol, please review  Requested Prescriptions   Pending Prescriptions Disp Refills    METOLAZONE 5 MG Oral Tab [Pharmacy Med Name: METOLAZONE 5MG TABLETS] 90 tablet 1     Sig: TAKE 1 TABLET(5 MG) BY MOUTH DAILY       Hypertensive Medications Protocol Failed - 4/17/2023  3:59 PM        Failed - CMP or BMP in past 6 months     No results found for this or any previous visit (from the past 4392 hour(s)).               Failed - In person appointment or virtual visit in the past 6 months     Recent Outpatient Visits              7 months ago Medicare annual wellness visit, initial    [address] [place] [address], Gretchen aMrtell, [place]    Office Visit    11 months ago Neck pain    [facility], [address], [facility] Reyes Harrison DO    Office Visit    1 year ago History of implantation of penile prosthesis    [facility], [address],     Office Visit    1 year ago     Urology - Sophie Rea MD    Office Visit    1 year ago Type 2 diabetes mellitus with hyperglycemia, unspecified whether long term insulin use ([facility]    Oriana Oneil MD    Office Visit                      Passed - In person appointment in the past 12 or next 3 months     Recent Outpatient Visits              7 months ago Medicare annual wellness visit, initial    [address] [place] [address], Gretchen Mart[place], [place]    Office Visit    11 months ago Neck pain    [address] [place],[address], [address], [place] Reyes Vincent DO    Office Visit    1 year ago History of implantation of penile prosthesis    [address] [place], [place] [address]Gretchen DO    Office Visit    1 year ago     Urology - Sophie Rea MD    Office Visit    1 year ago Type 2 diabetes mellitus with hyperglycemia, unspecified whether long term insulin use ([facility]    Germania Prather MD    Office Visit                      Passed - Last BP reading less than 140/90     BP Readings from Last 1 Encounters:  08/22/22 : 110/72                Passed - EGFRCR or GFRAA > 50     GFR Evaluation  GFRAA: 59 , resulted on 4/30/2022              INDOMETHACIN 50 MG Oral Cap [Pharmacy Med Name: INDOMETHACIN 50MG CAPSULES] 180 capsule 1     Sig: TAKE 1 CAPSULE(50 MG) BY MOUTH TWICE DAILY WITH MEALS       Non-Narcotic Pain Medication Protocol Failed - 4/17/2023  3:59 PM        Failed - In person appointment or virtual visit in the past 6 mos or appointment in next 3 mos     Recent Outpatient Visits              7 months ago Medicare annual wellness visit, initial    [address] [place], [place]    Office Visit    11 months ago Neck pain    [address], [address], Shantal Patel, DO    Office Visit    1 year ago History of implantation of penile prosthesis    [facility], [place]    Office Visit    1 year ago     Urology - Reta Wheeler MD    Office Visit    1 year ago Type 2 diabetes mellitus with hyperglycemia, unspecified whether long term insulin use ([facility]    [address], [address], Germania Shore MD    Office Visit                           Recent Outpatient Visits              [facility]    [address], [address], [facility],     Office Visit    11 months ago Neck pain    [facility], [address], Shantal Patel DO    Office Visit    1 year ago History of implantation of penile prosthesis    [facility], [facility] [facility], [facility], DO    Office Visit    1 year ago     Urology - Angel eNal MD    Office Visit    1 year ago Type 2 diabetes mellitus with hyperglycemia, unspecified whether long term insulin use [facility])    Eddie Becker MD    Office Visit

## 2023-04-19 ENCOUNTER — TELEPHONE (OUTPATIENT)
Dept: FAMILY MEDICINE CLINIC | Facility: CLINIC | Age: 58
End: 2023-04-19

## 2023-05-04 ENCOUNTER — MED REC SCAN ONLY (OUTPATIENT)
Dept: FAMILY MEDICINE CLINIC | Facility: CLINIC | Age: 58
End: 2023-05-04

## 2023-05-17 NOTE — TELEPHONE ENCOUNTER
Refill Protocol Appointment Criteria  · Appointment scheduled in the past 6 months or in the next 3 months  Recent Visits       Provider Department Primary Dx    1 month ago Stanton Piper DO [facility] [place], [facility], [address], [facility] Uncontrolled type 2
I concur with the Admission Order and I certify that services are provided in accordance with Section 42 CFR § 412.3

## 2023-06-12 ENCOUNTER — OFFICE VISIT (OUTPATIENT)
Dept: FAMILY MEDICINE CLINIC | Facility: CLINIC | Age: 58
End: 2023-06-12

## 2023-06-12 ENCOUNTER — LAB ENCOUNTER (OUTPATIENT)
Dept: LAB | Age: 58
End: 2023-06-12
Payer: MEDICARE

## 2023-06-12 VITALS
HEIGHT: 68 IN | WEIGHT: 243 LBS | SYSTOLIC BLOOD PRESSURE: 110 MMHG | BODY MASS INDEX: 36.83 KG/M2 | TEMPERATURE: 97 F | DIASTOLIC BLOOD PRESSURE: 74 MMHG | HEART RATE: 91 BPM | OXYGEN SATURATION: 99 %

## 2023-06-12 DIAGNOSIS — Z79.4 CONTROLLED TYPE 2 DIABETES MELLITUS WITH RIGHT EYE AFFECTED BY MILD NONPROLIFERATIVE RETINOPATHY WITHOUT MACULAR EDEMA, WITH LONG-TERM CURRENT USE OF INSULIN (HCC): ICD-10-CM

## 2023-06-12 DIAGNOSIS — E11.3291 CONTROLLED TYPE 2 DIABETES MELLITUS WITH RIGHT EYE AFFECTED BY MILD NONPROLIFERATIVE RETINOPATHY WITHOUT MACULAR EDEMA, WITH LONG-TERM CURRENT USE OF INSULIN (HCC): ICD-10-CM

## 2023-06-12 DIAGNOSIS — Z85.46 HISTORY OF PROSTATE CANCER: ICD-10-CM

## 2023-06-12 DIAGNOSIS — L03.818 CELLULITIS OF OTHER SPECIFIED SITE: Primary | ICD-10-CM

## 2023-06-12 PROBLEM — E04.1 THYROID NODULE: Status: RESOLVED | Noted: 2018-01-10 | Resolved: 2023-06-12

## 2023-06-12 PROBLEM — R97.20 ELEVATED PSA: Status: RESOLVED | Noted: 2019-05-29 | Resolved: 2023-06-12

## 2023-06-12 LAB
CARTRIDGE LOT#: 595 NUMERIC
HEMOGLOBIN A1C: 6.7 % (ref 4.3–5.6)

## 2023-06-12 PROCEDURE — 3044F HG A1C LEVEL LT 7.0%: CPT

## 2023-06-12 PROCEDURE — 3078F DIAST BP <80 MM HG: CPT

## 2023-06-12 PROCEDURE — 99215 OFFICE O/P EST HI 40 MIN: CPT

## 2023-06-12 PROCEDURE — 3074F SYST BP LT 130 MM HG: CPT

## 2023-06-12 PROCEDURE — 3008F BODY MASS INDEX DOCD: CPT

## 2023-06-12 PROCEDURE — 83036 HEMOGLOBIN GLYCOSYLATED A1C: CPT

## 2023-06-12 RX ORDER — CEPHALEXIN 500 MG/1
500 CAPSULE ORAL 4 TIMES DAILY
Qty: 28 CAPSULE | Refills: 0 | Status: SHIPPED | OUTPATIENT
Start: 2023-06-12 | End: 2023-06-19

## 2023-06-12 RX ORDER — FLUTICASONE PROPIONATE 50 MCG
1 SPRAY, SUSPENSION (ML) NASAL DAILY
COMMUNITY
End: 2023-06-12

## 2023-06-13 ENCOUNTER — TELEPHONE (OUTPATIENT)
Dept: ENDOCRINOLOGY CLINIC | Facility: CLINIC | Age: 58
End: 2023-06-13

## 2023-06-13 LAB
CHOL/HDLC RATIO: 2.9 (CALC)
CHOLESTEROL, TOTAL: 123 MG/DL
CREATININE, RANDOM URINE: 182 MG/DL (ref 20–320)
HDL CHOLESTEROL: 42 MG/DL
LDL-CHOLESTEROL: 61 MG/DL (CALC)
MICROALBUMIN/CREATININE RATIO, RANDOM URINE: 90 MCG/MG CREAT
MICROALBUMIN: 16.3 MG/DL
NON-HDL CHOLESTEROL: 81 MG/DL (CALC)
PSA, TOTAL: 0.08 NG/ML
TRIGLYCERIDES: 115 MG/DL

## 2023-06-23 ENCOUNTER — APPOINTMENT (OUTPATIENT)
Dept: WOUND CARE | Facility: HOSPITAL | Age: 58
End: 2023-06-23
Attending: NURSE PRACTITIONER
Payer: MEDICAID

## 2023-06-23 ENCOUNTER — TELEPHONE (OUTPATIENT)
Dept: ENDOCRINOLOGY CLINIC | Facility: CLINIC | Age: 58
End: 2023-06-23

## 2023-06-23 NOTE — TELEPHONE ENCOUNTER
Fracisco Naidu, [facility] calling to confirm receipt of fax sent on 5/11 for diabetic shoes. Please call at [phone],[facility].

## 2023-06-26 ENCOUNTER — TELEPHONE (OUTPATIENT)
Dept: FAMILY MEDICINE CLINIC | Facility: CLINIC | Age: 58
End: 2023-06-26

## 2023-06-26 NOTE — TELEPHONE ENCOUNTER
Received message from LAM at wound clinic that patient did not go to appointment. Patient with wound to the great toe. Patient recently discharged from hospital for osteomyelitis. Follows with podiatry at [facility]. Spoke with patient over the phone on Friday, 6/23/23 to discuss missed appointment. Patient denies pain, discharge, fever, swelling, erythema. Advised to go to the ER if symptoms occur. Can we please call the patient to check and see how he is feeling. Please have him schedule appointment with wound clinic ASAP. If unable to get appointment with wound this week, please have patient schedule with myself or PCP to monitor progress.      LAM Juares

## 2023-06-26 NOTE — TELEPHONE ENCOUNTER
Called patient, no answer. Unable to leave message, mailbox full. Patient rescheduled for wound clinic on 7/11. Call center, please schedule patient to see Josey Persons this week for a wound check.

## 2023-06-28 ENCOUNTER — APPOINTMENT (OUTPATIENT)
Dept: GENERAL RADIOLOGY | Facility: HOSPITAL | Age: 58
End: 2023-06-28
Attending: EMERGENCY MEDICINE
Payer: MEDICARE

## 2023-06-28 ENCOUNTER — HOSPITAL ENCOUNTER (INPATIENT)
Facility: HOSPITAL | Age: 58
LOS: 4 days | Discharge: HOME | End: 2023-07-02
Attending: EMERGENCY MEDICINE | Admitting: HOSPITALIST
Payer: MEDICARE

## 2023-06-28 ENCOUNTER — APPOINTMENT (OUTPATIENT)
Dept: MRI IMAGING | Facility: HOSPITAL | Age: 58
End: 2023-06-28
Attending: HOSPITALIST
Payer: MEDICARE

## 2023-06-28 ENCOUNTER — OFFICE VISIT (OUTPATIENT)
Dept: FAMILY MEDICINE CLINIC | Facility: CLINIC | Age: 58
End: 2023-06-28

## 2023-06-28 VITALS
HEART RATE: 94 BPM | WEIGHT: 248 LBS | DIASTOLIC BLOOD PRESSURE: 80 MMHG | HEIGHT: 69 IN | TEMPERATURE: 98 F | SYSTOLIC BLOOD PRESSURE: 126 MMHG | BODY MASS INDEX: 36.73 KG/M2 | OXYGEN SATURATION: 98 %

## 2023-06-28 DIAGNOSIS — I10 ESSENTIAL HYPERTENSION: ICD-10-CM

## 2023-06-28 DIAGNOSIS — M54.2 CERVICALGIA: ICD-10-CM

## 2023-06-28 DIAGNOSIS — L97.529 CHRONIC ULCER OF GREAT TOE OF LEFT FOOT, UNSPECIFIED ULCER STAGE (HCC): Primary | ICD-10-CM

## 2023-06-28 DIAGNOSIS — Z87.39 HISTORY OF OSTEOMYELITIS: ICD-10-CM

## 2023-06-28 DIAGNOSIS — L08.9 TOE INFECTION: Primary | ICD-10-CM

## 2023-06-28 DIAGNOSIS — E11.9 TYPE 2 DIABETES MELLITUS WITHOUT COMPLICATION, WITHOUT LONG-TERM CURRENT USE OF INSULIN (HCC): ICD-10-CM

## 2023-06-28 DIAGNOSIS — H61.23 BILATERAL IMPACTED CERUMEN: ICD-10-CM

## 2023-06-28 LAB
ANION GAP SERPL CALC-SCNC: 7 MMOL/L (ref 0–18)
BASOPHILS # BLD AUTO: 0.02 X10(3) UL (ref 0–0.2)
BASOPHILS NFR BLD AUTO: 0.3 %
BUN BLD-MCNC: 22 MG/DL (ref 7–18)
BUN/CREAT SERPL: 14.8 (ref 10–20)
CALCIUM BLD-MCNC: 9.1 MG/DL (ref 8.5–10.1)
CHLORIDE SERPL-SCNC: 110 MMOL/L (ref 98–112)
CO2 SERPL-SCNC: 23 MMOL/L (ref 21–32)
CREAT BLD-MCNC: 1.49 MG/DL
CRP SERPL-MCNC: 0.52 MG/DL (ref ?–0.3)
DEPRECATED RDW RBC AUTO: 42.2 FL (ref 35.1–46.3)
EOSINOPHIL # BLD AUTO: 0.24 X10(3) UL (ref 0–0.7)
EOSINOPHIL NFR BLD AUTO: 4.1 %
ERYTHROCYTE [DISTWIDTH] IN BLOOD BY AUTOMATED COUNT: 14.2 % (ref 11–15)
ERYTHROCYTE [SEDIMENTATION RATE] IN BLOOD: 75 MM/HR
GFR SERPLBLD BASED ON 1.73 SQ M-ARVRAT: 54 ML/MIN/1.73M2 (ref 60–?)
GLUCOSE BLD-MCNC: 219 MG/DL (ref 70–99)
GLUCOSE BLDC GLUCOMTR-MCNC: 100 MG/DL (ref 70–99)
GLUCOSE BLDC GLUCOMTR-MCNC: 137 MG/DL (ref 70–99)
GLUCOSE BLDC GLUCOMTR-MCNC: 212 MG/DL (ref 70–99)
HCT VFR BLD AUTO: 35.4 %
HGB BLD-MCNC: 11.7 G/DL
IMM GRANULOCYTES # BLD AUTO: 0.01 X10(3) UL (ref 0–1)
IMM GRANULOCYTES NFR BLD: 0.2 %
LACTATE SERPL-SCNC: 1.4 MMOL/L (ref 0.4–2)
LYMPHOCYTES # BLD AUTO: 1.31 X10(3) UL (ref 1–4)
LYMPHOCYTES NFR BLD AUTO: 22.1 %
MCH RBC QN AUTO: 27.3 PG (ref 26–34)
MCHC RBC AUTO-ENTMCNC: 33.1 G/DL (ref 31–37)
MCV RBC AUTO: 82.5 FL
MONOCYTES # BLD AUTO: 0.29 X10(3) UL (ref 0.1–1)
MONOCYTES NFR BLD AUTO: 4.9 %
NEUTROPHILS # BLD AUTO: 4.05 X10 (3) UL (ref 1.5–7.7)
NEUTROPHILS # BLD AUTO: 4.05 X10(3) UL (ref 1.5–7.7)
NEUTROPHILS NFR BLD AUTO: 68.4 %
OSMOLALITY SERPL CALC.SUM OF ELEC: 300 MOSM/KG (ref 275–295)
PLATELET # BLD AUTO: 256 10(3)UL (ref 150–450)
POTASSIUM SERPL-SCNC: 4.1 MMOL/L (ref 3.5–5.1)
RBC # BLD AUTO: 4.29 X10(6)UL
SODIUM SERPL-SCNC: 140 MMOL/L (ref 136–145)
WBC # BLD AUTO: 5.9 X10(3) UL (ref 4–11)

## 2023-06-28 PROCEDURE — 73630 X-RAY EXAM OF FOOT: CPT | Performed by: EMERGENCY MEDICINE

## 2023-06-28 PROCEDURE — 73718 MRI LOWER EXTREMITY W/O DYE: CPT | Performed by: HOSPITALIST

## 2023-06-28 PROCEDURE — 99223 1ST HOSP IP/OBS HIGH 75: CPT | Performed by: HOSPITALIST

## 2023-06-28 RX ORDER — ACETAMINOPHEN 325 MG/1
650 TABLET ORAL EVERY 4 HOURS PRN
Status: DISCONTINUED | OUTPATIENT
Start: 2023-06-28 | End: 2023-07-02

## 2023-06-28 RX ORDER — CARVEDILOL 12.5 MG/1
12.5 TABLET ORAL 2 TIMES DAILY WITH MEALS
Status: DISCONTINUED | OUTPATIENT
Start: 2023-06-28 | End: 2023-07-02

## 2023-06-28 RX ORDER — DORZOLAMIDE HCL 20 MG/ML
1 SOLUTION/ DROPS OPHTHALMIC 2 TIMES DAILY
Status: DISCONTINUED | OUTPATIENT
Start: 2023-06-28 | End: 2023-07-02

## 2023-06-28 RX ORDER — NICOTINE POLACRILEX 4 MG
15 LOZENGE BUCCAL
Status: DISCONTINUED | OUTPATIENT
Start: 2023-06-28 | End: 2023-07-02

## 2023-06-28 RX ORDER — BACLOFEN 10 MG/1
10 TABLET ORAL
Status: DISCONTINUED | OUTPATIENT
Start: 2023-06-28 | End: 2023-07-02

## 2023-06-28 RX ORDER — AMLODIPINE BESYLATE 5 MG/1
5 TABLET ORAL 2 TIMES DAILY
Status: DISCONTINUED | OUTPATIENT
Start: 2023-06-28 | End: 2023-07-02

## 2023-06-28 RX ORDER — HEPARIN SODIUM 5000 [USP'U]/ML
5000 INJECTION, SOLUTION INTRAVENOUS; SUBCUTANEOUS EVERY 12 HOURS SCHEDULED
Status: DISCONTINUED | OUTPATIENT
Start: 2023-06-28 | End: 2023-07-02

## 2023-06-28 RX ORDER — VANCOMYCIN 1.75 GRAM/500 ML IN 0.9 % SODIUM CHLORIDE INTRAVENOUS
15 ONCE
Status: COMPLETED | OUTPATIENT
Start: 2023-06-28 | End: 2023-06-28

## 2023-06-28 RX ORDER — PROCHLORPERAZINE EDISYLATE 5 MG/ML
5 INJECTION INTRAMUSCULAR; INTRAVENOUS EVERY 8 HOURS PRN
Status: DISCONTINUED | OUTPATIENT
Start: 2023-06-28 | End: 2023-07-02

## 2023-06-28 RX ORDER — NICOTINE POLACRILEX 4 MG
30 LOZENGE BUCCAL
Status: DISCONTINUED | OUTPATIENT
Start: 2023-06-28 | End: 2023-07-02

## 2023-06-28 RX ORDER — BRIMONIDINE TARTRATE 2 MG/ML
1 SOLUTION/ DROPS OPHTHALMIC 2 TIMES DAILY
COMMUNITY
Start: 2023-06-11

## 2023-06-28 RX ORDER — BENAZEPRIL HYDROCHLORIDE AND HYDROCHLOROTHIAZIDE 20; 12.5 MG/1; MG/1
1 TABLET ORAL DAILY
Status: DISCONTINUED | OUTPATIENT
Start: 2023-06-29 | End: 2023-06-28 | Stop reason: ALTCHOICE

## 2023-06-28 RX ORDER — HYDROCODONE BITARTRATE AND ACETAMINOPHEN 5; 325 MG/1; MG/1
1 TABLET ORAL EVERY 4 HOURS PRN
Status: DISCONTINUED | OUTPATIENT
Start: 2023-06-28 | End: 2023-07-02

## 2023-06-28 RX ORDER — DEXTROSE MONOHYDRATE 25 G/50ML
50 INJECTION, SOLUTION INTRAVENOUS
Status: DISCONTINUED | OUTPATIENT
Start: 2023-06-28 | End: 2023-07-02

## 2023-06-28 RX ORDER — HYDROCODONE BITARTRATE AND ACETAMINOPHEN 5; 325 MG/1; MG/1
2 TABLET ORAL EVERY 4 HOURS PRN
Status: DISCONTINUED | OUTPATIENT
Start: 2023-06-28 | End: 2023-07-02

## 2023-06-28 RX ORDER — AMITRIPTYLINE HYDROCHLORIDE 25 MG/1
25 TABLET, FILM COATED ORAL 2 TIMES DAILY
Status: DISCONTINUED | OUTPATIENT
Start: 2023-06-28 | End: 2023-07-02

## 2023-06-28 RX ORDER — ONDANSETRON 2 MG/ML
4 INJECTION INTRAMUSCULAR; INTRAVENOUS EVERY 6 HOURS PRN
Status: DISCONTINUED | OUTPATIENT
Start: 2023-06-28 | End: 2023-07-02

## 2023-06-28 RX ORDER — MONTELUKAST SODIUM 10 MG/1
10 TABLET ORAL NIGHTLY
Status: DISCONTINUED | OUTPATIENT
Start: 2023-06-28 | End: 2023-07-02

## 2023-06-28 RX ORDER — LATANOPROST 50 UG/ML
1 SOLUTION/ DROPS OPHTHALMIC NIGHTLY
Status: DISCONTINUED | OUTPATIENT
Start: 2023-06-28 | End: 2023-07-02

## 2023-06-28 RX ORDER — FLUTICASONE PROPIONATE 50 MCG
2 SPRAY, SUSPENSION (ML) NASAL
Status: DISCONTINUED | OUTPATIENT
Start: 2023-06-28 | End: 2023-07-02

## 2023-06-28 RX ORDER — ACETAMINOPHEN 500 MG
500 TABLET ORAL EVERY 4 HOURS PRN
Status: DISCONTINUED | OUTPATIENT
Start: 2023-06-28 | End: 2023-07-02

## 2023-06-28 RX ORDER — SODIUM CHLORIDE 9 MG/ML
INJECTION, SOLUTION INTRAVENOUS CONTINUOUS
Status: CANCELLED | OUTPATIENT
Start: 2023-06-28

## 2023-06-28 RX ORDER — METOLAZONE 5 MG/1
5 TABLET ORAL NIGHTLY
Status: DISCONTINUED | OUTPATIENT
Start: 2023-06-28 | End: 2023-07-02

## 2023-06-28 RX ORDER — TEMAZEPAM 15 MG/1
15 CAPSULE ORAL NIGHTLY PRN
Status: DISCONTINUED | OUTPATIENT
Start: 2023-06-28 | End: 2023-07-02

## 2023-06-28 RX ORDER — BRIMONIDINE TARTRATE 2 MG/ML
1 SOLUTION/ DROPS OPHTHALMIC 2 TIMES DAILY
Status: DISCONTINUED | OUTPATIENT
Start: 2023-06-28 | End: 2023-07-02

## 2023-06-28 RX ORDER — TAMSULOSIN HYDROCHLORIDE 0.4 MG/1
0.4 CAPSULE ORAL
Status: DISCONTINUED | OUTPATIENT
Start: 2023-06-28 | End: 2023-07-02

## 2023-06-28 RX ORDER — FUROSEMIDE 40 MG/1
80 TABLET ORAL DAILY
Status: DISCONTINUED | OUTPATIENT
Start: 2023-06-29 | End: 2023-07-02

## 2023-06-28 RX ORDER — ATORVASTATIN CALCIUM 40 MG/1
40 TABLET, FILM COATED ORAL NIGHTLY
Status: DISCONTINUED | OUTPATIENT
Start: 2023-06-28 | End: 2023-07-02

## 2023-06-28 NOTE — ED QUICK NOTES
Per Dr. Chantell Moyer, only 1 set of blood cultures needed. Okay to start ABX.  Blood cultures and lactic collected by Dinora Langford PCT

## 2023-06-28 NOTE — H&P
[facility]    PATIENT'S NAME: Girma Griffincock   ATTENDING PHYSICIAN: Jaxson Romano DO   PATIENT ACCOUNT#:   [de-identified]    LOCATION:  [address]  MEDICAL RECORD #:   [id number]       YOB: 1965  ADMISSION DATE:       06/28/2023    HISTORY AND PHYSICAL EXAMINATION    CHIEF COMPLAINT:  Left great toe osteomyelitis. HISTORY OF PRESENT ILLNESS:  The patient is a 54-year-old [facility] American male with underlying diabetes mellitus type 2. He was hospitalized in a different institution late February 2023 for left great toe infection. At that time, imaging studies did not suggest osteomyelitis. He had MSSA bacteremia at that time. He followed up with Podiatry, and repeat MRI in March 2023 showed osteomyelitis of the distal phalanx of the left great toe. He had partial left toe amputation. Since then, he said the ulcer has not been healing and lately has been draining with swelling of his left great toe. He denies any fever or chills. Today, he was sent to the emergency department at [facility] by his primary care physician. CBC was unremarkable. Sedimentation rate 75. Basic metabolic panel and C-reactive protein are still pending. X-ray of the foot showed bone destruction and fragmentation from osteomyelitis involving the mid to distal aspect of the distal phalanx of the left great toe. The patient was started on IV antibiotics, vancomycin and Zosyn, and he will be admitted to the hospital for further management. Also, blood cultures were obtained. PAST MEDICAL HISTORY:  Diabetes mellitus type 2; obesity; osteoarthritis; hypertension; hyperlipidemia; gastroesophageal reflux disease; gout; left ventricular diastolic dysfunction; enlarged prostate; history of prostate cancer status post brachytherapy seed implant.   Arterial duplex study was done in an outside facility 2 months ago which showed patent arteries but possible occlusion of left dorsalis pedis.    PAST SURGICAL HISTORY:  Posterior cervical laminectomy, right shoulder rotator cuff repair. MEDICATIONS:  Please see medication reconciliation list.    ALLERGIES:  No known drug allergies. FAMILY HISTORY:  Mother had diabetes mellitus type 2, hypertension, and cerebrovascular accident. Father had prostate cancer and hypertension. SOCIAL HISTORY:  No tobacco, alcohol, or drug use. Lives with his family. Independent in his basic activities of daily living. REVIEW OF SYSTEMS:  Persistent ulcer since March with lately increased swelling of left great toe with drainage of the plantar aspect of distal toe ulcer. No fever or chills. He does have chronic peripheral neuropathy and does not have much sensation in his feet. Other 12-point review of systems negative. PHYSICAL EXAMINATION:    GENERAL:  Alert and oriented to time, place, and person, no acute distress. VITAL SIGNS:  Temperature 97.2, pulse 99, respiratory rate 18, blood pressure 119/81, pulse ox 99% on room air. HEENT:  Atraumatic. Oropharynx clear. Moist mucous membranes. Normal hard and soft palate. Eyes:  Anicteric sclerae. NECK:  Supple. No lymphadenopathy. Trachea midline. Full range of motion. LUNGS:  Clear to auscultation bilaterally. Normal respiratory effort. Diminished breathing sounds both lung bases. HEART:  Regular rate and rhythm. S1 and S2 auscultated. No murmur. ABDOMEN:  Soft, nondistended. No tenderness. Positive bowel sounds. Obese. EXTREMITIES:  No edema, clubbing, or cyanosis. Dorsalis pedis pulses are diminished bilaterally. Left great toe plantar aspect ulceration with inflamed base and drainage of yellowish discharge. NEUROLOGIC:  Decreased sensation to light touch in both feet. Otherwise, motor and sensory intact. ASSESSMENT AND PLAN:    1. Left great toe ulceration underlying distal phalangeal osteomyelitis. 2.   Diabetes mellitus type 2.  3.   Hypertension.   4. Obesity. The patient will be admitted to general medical floor. IV vancomycin and Zosyn. MRI scan of the left foot. Podiatry and Infectious Disease consults. Wound Service consult. Pain control. Follow up on blood cultures. Further recommendations to follow.     Dictated By Heather Garibay MD  d: 06/28/2023 15:28:08  t: 06/28/2023 15:46:15  Job [id number]  NT/

## 2023-06-28 NOTE — ED QUICK NOTES
Orders for admission, patient is aware of plan and ready to go upstairs. Any questions, please call ED RN Bud Look at extension [phone].      Patient Covid vaccination status: Unvaccinated     COVID Test Ordered in ED: None    COVID Suspicion at Admission: N/A    Running Infusions:  None    Mental Status/LOC at time of transport: ALERT X4    Other pertinent information: On room air, ambulates independently, 20G R AC    CIWA score: N/A   NIH score:  N/A

## 2023-06-28 NOTE — PLAN OF CARE
MRI ordered. Vanco and zosyn. ID consult    Problem: Patient Centered Care  Goal: Patient preferences are identified and integrated in the patient's plan of care  Description: Interventions:  - What would you like us to know as we care for you? From home alone, has glaucoma, diabetes type 2  - Provide timely, complete, and accurate information to patient/family  - Incorporate patient and family knowledge, values, beliefs, and cultural backgrounds into the planning and delivery of care  - Encourage patient/family to participate in care and decision-making at the level they choose  - Honor patient and family perspectives and choices  6/28/2023 1810 by Sparkle Cobos RN  Outcome: Progressing  6/28/2023 1809 by Sparkle Cobos RN  Outcome: Progressing     Problem: Diabetes/Glucose Control  Goal: Glucose maintained within prescribed range  Description: INTERVENTIONS:  - Monitor Blood Glucose as ordered  - Assess for signs and symptoms of hyperglycemia and hypoglycemia  - Administer ordered medications to maintain glucose within target range  - Assess barriers to adequate nutritional intake and initiate nutrition consult as needed  - Instruct patient on self management of diabetes  6/28/2023 1810 by Sparkle Cobos RN  Outcome: Progressing  6/28/2023 1809 by Sparkle Cobos RN  Outcome: Progressing     Problem: Patient/Family Goals  Goal: Patient/Family Long Term Goal  Description: Patient's Long Term Goal: to resolve issue with my toe.     Interventions:  - imaging, antibiotics  - See additional Care Plan goals for specific interventions  Outcome: Progressing  Goal: Patient/Family Short Term Goal  Description: Patient's Short Term Goal: Resolve issues with foot    Interventions:   - imaging, antibiotics  - See additional Care Plan goals for specific interventions  Outcome: Progressing     Problem: METABOLIC/FLUID AND ELECTROLYTES - ADULT  Goal: Glucose maintained within prescribed range  Description: INTERVENTIONS:  - Monitor Blood Glucose as ordered  - Assess for signs and symptoms of hyperglycemia and hypoglycemia  - Administer ordered medications to maintain glucose within target range  - Assess barriers to adequate nutritional intake and initiate nutrition consult as needed  - Instruct patient on self management of diabetes  6/28/2023 1810 by Uriel Mckenzie RN  Outcome: Progressing  6/28/2023 1809 by Uriel Mckenzie RN  Outcome: Progressing     Problem: SKIN/TISSUE INTEGRITY - ADULT  Goal: Skin integrity remains intact  Description: INTERVENTIONS  - Assess and document risk factors for pressure ulcer development  - Assess and document skin integrity  - Monitor for areas of redness and/or skin breakdown  - Initiate interventions, skin care algorithm/standards of care as needed  Outcome: Progressing     Problem: SKIN/TISSUE INTEGRITY - ADULT  Goal: Incision(s), wounds(s) or drain site(s) healing without S/S of infection  Description: INTERVENTIONS:  - Assess and document risk factors for pressure ulcer development  - Assess and document skin integrity  - Assess and document dressing/incision, wound bed, drain sites and surrounding tissue  - Implement wound care per orders  - Initiate isolation precautions as appropriate  - Initiate Pressure Ulcer prevention bundle as indicated  Outcome: Not Progressing

## 2023-06-29 ENCOUNTER — APPOINTMENT (OUTPATIENT)
Dept: GENERAL RADIOLOGY | Facility: HOSPITAL | Age: 58
End: 2023-06-29
Attending: PODIATRIST
Payer: MEDICARE

## 2023-06-29 ENCOUNTER — ANESTHESIA EVENT (OUTPATIENT)
Dept: SURGERY | Facility: HOSPITAL | Age: 58
End: 2023-06-29
Payer: MEDICARE

## 2023-06-29 ENCOUNTER — ANESTHESIA (OUTPATIENT)
Dept: SURGERY | Facility: HOSPITAL | Age: 58
End: 2023-06-29
Payer: MEDICARE

## 2023-06-29 DIAGNOSIS — E78.5 HYPERLIPIDEMIA, UNSPECIFIED HYPERLIPIDEMIA TYPE: ICD-10-CM

## 2023-06-29 LAB
ANION GAP SERPL CALC-SCNC: 6 MMOL/L (ref 0–18)
BASOPHILS # BLD AUTO: 0.03 X10(3) UL (ref 0–0.2)
BASOPHILS NFR BLD AUTO: 0.4 %
BUN BLD-MCNC: 21 MG/DL (ref 7–18)
BUN/CREAT SERPL: 14.6 (ref 10–20)
CALCIUM BLD-MCNC: 8.6 MG/DL (ref 8.5–10.1)
CHLORIDE SERPL-SCNC: 111 MMOL/L (ref 98–112)
CO2 SERPL-SCNC: 24 MMOL/L (ref 21–32)
CREAT BLD-MCNC: 1.44 MG/DL
DEPRECATED RDW RBC AUTO: 41.1 FL (ref 35.1–46.3)
EOSINOPHIL # BLD AUTO: 0.32 X10(3) UL (ref 0–0.7)
EOSINOPHIL NFR BLD AUTO: 4.3 %
ERYTHROCYTE [DISTWIDTH] IN BLOOD BY AUTOMATED COUNT: 14.2 % (ref 11–15)
GFR SERPLBLD BASED ON 1.73 SQ M-ARVRAT: 56 ML/MIN/1.73M2 (ref 60–?)
GLUCOSE BLD-MCNC: 146 MG/DL (ref 70–99)
GLUCOSE BLDC GLUCOMTR-MCNC: 104 MG/DL (ref 70–99)
GLUCOSE BLDC GLUCOMTR-MCNC: 109 MG/DL (ref 70–99)
GLUCOSE BLDC GLUCOMTR-MCNC: 116 MG/DL (ref 70–99)
GLUCOSE BLDC GLUCOMTR-MCNC: 119 MG/DL (ref 70–99)
GLUCOSE BLDC GLUCOMTR-MCNC: 125 MG/DL (ref 70–99)
GLUCOSE BLDC GLUCOMTR-MCNC: 131 MG/DL (ref 70–99)
HCT VFR BLD AUTO: 31 %
HGB BLD-MCNC: 10.1 G/DL
IMM GRANULOCYTES # BLD AUTO: 0.02 X10(3) UL (ref 0–1)
IMM GRANULOCYTES NFR BLD: 0.3 %
LYMPHOCYTES # BLD AUTO: 1.76 X10(3) UL (ref 1–4)
LYMPHOCYTES NFR BLD AUTO: 23.8 %
MCH RBC QN AUTO: 26.5 PG (ref 26–34)
MCHC RBC AUTO-ENTMCNC: 32.6 G/DL (ref 31–37)
MCV RBC AUTO: 81.4 FL
MONOCYTES # BLD AUTO: 0.47 X10(3) UL (ref 0.1–1)
MONOCYTES NFR BLD AUTO: 6.4 %
NEUTROPHILS # BLD AUTO: 4.78 X10 (3) UL (ref 1.5–7.7)
NEUTROPHILS # BLD AUTO: 4.78 X10(3) UL (ref 1.5–7.7)
NEUTROPHILS NFR BLD AUTO: 64.8 %
OSMOLALITY SERPL CALC.SUM OF ELEC: 298 MOSM/KG (ref 275–295)
PLATELET # BLD AUTO: 231 10(3)UL (ref 150–450)
POTASSIUM SERPL-SCNC: 3.7 MMOL/L (ref 3.5–5.1)
RBC # BLD AUTO: 3.81 X10(6)UL
SODIUM SERPL-SCNC: 141 MMOL/L (ref 136–145)
WBC # BLD AUTO: 7.4 X10(3) UL (ref 4–11)

## 2023-06-29 PROCEDURE — 0Y6W0Z1 DETACHMENT AT LEFT 4TH TOE, HIGH, OPEN APPROACH: ICD-10-PCS | Performed by: PODIATRIST

## 2023-06-29 PROCEDURE — 99233 SBSQ HOSP IP/OBS HIGH 50: CPT | Performed by: HOSPITALIST

## 2023-06-29 PROCEDURE — 73630 X-RAY EXAM OF FOOT: CPT | Performed by: PODIATRIST

## 2023-06-29 RX ORDER — MORPHINE SULFATE 4 MG/ML
2 INJECTION, SOLUTION INTRAMUSCULAR; INTRAVENOUS EVERY 10 MIN PRN
Status: DISCONTINUED | OUTPATIENT
Start: 2023-06-29 | End: 2023-06-29 | Stop reason: HOSPADM

## 2023-06-29 RX ORDER — PHENYLEPHRINE HCL 10 MG/ML
VIAL (ML) INJECTION AS NEEDED
Status: DISCONTINUED | OUTPATIENT
Start: 2023-06-29 | End: 2023-06-29 | Stop reason: SURG

## 2023-06-29 RX ORDER — ONDANSETRON 2 MG/ML
4 INJECTION INTRAMUSCULAR; INTRAVENOUS EVERY 6 HOURS PRN
Status: DISCONTINUED | OUTPATIENT
Start: 2023-06-29 | End: 2023-06-29 | Stop reason: HOSPADM

## 2023-06-29 RX ORDER — VANCOMYCIN HYDROCHLORIDE 1 G/20ML
INJECTION, POWDER, LYOPHILIZED, FOR SOLUTION INTRAVENOUS AS NEEDED
Status: DISCONTINUED | OUTPATIENT
Start: 2023-06-29 | End: 2023-06-29 | Stop reason: HOSPADM

## 2023-06-29 RX ORDER — METOPROLOL TARTRATE 5 MG/5ML
2.5 INJECTION INTRAVENOUS ONCE
Status: DISCONTINUED | OUTPATIENT
Start: 2023-06-29 | End: 2023-06-29 | Stop reason: HOSPADM

## 2023-06-29 RX ORDER — INSULIN ASPART 100 [IU]/ML
INJECTION, SOLUTION INTRAVENOUS; SUBCUTANEOUS ONCE
Status: DISCONTINUED | OUTPATIENT
Start: 2023-06-29 | End: 2023-06-29 | Stop reason: HOSPADM

## 2023-06-29 RX ORDER — BACLOFEN 10 MG/1
10 TABLET ORAL 3 TIMES DAILY
Qty: 270 TABLET | Refills: 3 | Status: SHIPPED | OUTPATIENT
Start: 2023-06-29

## 2023-06-29 RX ORDER — HYDROMORPHONE HYDROCHLORIDE 1 MG/ML
0.4 INJECTION, SOLUTION INTRAMUSCULAR; INTRAVENOUS; SUBCUTANEOUS EVERY 5 MIN PRN
Status: DISCONTINUED | OUTPATIENT
Start: 2023-06-29 | End: 2023-06-29 | Stop reason: HOSPADM

## 2023-06-29 RX ORDER — SODIUM CHLORIDE, SODIUM LACTATE, POTASSIUM CHLORIDE, CALCIUM CHLORIDE 600; 310; 30; 20 MG/100ML; MG/100ML; MG/100ML; MG/100ML
INJECTION, SOLUTION INTRAVENOUS CONTINUOUS PRN
Status: DISCONTINUED | OUTPATIENT
Start: 2023-06-29 | End: 2023-06-29 | Stop reason: SURG

## 2023-06-29 RX ORDER — MORPHINE SULFATE 10 MG/ML
6 INJECTION, SOLUTION INTRAMUSCULAR; INTRAVENOUS EVERY 10 MIN PRN
Status: DISCONTINUED | OUTPATIENT
Start: 2023-06-29 | End: 2023-06-29 | Stop reason: HOSPADM

## 2023-06-29 RX ORDER — HYDROMORPHONE HYDROCHLORIDE 1 MG/ML
0.2 INJECTION, SOLUTION INTRAMUSCULAR; INTRAVENOUS; SUBCUTANEOUS EVERY 5 MIN PRN
Status: DISCONTINUED | OUTPATIENT
Start: 2023-06-29 | End: 2023-06-29 | Stop reason: HOSPADM

## 2023-06-29 RX ORDER — NALOXONE HYDROCHLORIDE 0.4 MG/ML
80 INJECTION, SOLUTION INTRAMUSCULAR; INTRAVENOUS; SUBCUTANEOUS AS NEEDED
Status: DISCONTINUED | OUTPATIENT
Start: 2023-06-29 | End: 2023-06-29 | Stop reason: HOSPADM

## 2023-06-29 RX ORDER — SODIUM CHLORIDE, SODIUM LACTATE, POTASSIUM CHLORIDE, CALCIUM CHLORIDE 600; 310; 30; 20 MG/100ML; MG/100ML; MG/100ML; MG/100ML
INJECTION, SOLUTION INTRAVENOUS CONTINUOUS
Status: DISCONTINUED | OUTPATIENT
Start: 2023-06-29 | End: 2023-06-29 | Stop reason: HOSPADM

## 2023-06-29 RX ORDER — HYDROMORPHONE HYDROCHLORIDE 1 MG/ML
0.6 INJECTION, SOLUTION INTRAMUSCULAR; INTRAVENOUS; SUBCUTANEOUS EVERY 5 MIN PRN
Status: DISCONTINUED | OUTPATIENT
Start: 2023-06-29 | End: 2023-06-29 | Stop reason: HOSPADM

## 2023-06-29 RX ORDER — ATORVASTATIN CALCIUM 40 MG/1
40 TABLET, FILM COATED ORAL NIGHTLY
Qty: 90 TABLET | Refills: 3 | Status: SHIPPED | OUTPATIENT
Start: 2023-06-29

## 2023-06-29 RX ORDER — MORPHINE SULFATE 4 MG/ML
4 INJECTION, SOLUTION INTRAMUSCULAR; INTRAVENOUS EVERY 10 MIN PRN
Status: DISCONTINUED | OUTPATIENT
Start: 2023-06-29 | End: 2023-06-29 | Stop reason: HOSPADM

## 2023-06-29 RX ADMIN — PHENYLEPHRINE HCL 100 MCG: 10 MG/ML VIAL (ML) INJECTION at 17:07:00

## 2023-06-29 RX ADMIN — PHENYLEPHRINE HCL 100 MCG: 10 MG/ML VIAL (ML) INJECTION at 16:55:00

## 2023-06-29 RX ADMIN — PHENYLEPHRINE HCL 100 MCG: 10 MG/ML VIAL (ML) INJECTION at 16:50:00

## 2023-06-29 RX ADMIN — SODIUM CHLORIDE, SODIUM LACTATE, POTASSIUM CHLORIDE, CALCIUM CHLORIDE: 600; 310; 30; 20 INJECTION, SOLUTION INTRAVENOUS at 16:17:00

## 2023-06-29 RX ADMIN — PHENYLEPHRINE HCL 100 MCG: 10 MG/ML VIAL (ML) INJECTION at 16:44:00

## 2023-06-29 RX ADMIN — PHENYLEPHRINE HCL 100 MCG: 10 MG/ML VIAL (ML) INJECTION at 17:02:00

## 2023-06-29 RX ADMIN — SODIUM CHLORIDE, SODIUM LACTATE, POTASSIUM CHLORIDE, CALCIUM CHLORIDE: 600; 310; 30; 20 INJECTION, SOLUTION INTRAVENOUS at 17:06:00

## 2023-06-29 RX ADMIN — PHENYLEPHRINE HCL 100 MCG: 10 MG/ML VIAL (ML) INJECTION at 16:39:00

## 2023-06-29 NOTE — PROGRESS NOTES
06/29/23 0910   Wound 06/28/23 Diabetic Ulcer Toe (Comment which one) Left   Date First Assessed/Time First Assessed: 06/28/23 1733   Present on Original Admission: Yes  Primary Wound Type: Diabetic Ulcer  Location: Toe (Comment which one)  Wound Location Orientation: Left  Wound Description (Comments): left great toe, yellow . .. Wound Image     Site Assessment Edema;Fragile; Moist;Red   Closure Not approximated   Drainage Amount Small   Drainage Description Serosanguineous   Treatments Betadine   Dressing 2x2s;Campos   Dressing Changed Changed   Dressing Status Dressing Changed;Removed; Old drainage   Non-staged Wound Description Full thickness   Sharon-wound Assessment Edema; Moist;Maceration   Wound Odor None   Strickland Scale Grade 3   State of Healing Non-healing   Wound Follow Up   Follow up needed Yes       WOUND CARE NOTE    History:  Past Medical History:   Diagnosis Date    Age-related nuclear cataract of both eyes 1/14/2021    Arthritis     Cervical spine disease \"20011\" [PLS VERIFY]    \"cervical spine disease. surgical 3/32668\" per NG     Congestive heart disease ([address]     Diabetes ([address]     Diabetes mellitus ([address]     Diabetes mellitus type 2 with complications ([address]     diabetes mellitus, type 2 with comp.   Insulin    Esophageal reflux     Glaucoma     1/14/21 1st visit with JORGITO- patient has DX of glaucoma and is on Latanoprost qhs OU, Dorzolamide bid OU and Timolol bid OU- being treated at [facility]- seeing RJM for DM EE only     Obesity, unspecified     Other and unspecified hyperlipidemia     Unspecified essential hypertension      Past Surgical History:   Procedure Laterality Date    BACK SURGERY  \"2011\"     \"3/2011 surgical.  cervical spine disease\"     BACK SURGERY      cervical fusion    COLONOSCOPY N/A 11/16/2021    Procedure: COLONOSCOPY;  Surgeon: Alanna Fajardo MD;  Location: [facility] ENDOSCOPY    HC ARTHROCENTESIS OR INJECT INTERMED JOINT W/O US Bilateral     carpal tunnel injections bilaterally. Gonsalo Wang MD    [facility] INJECT SINGLE MULTIPLE TRIGGER POINT 1 OR 2 MUSC      Trigger Point Injections [SITES, # SITES NOT STATED]. provider: Gaurang Erickson    OTHER SURGICAL HISTORY  01/03/2022    IPP, Dr. Akilah Boo Right 6/13    SPINE SURGERY PROCEDURE UNLISTED      c3-c7 cervical fusion       Social History     Socioeconomic History    Marital status:     Number of children: 2   Tobacco Use    Smoking status: Never    Smokeless tobacco: Never   Vaping Use    Vaping Use: Never used   Substance and Sexual Activity    Alcohol use: Yes     Alcohol/week: 0.0 standard drinks of alcohol     Comment: Very rare    Drug use: No    Sexual activity: Not Currently   Other Topics Concern    Caffeine Concern Yes     Comment: rarely     Exercise Yes     Comment: therapy   Social History Narrative    The patient does not use an assistive device. .      The patient does live in a home with stairs. PLAN   Recommendations:  Dr. Vonda Arora currently on consult  Dietary consult for recommendations for nutrition to optimize wound healing  Diabetic Education for optimal glucose control  Glucose control to help promote wound healing    Wound(s)  Location: LEFT GREAT TOE  Topical BETADINE  Dressings DRY GAUZE  Secure with Dorothea Montez MD Consult new  Reason for Consult LEFT TOE      ASSESSMENT   Sanjay Score:  Sanjay Scale Score: 22    Chart Reviewed: yes    Wound(s):  Pt is current with dpm and [facility] wound [facility]. See above for non healing diabetic ulcer. Recommend betadine and dry dressing. Potential plan for amputation later today w Dr. Kendrick Chan.      Allergies: Seasonal    Labs:   Lab Results   Component Value Date    WBC 7.4 06/29/2023    HGB 10.1 (L) 06/29/2023    HCT 31.0 (L) 06/29/2023    .0 06/29/2023    CREATSERUM 1.44 (H) 06/29/2023    BUN 21 (H) 06/29/2023     06/29/2023    K 3.7 06/29/2023     06/29/2023    CO2 24.0 06/29/2023     (H) 06/29/2023    CA 8.6 06/29/2023    ALB 3.6 08/22/2022    ALKPHO 65 08/22/2022    BILT 0.7 08/22/2022    TP 6.8 08/22/2022    AST 11 08/22/2022    ALT 9 08/22/2022     No results found for: PREALBUMIN      Time Spent 30 Minutes.     Jane Deng, RN, BSN, [id number]  [phone]

## 2023-06-29 NOTE — CM/SW NOTE
06/29/23 1300   CM/SW Screening   Referral [id number] staff; Chart review;Nursing rounds   Patient's Current Mental Status at Time of Assessment Alert;Oriented   Patient's Home Environment Condo/Apt no elevator   Number of Levels in Home 3   Number of Stair in Home 4   Patient lives with Alone   Patient Status Prior to Admission   Independent with ADLs and Mobility Yes   Discharge Needs   Anticipated D/C needs   (TBD)     CM met w/patient at bedside. Verified home address from facesheet correct. Patient lives in a basement apt building. Patient has hx of rehab at [facility].      PT/OT to eval after procedure. Pt scheduled for today at 5:30pm for amputation of left great toe. Patient is positive for osteomyelitis. Per nurse call from [facility] pt may need outpatient IV abx. The benefits veriied coverage for office only, not covered for teach and train or Home Health. CM requested department  [facility]) to initiate AIDIN referral for Infusion . SW/CM will continue to follow. CM/SW to remain available for support and/or discharge planning.     Bianca Young RN, [facility]    Ext.  [phone]

## 2023-06-29 NOTE — PLAN OF CARE
Problem: Patient Centered Care  Goal: Patient preferences are identified and integrated in the patient's plan of care  Description: Interventions:  - What would you like us to know as we care for you? From home alone, has glaucoma, diabetes type 2  - Provide timely, complete, and accurate information to patient/family  - Incorporate patient and family knowledge, values, beliefs, and cultural backgrounds into the planning and delivery of care  - Encourage patient/family to participate in care and decision-making at the level they choose  - Honor patient and family perspectives and choices  Outcome: Progressing     Problem: Diabetes/Glucose Control  Goal: Glucose maintained within prescribed range  Description: INTERVENTIONS:  - Monitor Blood Glucose as ordered  - Assess for signs and symptoms of hyperglycemia and hypoglycemia  - Administer ordered medications to maintain glucose within target range  - Assess barriers to adequate nutritional intake and initiate nutrition consult as needed  - Instruct patient on self management of diabetes  Outcome: Progressing     Problem: Patient/Family Goals  Goal: Patient/Family Long Term Goal  Description: Patient's Long Term Goal: to resolve issue with my toe.     Interventions:  - imaging, antibiotics  - See additional Care Plan goals for specific interventions  Outcome: Progressing  Goal: Patient/Family Short Term Goal  Description: Patient's Short Term Goal: Resolve issues with foot    Interventions:   - imaging, antibiotics  - See additional Care Plan goals for specific interventions  Outcome: Progressing     Problem: METABOLIC/FLUID AND ELECTROLYTES - ADULT  Goal: Glucose maintained within prescribed range  Description: INTERVENTIONS:  - Monitor Blood Glucose as ordered  - Assess for signs and symptoms of hyperglycemia and hypoglycemia  - Administer ordered medications to maintain glucose within target range  - Assess barriers to adequate nutritional intake and initiate nutrition consult as needed  - Instruct patient on self management of diabetes  Outcome: Progressing     Problem: SKIN/TISSUE INTEGRITY - ADULT  Goal: Skin integrity remains intact  Description: INTERVENTIONS  - Assess and document risk factors for pressure ulcer development  - Assess and document skin integrity  - Monitor for areas of redness and/or skin breakdown  - Initiate interventions, skin care algorithm/standards of care as needed  Outcome: Progressing  Goal: Incision(s), wounds(s) or drain site(s) healing without S/S of infection  Description: INTERVENTIONS:  - Assess and document risk factors for pressure ulcer development  - Assess and document skin integrity  - Assess and document dressing/incision, wound bed, drain sites and surrounding tissue  - Implement wound care per orders  - Initiate isolation precautions as appropriate  - Initiate Pressure Ulcer prevention bundle as indicated  Outcome: Progressing

## 2023-06-29 NOTE — CM/SW NOTE
Received call from [address] stating pt unruly need outpatient IV abx per Dr Lora Suarez. CM requested department  [facility]) to initiate AIDIN referral for Infusion IV abx. SW/CM will continue to follow. Per nursing rounds pt positive for osteomyelitis. Pt to have at 5:30pm today an amputation of left great toe.

## 2023-06-29 NOTE — OPERATIVE REPORT
Surgeon: Ruby Orellana DPM     Assistant:  None     Pre-op Diagnosis: osteomyelitis left hallux     Post-op Diagnosis: same     Anesthesia: MAC     Injectables:                   Pre-op: 10 mL of 1:1 mix of 1% lidocaine plain: 0.5% marcaine plain                    Hemostasis: none     EBL: < 10 mL     Implants:  none     Sutures:  3-0 nylon     Complications: none     Findings: Infected appearing bone to the left hallux distal phalanx. The proximal phalanx appeared healthy otherwise. A clearance fragment was obtained. The patient was brought into the operating room and placed on the operating room table in a supine position. Patient was secured and well padded. A time out was taken with the anesthesiologist, surgeon, circulating nurse and surgical team and all were in agreement of the patient, procedure and location of the procedure. After adequate IV sedation, the left foot was anesthetized using 10 mL 1:1 mix of 1% lidocaine plain: 0.5% marcaine plain in a 2nd ray block fashion. The left foot was then scrubbed, prepped and draped utilizing proper sterile technique. Attention was directed to the left hallux where a fish mouth incision was made to the level of bone. The left hallux was disarticulated at the level of the IPJ. The distal hallux was sent for pathology and culture. The proximal phalanx left hallux was evaluated and the bone appeared healthy with cartilage intact. A clearance fragment was obtained by using a sagittal saw to resect the head of the proximal phalanx which was sent for path and culture. The flexor and extensor tendon were resected to as proximal as possible. The site was flushed with pulse lavage using vancomycin mixed in saline. The site was closed using 3-0 nylon. The site was cleansed and dried. Dressings applied: xeroform, 4x4, kerlix, ace. The patient tolerated the anesthesia and procedure well and was transferred to the PACU with VSS and NVSI.  Patient will be transferred back to the inpatient floors once medically stable.

## 2023-06-30 LAB
ANION GAP SERPL CALC-SCNC: 7 MMOL/L (ref 0–18)
BASOPHILS # BLD AUTO: 0.04 X10(3) UL (ref 0–0.2)
BASOPHILS NFR BLD AUTO: 0.5 %
BUN BLD-MCNC: 18 MG/DL (ref 7–18)
BUN/CREAT SERPL: 17.6 (ref 10–20)
CALCIUM BLD-MCNC: 8.8 MG/DL (ref 8.5–10.1)
CHLORIDE SERPL-SCNC: 111 MMOL/L (ref 98–112)
CO2 SERPL-SCNC: 26 MMOL/L (ref 21–32)
CREAT BLD-MCNC: 1.02 MG/DL
DEPRECATED RDW RBC AUTO: 41.3 FL (ref 35.1–46.3)
EOSINOPHIL # BLD AUTO: 0.34 X10(3) UL (ref 0–0.7)
EOSINOPHIL NFR BLD AUTO: 4.6 %
ERYTHROCYTE [DISTWIDTH] IN BLOOD BY AUTOMATED COUNT: 13.9 % (ref 11–15)
GFR SERPLBLD BASED ON 1.73 SQ M-ARVRAT: 85 ML/MIN/1.73M2 (ref 60–?)
GLUCOSE BLD-MCNC: 114 MG/DL (ref 70–99)
GLUCOSE BLDC GLUCOMTR-MCNC: 110 MG/DL (ref 70–99)
GLUCOSE BLDC GLUCOMTR-MCNC: 111 MG/DL (ref 70–99)
GLUCOSE BLDC GLUCOMTR-MCNC: 116 MG/DL (ref 70–99)
GLUCOSE BLDC GLUCOMTR-MCNC: 99 MG/DL (ref 70–99)
HCT VFR BLD AUTO: 30.7 %
HGB BLD-MCNC: 10 G/DL
IMM GRANULOCYTES # BLD AUTO: 0.02 X10(3) UL (ref 0–1)
IMM GRANULOCYTES NFR BLD: 0.3 %
LYMPHOCYTES # BLD AUTO: 1.81 X10(3) UL (ref 1–4)
LYMPHOCYTES NFR BLD AUTO: 24.5 %
MCH RBC QN AUTO: 26.5 PG (ref 26–34)
MCHC RBC AUTO-ENTMCNC: 32.6 G/DL (ref 31–37)
MCV RBC AUTO: 81.2 FL
MONOCYTES # BLD AUTO: 0.55 X10(3) UL (ref 0.1–1)
MONOCYTES NFR BLD AUTO: 7.4 %
NEUTROPHILS # BLD AUTO: 4.64 X10 (3) UL (ref 1.5–7.7)
NEUTROPHILS # BLD AUTO: 4.64 X10(3) UL (ref 1.5–7.7)
NEUTROPHILS NFR BLD AUTO: 62.7 %
OSMOLALITY SERPL CALC.SUM OF ELEC: 301 MOSM/KG (ref 275–295)
PLATELET # BLD AUTO: 214 10(3)UL (ref 150–450)
POTASSIUM SERPL-SCNC: 3.3 MMOL/L (ref 3.5–5.1)
RBC # BLD AUTO: 3.78 X10(6)UL
SODIUM SERPL-SCNC: 144 MMOL/L (ref 136–145)
WBC # BLD AUTO: 7.4 X10(3) UL (ref 4–11)

## 2023-06-30 PROCEDURE — 99233 SBSQ HOSP IP/OBS HIGH 50: CPT | Performed by: HOSPITALIST

## 2023-06-30 RX ORDER — BACLOFEN 10 MG/1
10 TABLET ORAL 3 TIMES DAILY
Status: DISCONTINUED | OUTPATIENT
Start: 2023-06-30 | End: 2023-07-02

## 2023-06-30 RX ORDER — POTASSIUM CHLORIDE 20 MEQ/1
40 TABLET, EXTENDED RELEASE ORAL ONCE
Status: COMPLETED | OUTPATIENT
Start: 2023-06-30 | End: 2023-06-30

## 2023-06-30 NOTE — CM/SW NOTE
CM spoke w/ Chayo Hopping ID PA she continue on vancomycin and unasyn. pending cx and pathology. If clear margins, will plan for short course of PO abx on DC. Per RN-oNelle per podiatry may order for PT/OT EVALS. Plan; TBD    CM/SW to remain available for support and/or discharge planning.     Sandro Ayoub RN, [facility]    Ext.  [phone]

## 2023-06-30 NOTE — WOUND PROGRESS NOTE
Pt is s/p great toe amputation w Dr. Crescencio Langston. Per surgical note, wound closed w sutures, xeroform, gauze dressing applied.  Wound care signing off unless requested by DPM.

## 2023-06-30 NOTE — OCCUPATIONAL THERAPY NOTE
Order received and chart reviewed. Attempted to see pt for OT eval today. Confirmed with Podiatry pt's WB status is WB in post-op shoe with pressure to the heel. RN reported pt does not have post-op shoe in the room and reported she would order it. Will re-attempt when pt has post-op shoe available.     Matthew Leiva, OTR/L

## 2023-06-30 NOTE — PLAN OF CARE
Problem: Patient Centered Care  Goal: Patient preferences are identified and integrated in the patient's plan of care  Description: Interventions:  - What would you like us to know as we care for you?  From home alone, has glaucoma, diabetes type 2  - Provide timely, complete, and accurate information to patient/family  - Incorporate patient and family knowledge, values, beliefs, and cultural backgrounds into the planning and delivery of care  - Encourage patient/family to participate in care and decision-making at the level they choose  - Honor patient and family perspectives and choices  6/30/2023 1604 by Steph Murillo RN  Outcome: Progressing  6/30/2023 1604 by Steph Murillo RN  Outcome: Progressing     Problem: Diabetes/Glucose Control  Goal: Glucose maintained within prescribed range  Description: INTERVENTIONS:  - Monitor Blood Glucose as ordered  - Assess for signs and symptoms of hyperglycemia and hypoglycemia  - Administer ordered medications to maintain glucose within target range  - Assess barriers to adequate nutritional intake and initiate nutrition consult as needed  - Instruct patient on self management of diabetes  6/30/2023 1604 by Steph Murillo RN  Outcome: Progressing  6/30/2023 1604 by Steph Murillo RN  Outcome: Progressing     Problem: METABOLIC/FLUID AND ELECTROLYTES - ADULT  Goal: Glucose maintained within prescribed range  Description: INTERVENTIONS:  - Monitor Blood Glucose as ordered  - Assess for signs and symptoms of hyperglycemia and hypoglycemia  - Administer ordered medications to maintain glucose within target range  - Assess barriers to adequate nutritional intake and initiate nutrition consult as needed  - Instruct patient on self management of diabetes  6/30/2023 1604 by Steph Murillo RN  Outcome: Progressing  6/30/2023 1604 by Steph Murillo RN  Outcome: Progressing     Problem: SKIN/TISSUE INTEGRITY - ADULT  Goal: Skin integrity remains intact  Description: INTERVENTIONS  - Assess and document risk factors for pressure ulcer development  - Assess and document skin integrity  - Monitor for areas of redness and/or skin breakdown  - Initiate interventions, skin care algorithm/standards of care as needed  6/30/2023 1604 by Fatuma Baird RN  Outcome: Progressing  6/30/2023 1604 by Fatuma Baird RN  Outcome: Progressing  Goal: Incision(s), wounds(s) or drain site(s) healing without S/S of infection  Description: INTERVENTIONS:  - Assess and document risk factors for pressure ulcer development  - Assess and document skin integrity  - Assess and document dressing/incision, wound bed, drain sites and surrounding tissue  - Implement wound care per orders  - Initiate isolation precautions as appropriate  - Initiate Pressure Ulcer prevention bundle as indicated  6/30/2023 1604 by Fatuma Baird RN  Outcome: Progressing  6/30/2023 1604 by Fatuma Baird RN  Outcome: Progressing  Patient is A/O x4, no complains of pain or discomfort. Surgical dressing intact, no signs of bleeding. Patient continues on IV ampicillin and vancomycin. He has good appetite.

## 2023-07-01 LAB
ANION GAP SERPL CALC-SCNC: 8 MMOL/L (ref 0–18)
BUN BLD-MCNC: 22 MG/DL (ref 7–18)
BUN/CREAT SERPL: 18.8 (ref 10–20)
CALCIUM BLD-MCNC: 8.8 MG/DL (ref 8.5–10.1)
CHLORIDE SERPL-SCNC: 107 MMOL/L (ref 98–112)
CO2 SERPL-SCNC: 26 MMOL/L (ref 21–32)
CREAT BLD-MCNC: 1.17 MG/DL
DEPRECATED RDW RBC AUTO: 43.7 FL (ref 35.1–46.3)
ERYTHROCYTE [DISTWIDTH] IN BLOOD BY AUTOMATED COUNT: 14.2 % (ref 11–15)
GFR SERPLBLD BASED ON 1.73 SQ M-ARVRAT: 72 ML/MIN/1.73M2 (ref 60–?)
GLUCOSE BLD-MCNC: 113 MG/DL (ref 70–99)
GLUCOSE BLDC GLUCOMTR-MCNC: 113 MG/DL (ref 70–99)
GLUCOSE BLDC GLUCOMTR-MCNC: 120 MG/DL (ref 70–99)
GLUCOSE BLDC GLUCOMTR-MCNC: 124 MG/DL (ref 70–99)
GLUCOSE BLDC GLUCOMTR-MCNC: 125 MG/DL (ref 70–99)
HCT VFR BLD AUTO: 31.3 %
HGB BLD-MCNC: 10 G/DL
MCH RBC QN AUTO: 26.9 PG (ref 26–34)
MCHC RBC AUTO-ENTMCNC: 31.9 G/DL (ref 31–37)
MCV RBC AUTO: 84.1 FL
OSMOLALITY SERPL CALC.SUM OF ELEC: 296 MOSM/KG (ref 275–295)
PLATELET # BLD AUTO: 218 10(3)UL (ref 150–450)
POTASSIUM SERPL-SCNC: 3.5 MMOL/L (ref 3.5–5.1)
POTASSIUM SERPL-SCNC: 3.5 MMOL/L (ref 3.5–5.1)
RBC # BLD AUTO: 3.72 X10(6)UL
SODIUM SERPL-SCNC: 141 MMOL/L (ref 136–145)
VANCOMYCIN PEAK SERPL-MCNC: 27.1 UG/ML (ref 30–50)
VANCOMYCIN TROUGH SERPL-MCNC: 17.1 UG/ML (ref 10–20)
WBC # BLD AUTO: 7.3 X10(3) UL (ref 4–11)

## 2023-07-01 PROCEDURE — 99233 SBSQ HOSP IP/OBS HIGH 50: CPT | Performed by: HOSPITALIST

## 2023-07-01 RX ORDER — VANCOMYCIN 1.75 GRAM/500 ML IN 0.9 % SODIUM CHLORIDE INTRAVENOUS
1750 EVERY 24 HOURS
Status: DISCONTINUED | OUTPATIENT
Start: 2023-07-02 | End: 2023-07-02

## 2023-07-01 RX ORDER — CIPROFLOXACIN 500 MG/1
500 TABLET, FILM COATED ORAL
Status: DISCONTINUED | OUTPATIENT
Start: 2023-07-01 | End: 2023-07-02

## 2023-07-01 NOTE — PLAN OF CARE
PRN Norco given for pain. Remote tele with no calls. Podiatry changed dressing today; per MD, not a daily dressing change. Problem: Patient Centered Care  Goal: Patient preferences are identified and integrated in the patient's plan of care  Description: Interventions:  - What would you like us to know as we care for you? From home alone, has glaucoma, diabetes type 2  - Provide timely, complete, and accurate information to patient/family  - Incorporate patient and family knowledge, values, beliefs, and cultural backgrounds into the planning and delivery of care  - Encourage patient/family to participate in care and decision-making at the level they choose  - Honor patient and family perspectives and choices  Outcome: Progressing     Problem: Diabetes/Glucose Control  Goal: Glucose maintained within prescribed range  Description: INTERVENTIONS:  - Monitor Blood Glucose as ordered  - Assess for signs and symptoms of hyperglycemia and hypoglycemia  - Administer ordered medications to maintain glucose within target range  - Assess barriers to adequate nutritional intake and initiate nutrition consult as needed  - Instruct patient on self management of diabetes  Outcome: Progressing     Problem: Patient/Family Goals  Goal: Patient/Family Long Term Goal  Description: Patient's Long Term Goal: to resolve issue with my toe.     Interventions:  - imaging, antibiotics  - See additional Care Plan goals for specific interventions  Outcome: Progressing  Goal: Patient/Family Short Term Goal  Description: Patient's Short Term Goal: Resolve issues with foot    Interventions:   - imaging, antibiotics  - See additional Care Plan goals for specific interventions  Outcome: Progressing     Problem: METABOLIC/FLUID AND ELECTROLYTES - ADULT  Goal: Glucose maintained within prescribed range  Description: INTERVENTIONS:  - Monitor Blood Glucose as ordered  - Assess for signs and symptoms of hyperglycemia and hypoglycemia  - Administer ordered medications to maintain glucose within target range  - Assess barriers to adequate nutritional intake and initiate nutrition consult as needed  - Instruct patient on self management of diabetes  Outcome: Progressing     Problem: SKIN/TISSUE INTEGRITY - ADULT  Goal: Skin integrity remains intact  Description: INTERVENTIONS  - Assess and document risk factors for pressure ulcer development  - Assess and document skin integrity  - Monitor for areas of redness and/or skin breakdown  - Initiate interventions, skin care algorithm/standards of care as needed  Outcome: Not Progressing  Goal: Incision(s), wounds(s) or drain site(s) healing without S/S of infection  Description: INTERVENTIONS:  - Assess and document risk factors for pressure ulcer development  - Assess and document skin integrity  - Assess and document dressing/incision, wound bed, drain sites and surrounding tissue  - Implement wound care per orders  - Initiate isolation precautions as appropriate  - Initiate Pressure Ulcer prevention bundle as indicated  Outcome: Progressing

## 2023-07-01 NOTE — PLAN OF CARE
A/O x4, PRN Norco given for pain overnight. Vital signs stable, no acute changes overnight. IV vancomycin and Unasyn given. Call light within reach, frequent rounding done. Problem: Patient Centered Care  Goal: Patient preferences are identified and integrated in the patient's plan of care  Description: Interventions:  - What would you like us to know as we care for you? From home alone, has glaucoma, diabetes type 2  - Provide timely, complete, and accurate information to patient/family  - Incorporate patient and family knowledge, values, beliefs, and cultural backgrounds into the planning and delivery of care  - Encourage patient/family to participate in care and decision-making at the level they choose  - Honor patient and family perspectives and choices  Outcome: Progressing     Problem: Diabetes/Glucose Control  Goal: Glucose maintained within prescribed range  Description: INTERVENTIONS:  - Monitor Blood Glucose as ordered  - Assess for signs and symptoms of hyperglycemia and hypoglycemia  - Administer ordered medications to maintain glucose within target range  - Assess barriers to adequate nutritional intake and initiate nutrition consult as needed  - Instruct patient on self management of diabetes  Outcome: Progressing     Problem: Patient/Family Goals  Goal: Patient/Family Long Term Goal  Description: Patient's Long Term Goal: to resolve issue with my toe.     Interventions:  - imaging, antibiotics  - See additional Care Plan goals for specific interventions  Outcome: Progressing  Goal: Patient/Family Short Term Goal  Description: Patient's Short Term Goal: Resolve issues with foot    Interventions:   - imaging, antibiotics  - See additional Care Plan goals for specific interventions  Outcome: Progressing     Problem: METABOLIC/FLUID AND ELECTROLYTES - ADULT  Goal: Glucose maintained within prescribed range  Description: INTERVENTIONS:  - Monitor Blood Glucose as ordered  - Assess for signs and symptoms of hyperglycemia and hypoglycemia  - Administer ordered medications to maintain glucose within target range  - Assess barriers to adequate nutritional intake and initiate nutrition consult as needed  - Instruct patient on self management of diabetes  Outcome: Progressing     Problem: SKIN/TISSUE INTEGRITY - ADULT  Goal: Skin integrity remains intact  Description: INTERVENTIONS  - Assess and document risk factors for pressure ulcer development  - Assess and document skin integrity  - Monitor for areas of redness and/or skin breakdown  - Initiate interventions, skin care algorithm/standards of care as needed  Outcome: Progressing  Goal: Incision(s), wounds(s) or drain site(s) healing without S/S of infection  Description: INTERVENTIONS:  - Assess and document risk factors for pressure ulcer development  - Assess and document skin integrity  - Assess and document dressing/incision, wound bed, drain sites and surrounding tissue  - Implement wound care per orders  - Initiate isolation precautions as appropriate  - Initiate Pressure Ulcer prevention bundle as indicated  Outcome: Progressing

## 2023-07-01 NOTE — PHYSICAL THERAPY NOTE
PHYSICAL THERAPY  EVALUATION - INPATIENT    Room Number: 391/949-M  Evaluation Date: 7/1/2023  Presenting Problem: L hallux amputation     Physician Order: PT Eval and Treat    Problem List  Principal Problem:    Toe infection  Active Problems:    History of osteomyelitis      Past Medical History  Past Medical History:   Diagnosis Date    Age-related nuclear cataract of both eyes 1/14/2021    Arthritis     Cervical spine disease \"20011\" [PLS VERIFY]    \"cervical spine disease. surgical 3/34530\" per NG     Congestive heart disease ([address]     Diabetes ([address]     Diabetes mellitus ([address]     Diabetes mellitus type 2 with complications ([address]     diabetes mellitus, type 2 with comp. Insulin    Esophageal reflux     Glaucoma     1/14/21 1st visit with RJM- patient has DX of glaucoma and is on Latanoprost qhs OU, Dorzolamide bid OU and Timolol bid OU- being [facility] at [facility]- seeing RJ for DM EE only     Obesity, unspecified     Other and unspecified hyperlipidemia     Unspecified essential hypertension        Past Surgical History  Past Surgical History:   Procedure Laterality Date    BACK SURGERY  \"2011\"     \"3/2011 surgical.  cervical spine disease\"     BACK SURGERY      cervical fusion    COLONOSCOPY N/A 11/16/2021    Procedure: COLONOSCOPY;  Surgeon: Jeniffer Reyes MD;  Location: [facility] ENDOSCOPY    HC ARTHROCENTESIS OR INJECT INTERMED JOINT W/O US Bilateral     carpal tunnel injections bilaterally. Kike Paulson MD    [facility], [facility]. INJECT SINGLE MULTIPLE TRIGGER POINT 1 OR 2 [facility]      Trigger Point Injections [SITES, # SITES NOT STATED]. provider: Darylene Plan    OTHER SURGICAL HISTORY  01/03/2022    IPP, Dr. Justino Leonard Right 6/13    SPINE SURGERY PROCEDURE UNLISTED      c3-c7 cervical fusion        HOME SITUATION  Home Situation  Type of Home: Apartment  Stairs to Enter : 4  Lives With: Alone  Drives: Yes     Prior Level of [place]: lives in apt alone.   Has brother nearby to help PRN. 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 Indep with all ADLs    SUBJECTIVE  Feeling better    OBJECTIVE  Precautions: Limb alert - left  Fall Risk: Standard fall risk    WEIGHT BEARING RESTRICTION                PAIN ASSESSMENT  Ratin  Location: L foot  Management Techniques: Activity promotion   RANGE OF MOTION AND STRENGTH ASSESSMENT  Upper extremity ROM and strength are within functional limits   Lower extremity ROM is within functional limits except for the following: distal LLE  Lower extremity strength is within functional limits except for the following:  distal LLE    NEUROLOGICAL FINDINGS                      ACTIVITY TOLERANCE                         O2 WALK       AM-PAC '6-Clicks' INPATIENT SHORT FORM - BASIC MOBILITY  How much difficulty does the patient currently have. .. Patient Difficulty: Turning over in bed (including adjusting bedclothes, sheets and blankets)?: None   Patient Difficulty: Sitting down on and standing up from a chair with arms (e.g., wheelchair, bedside commode, etc.): None   Patient Difficulty: Moving from lying on back to sitting on the side of the bed?: None   How much help from another person does the patient currently need. .. Help from Another: Moving to and from a bed to a chair (including a wheelchair)?: A Little   Help from Another: Need to walk in hospital room?: A Little   Help from Another: Climbing 3-5 steps with a railing?: A Little     AM-PAC Score:  Raw Score: 21   Approx Degree of Impairment: 28.97%   Standardized Score (AM-PAC Scale): 50.25   CMS Modifier (G-Code): CJ    FUNCTIONAL ABILITY STATUS  Functional Mobility/Gait Assessment  Gait Assistance: Supervision  Distance (ft): 300  Assistive Device: None  Pattern: Shuffle    Skilled Therapy Provided: Chart reviewed. RN aware. Wore gloves, mask. Patient up in bed. Set up post op shoe for patient. Advised to dispose of old shoe due to infection risk. Sit to stand with CGA.   Walked 300 ft wtihout device with one LOB and supervision/CGA. Returned to bed. Advised to keep foot elevated and use family for support as much as necessary. All needs left within reach. RN aware. Patient End of Session: In bed;Needs met;Call light within reach;RN aware of session/findings;Bracing education provided per handout; All patient questions and concerns addressed    ASSESSMENT   Patient is a 61year old male admitted on 6/28/2023 for L hallux osteomyelitis, s/p amputation. Pertinent comorbidities and personal factors impacting therapy include imbalance, L foot pain. Functional outcome measures completed include AM-PAC. Based on this evaluation, patient's clinical presentation is evolving and overall evaluation complexity is considered moderate. PT Discharge Recommendations: Home    PLAN  Patient has been evaluated and presents with no skilled Physical Therapy needs at this time. Patient discharged from Physical Therapy services. Please re-order if a new functional limitation presents during this admission. GOALS  Patient was able to achieve the following goals . ..     Patient was able to transfer At previous, functional level   Patient able to ambulate on level surfaces At previous, functional level

## 2023-07-02 VITALS
WEIGHT: 244.19 LBS | TEMPERATURE: 99 F | DIASTOLIC BLOOD PRESSURE: 71 MMHG | SYSTOLIC BLOOD PRESSURE: 117 MMHG | RESPIRATION RATE: 14 BRPM | OXYGEN SATURATION: 93 % | HEART RATE: 90 BPM | BODY MASS INDEX: 36 KG/M2

## 2023-07-02 LAB
GLUCOSE BLDC GLUCOMTR-MCNC: 116 MG/DL (ref 70–99)
GLUCOSE BLDC GLUCOMTR-MCNC: 134 MG/DL (ref 70–99)

## 2023-07-02 PROCEDURE — 99239 HOSP IP/OBS DSCHRG MGMT >30: CPT | Performed by: HOSPITALIST

## 2023-07-02 RX ORDER — AMITRIPTYLINE HYDROCHLORIDE 25 MG/1
25 TABLET, FILM COATED ORAL 2 TIMES DAILY
Status: SHIPPED | COMMUNITY
Start: 2023-07-02

## 2023-07-02 RX ORDER — FLUTICASONE PROPIONATE 50 MCG
2 SPRAY, SUSPENSION (ML) NASAL
Refills: 0 | Status: SHIPPED | COMMUNITY
Start: 2023-07-02

## 2023-07-02 RX ORDER — AMOXICILLIN AND CLAVULANATE POTASSIUM 875; 125 MG/1; MG/1
1 TABLET, FILM COATED ORAL 2 TIMES DAILY
Qty: 14 TABLET | Refills: 0 | Status: SHIPPED | OUTPATIENT
Start: 2023-07-02 | End: 2023-07-09

## 2023-07-02 RX ORDER — FUROSEMIDE 40 MG/1
40 TABLET ORAL DAILY
Qty: 180 TABLET | Refills: 1 | Status: SHIPPED | COMMUNITY
Start: 2023-07-02

## 2023-07-02 RX ORDER — HYDROCODONE BITARTRATE AND ACETAMINOPHEN 5; 325 MG/1; MG/1
1 TABLET ORAL EVERY 4 HOURS PRN
Qty: 14 TABLET | Refills: 0 | Status: SHIPPED | OUTPATIENT
Start: 2023-07-02

## 2023-07-02 RX ORDER — INSULIN HUMAN 100 [IU]/ML
INJECTION, SUSPENSION SUBCUTANEOUS
Status: SHIPPED | COMMUNITY
Start: 2023-07-02

## 2023-07-02 RX ORDER — SULFAMETHOXAZOLE AND TRIMETHOPRIM 800; 160 MG/1; MG/1
1 TABLET ORAL 2 TIMES DAILY
Qty: 14 TABLET | Refills: 0 | Status: SHIPPED | OUTPATIENT
Start: 2023-07-02 | End: 2023-07-09

## 2023-07-02 NOTE — PLAN OF CARE
A/O x4, no complaints of pain overnight, no acute changes overnight. Dressing to left toe clean, dry, intact. Pt started on PO Cipro. Call light within reach, frequent rounding done. Problem: Patient Centered Care  Goal: Patient preferences are identified and integrated in the patient's plan of care  Description: Interventions:  - What would you like us to know as we care for you? From home alone, has glaucoma, diabetes type 2  - Provide timely, complete, and accurate information to patient/family  - Incorporate patient and family knowledge, values, beliefs, and cultural backgrounds into the planning and delivery of care  - Encourage patient/family to participate in care and decision-making at the level they choose  - Honor patient and family perspectives and choices  Outcome: Progressing     Problem: Diabetes/Glucose Control  Goal: Glucose maintained within prescribed range  Description: INTERVENTIONS:  - Monitor Blood Glucose as ordered  - Assess for signs and symptoms of hyperglycemia and hypoglycemia  - Administer ordered medications to maintain glucose within target range  - Assess barriers to adequate nutritional intake and initiate nutrition consult as needed  - Instruct patient on self management of diabetes  Outcome: Progressing     Problem: Patient/Family Goals  Goal: Patient/Family Long Term Goal  Description: Patient's Long Term Goal: to resolve issue with my toe.     Interventions:  - imaging, antibiotics  - See additional Care Plan goals for specific interventions  Outcome: Progressing  Goal: Patient/Family Short Term Goal  Description: Patient's Short Term Goal: Resolve issues with foot    Interventions:   - imaging, antibiotics  - See additional Care Plan goals for specific interventions  Outcome: Progressing     Problem: METABOLIC/FLUID AND ELECTROLYTES - ADULT  Goal: Glucose maintained within prescribed range  Description: INTERVENTIONS:  - Monitor Blood Glucose as ordered  - Assess for signs and symptoms of hyperglycemia and hypoglycemia  - Administer ordered medications to maintain glucose within target range  - Assess barriers to adequate nutritional intake and initiate nutrition consult as needed  - Instruct patient on self management of diabetes  Outcome: Progressing     Problem: SKIN/TISSUE INTEGRITY - ADULT  Goal: Skin integrity remains intact  Description: INTERVENTIONS  - Assess and document risk factors for pressure ulcer development  - Assess and document skin integrity  - Monitor for areas of redness and/or skin breakdown  - Initiate interventions, skin care algorithm/standards of care as needed  Outcome: Progressing  Goal: Incision(s), wounds(s) or drain site(s) healing without S/S of infection  Description: INTERVENTIONS:  - Assess and document risk factors for pressure ulcer development  - Assess and document skin integrity  - Assess and document dressing/incision, wound bed, drain sites and surrounding tissue  - Implement wound care per orders  - Initiate isolation precautions as appropriate  - Initiate Pressure Ulcer prevention bundle as indicated  Outcome: Progressing

## 2023-07-02 NOTE — PLAN OF CARE
Problem: Patient Centered Care  Goal: Patient preferences are identified and integrated in the patient's plan of care  Description: Interventions:  - What would you like us to know as we care for you? From home alone, has glaucoma, diabetes type 2  - Provide timely, complete, and accurate information to patient/family  - Incorporate patient and family knowledge, values, beliefs, and cultural backgrounds into the planning and delivery of care  - Encourage patient/family to participate in care and decision-making at the level they choose  - Honor patient and family perspectives and choices  Outcome: Completed     Problem: Diabetes/Glucose Control  Goal: Glucose maintained within prescribed range  Description: INTERVENTIONS:  - Monitor Blood Glucose as ordered  - Assess for signs and symptoms of hyperglycemia and hypoglycemia  - Administer ordered medications to maintain glucose within target range  - Assess barriers to adequate nutritional intake and initiate nutrition consult as needed  - Instruct patient on self management of diabetes  Outcome: Completed     Problem: Patient/Family Goals  Goal: Patient/Family Long Term Goal  Description: Patient's Long Term Goal: to resolve issue with my toe.     Interventions:  - imaging, antibiotics  - See additional Care Plan goals for specific interventions  Outcome: Completed  Goal: Patient/Family Short Term Goal  Description: Patient's Short Term Goal: Resolve issues with foot    Interventions:   - imaging, antibiotics  - See additional Care Plan goals for specific interventions  Outcome: Completed     Problem: METABOLIC/FLUID AND ELECTROLYTES - ADULT  Goal: Glucose maintained within prescribed range  Description: INTERVENTIONS:  - Monitor Blood Glucose as ordered  - Assess for signs and symptoms of hyperglycemia and hypoglycemia  - Administer ordered medications to maintain glucose within target range  - Assess barriers to adequate nutritional intake and initiate nutrition consult as needed  - Instruct patient on self management of diabetes  Outcome: Completed     Problem: SKIN/TISSUE INTEGRITY - ADULT  Goal: Skin integrity remains intact  Description: INTERVENTIONS  - Assess and document risk factors for pressure ulcer development  - Assess and document skin integrity  - Monitor for areas of redness and/or skin breakdown  - Initiate interventions, skin care algorithm/standards of care as needed  Outcome: Completed  Goal: Incision(s), wounds(s) or drain site(s) healing without S/S of infection  Description: INTERVENTIONS:  - Assess and document risk factors for pressure ulcer development  - Assess and document skin integrity  - Assess and document dressing/incision, wound bed, drain sites and surrounding tissue  - Implement wound care per orders  - Initiate isolation precautions as appropriate  - Initiate Pressure Ulcer prevention bundle as indicated  Outcome: Completed

## 2023-07-05 ENCOUNTER — PATIENT OUTREACH (OUTPATIENT)
Dept: CASE MANAGEMENT | Age: 58
End: 2023-07-05

## 2023-07-05 ENCOUNTER — TELEPHONE (OUTPATIENT)
Dept: ENDOCRINOLOGY CLINIC | Facility: CLINIC | Age: 58
End: 2023-07-05

## 2023-07-05 DIAGNOSIS — M86.9 LEFT HALLUX OSTEOMYELITIS (HCC): Primary | ICD-10-CM

## 2023-07-05 DIAGNOSIS — Z02.9 ENCOUNTERS FOR UNSPECIFIED ADMINISTRATIVE PURPOSE: ICD-10-CM

## 2023-07-05 PROCEDURE — 1111F DSCHRG MED/CURRENT MED MERGE: CPT

## 2023-07-05 NOTE — TELEPHONE ENCOUNTER
Received labs patient has not been seen since March 2022  Lilia call him to ask if he will like to FU with us  Can offer first available with Colette/ can add on at my lunch this month  Please let me know  Thanks

## 2023-07-07 RX ORDER — AMLODIPINE BESYLATE 10 MG/1
5 TABLET ORAL 2 TIMES DAILY
Qty: 90 TABLET | Refills: 3 | Status: SHIPPED | OUTPATIENT
Start: 2023-07-07

## 2023-07-07 NOTE — TELEPHONE ENCOUNTER
Refill passed per [address] Gary Reddy protocol.     Requested Prescriptions   Pending Prescriptions Disp Refills    AMLODIPINE 10 MG Oral Tab [Pharmacy Med Name: AMLODIPINE BESYLATE 10MG TABLETS] 90 tablet 1     Sig: TAKE 1/2 TABLET BY MOUTH TWICE DAILY       Hypertensive Medications Protocol Passed - 7/7/2023  1:44 PM        Passed - In person appointment in the past 12 or next 3 months     Recent Outpatient Visits              1 week ago Chronic ulcer of great toe of left foot, unspecified ulcer stage [facility])    [address], [address], [address] Bailey Gannaseem, APRN    Office Visit    3 weeks ago Cellulitis of other specified site    [address], [address], [address] Bailey Gang, APRN    Office Visit    10 months ago Js Velazquez annual wellness visit, initial    [facility], Su Hill, DO    Office Visit    1 year ago Neck pain    [facility], [address], [place] Whitaker, DO    Office Visit    1 year ago History of implantation of penile prosthesis    [facility], [facility] , [place], Su Liz, DO    Office Visit          Future Appointments         Provider Department Appt Notes    In 3 days Bailey Swift, [address], [address] TCM/HFU    In 1 month [facility] [facility] [address] Nodules that were found in my lungs and my abdomen    In 2 months Mariano Munguia, Su Hill, DO [address], [address], [address] [place]               Passed - Last BP reading less than 140/90     BP Readings from Last 1 Encounters:  07/02/23 : 117/71              Passed - CMP or BMP in past 6 months     Recent Results (from the past 4392 hour(s))   Basic Metabolic Panel (8)    Collection Time: 07/01/23  7:02 AM   Result Value Ref Range    Glucose 113 (H) 70 - 99 mg/dL    Sodium 141 136 - 145 mmol/L    Potassium 3.5 3.5 - 5.1 mmol/L    Chloride 107 98 - 112 mmol/L    CO2 26.0 21.0 - 32.0 mmol/L    Anion Gap 8 0 - 18 mmol/L    BUN 22 (H) 7 - 18 mg/dL    Creatinine 1.17 0.70 - 1.30 mg/dL    BUN/CREA Ratio 18.8 10.0 - 20.0    Calcium, Total 8.8 8.5 - 10.1 mg/dL    Calculated Osmolality 296 (H) 275 - 295 mOsm/kg    eGFR-Cr 72 >=60 mL/min/1.73m2     *Note: Due to a large number of results and/or encounters for the requested time period, some results have not been displayed. A complete set of results can be found in Results Review.                Passed - In person appointment or virtual visit in the past 6 months     Recent Outpatient Visits              1 week ago Chronic ulcer of great toe of left foot, unspecified ulcer stage [facility])    [address], [address], [place] Candance Lather, APRN    Office Visit    3 weeks ago Cellulitis of other specified site    [address], [address], [place] Candance Lather, APRN    Office Visit    10 months ago Js Velazquez annual wellness visit, initial    [facility], Mike Coe,     Office Visit    1 year ago Neck pain    [facility], [address], [place] Linda Vincent, DO    Office Visit    1 year ago History of implantation of penile prosthesis    [facility] [place], Mike Dunbar, DO    Office Visit          Future Appointments         Provider Department Appt Notes    In 3 days Candance Lather, Daryel Aran, [place] TCM/HFU    In 1 month [address] [facility] [address] Nodules that were found in my lungs and my abdomen    In 2 months Maxine Magallon DO [address], [address], [address] or GFRAA > 50     GFR Evaluation  EGFRCR: 72 , resulted on 7/1/2023             Future Appointments         Provider Department Appt Notes    In 3 days Candance Lather, [address] [place] [address] Group, [address], [address] TCM/HFU    In 1 month [address] [facility] [address] Nodules that were found in my lungs and my abdomen    In 2 months Jay Maciel, DO [address], [address], [address] [place]          Recent Outpatient Visits              1 week ago Chronic ulcer of great toe of left foot, unspecified ulcer stage [facility])    [address], [address], [address] Logan Winkler, APRN    Office Visit    3 weeks ago Cellulitis of other specified site    [address], [address], [address] Logan Winkler, APRN    Office Visit    10 months ago Js Velazquez annual wellness visit, initial    [facility] Bucio, [place],     Office Visit    1 year ago [facility]ck pain    [facility], [address], Levi Patel, DO    Office Visit    1 year ago History of implantation of penile prosthesis    [facility], [facility], [place], [place]    Office Visit

## 2023-07-10 ENCOUNTER — OFFICE VISIT (OUTPATIENT)
Dept: FAMILY MEDICINE CLINIC | Facility: CLINIC | Age: 58
End: 2023-07-10

## 2023-07-10 VITALS
HEART RATE: 100 BPM | WEIGHT: 247 LBS | BODY MASS INDEX: 36.58 KG/M2 | HEIGHT: 69 IN | SYSTOLIC BLOOD PRESSURE: 116 MMHG | TEMPERATURE: 98 F | OXYGEN SATURATION: 97 % | DIASTOLIC BLOOD PRESSURE: 64 MMHG

## 2023-07-10 DIAGNOSIS — Z09 FOLLOW-UP EXAM: ICD-10-CM

## 2023-07-10 DIAGNOSIS — E11.9 TYPE 2 DIABETES MELLITUS WITHOUT COMPLICATION, WITHOUT LONG-TERM CURRENT USE OF INSULIN (HCC): ICD-10-CM

## 2023-07-10 DIAGNOSIS — M86.9 LEFT HALLUX OSTEOMYELITIS (HCC): Primary | ICD-10-CM

## 2023-07-10 PROCEDURE — 3008F BODY MASS INDEX DOCD: CPT

## 2023-07-10 PROCEDURE — 3078F DIAST BP <80 MM HG: CPT

## 2023-07-10 PROCEDURE — 99496 TRANSJ CARE MGMT HIGH F2F 7D: CPT

## 2023-07-10 PROCEDURE — 3074F SYST BP LT 130 MM HG: CPT

## 2023-07-10 NOTE — TELEPHONE ENCOUNTER
Dr. Cristal Verdin, [place] Alex)  Spoke to patient  - he stated he would like to f/u with clinic - patient scheduled with Danilo Lorenzo on 7/31/23 in OP  Thanks

## 2023-07-11 ENCOUNTER — APPOINTMENT (OUTPATIENT)
Dept: WOUND CARE | Facility: HOSPITAL | Age: 58
End: 2023-07-11
Payer: MEDICAID

## 2023-07-31 ENCOUNTER — OFFICE VISIT (OUTPATIENT)
Dept: ENDOCRINOLOGY CLINIC | Facility: CLINIC | Age: 58
End: 2023-07-31

## 2023-07-31 VITALS
WEIGHT: 246 LBS | HEART RATE: 94 BPM | SYSTOLIC BLOOD PRESSURE: 107 MMHG | BODY MASS INDEX: 36.43 KG/M2 | HEIGHT: 69 IN | DIASTOLIC BLOOD PRESSURE: 68 MMHG

## 2023-07-31 DIAGNOSIS — E11.65 UNCONTROLLED TYPE 2 DIABETES MELLITUS WITH HYPERGLYCEMIA (HCC): Primary | ICD-10-CM

## 2023-07-31 LAB
GLUCOSE BLOOD: 200
TEST STRIP LOT #: NORMAL NUMERIC

## 2023-07-31 PROCEDURE — 3078F DIAST BP <80 MM HG: CPT

## 2023-07-31 PROCEDURE — 3074F SYST BP LT 130 MM HG: CPT

## 2023-07-31 PROCEDURE — 3008F BODY MASS INDEX DOCD: CPT

## 2023-07-31 PROCEDURE — 82947 ASSAY GLUCOSE BLOOD QUANT: CPT

## 2023-07-31 PROCEDURE — 99214 OFFICE O/P EST MOD 30 MIN: CPT

## 2023-07-31 NOTE — PROGRESS NOTES
Name: Cedric Montero  Date: 7/31/2023    Referring Physician: No ref. provider found    HISTORY OF PRESENT ILLNESS   Cedric Montero is a 61year old male who presents for follow up for diabetes mellitus. Has followed with Dr. Joey Patel in the past. Was last seen 3/2022 but has been lost to follow up for >1 year. In the last 2 months he was hospitalized with ulcer to L foot and osteomyelitis. No s/p amputation of L hallux. He is still following with podiatry but wounds are healed and no further persistent infection at this time. Reports sugars have been stable at home. Diabetes History:  Diagnosed-2000    Prior glycohemoglobin were: 6.7% 6/2023  Glucose in clinic today - 200 mg/dl     Dietary compliance: Fair- avoids carbohydrates in the diet- snacks on nuts and trail mix, fruit. Avoids sweets and large portions of starchy foods. Exercise: Yes- walks frequently for job, doing some weight lifting at home as well. Polyuria/polydipsia: No  Blurred vision: No    Episodes of hypoglycemia: No- lowest sugar recently 99   Blood Glucose:  Checking 1-2 times per day    Fasting- 's    Current DM Regimen:  70/30 insulin- takes when sugar is >200. Infrequently needing. Takes between 10-15 units if needed for sugars >200. Ozempic- 1mg subcutaneous weekly- adherent with medication. Modifying factors:  Medication adherence: Yes   Recent steroids, illness or infections: No       REVIEW OF SYSTEMS  Eyes: Diabetic retinopathy present: Denies             Most recent visit to eye doctor in last 12 months: Yes -10/2022 had surgery with optho.      CV: Cardiovascular disease present: No         Hypertension present: Yes         Hyperlipidemia present: Yes         Peripheral Vascular Disease present: Yes    : Nephropathy present: Yes    Neuro: Neuropathy present: No- some     Skin: Infection or ulceration: Yes    Osteoporosis: No    Thyroid disease: No      Medications:     Current Outpatient Medications: semaglutide (OZEMPIC, 1 MG/DOSE,) 4 MG/3ML Subcutaneous Solution Pen-injector, Inject 1 mg into the skin once a week., Disp: 9 mL, Rfl: 1    Glucose Blood (ONETOUCH ULTRA) In Vitro Strip, TEST BLOOD SUGAR THREE TIMES DAILY AND ONCE AT NIGHT, Disp: 300 strip, Rfl: 3    amLODIPine 10 MG Oral Tab, Take 0.5 tablets (5 mg total) by mouth 2 (two) times daily. , Disp: 90 tablet, Rfl: 3    insulin NPH & regular 70/30 (HUMULIN 70/30) (70-30) 100 Units/mL Subcutaneous Suspension, daily as needed. , Disp: , Rfl:     amitriptyline 25 MG Oral Tab, Take 1 tablet (25 mg total) by mouth in the morning and 1 tablet (25 mg total) before bedtime. , Disp: , Rfl:     fluticasone propionate 50 MCG/ACT Nasal Suspension, 2 sprays by Nasal route daily as needed (CONGESTION). , Disp: , Rfl: 0    furosemide 40 MG Oral Tab, Take 1 tablet (40 mg total) by mouth daily. , Disp: 180 tablet, Rfl: 1    HYDROcodone-acetaminophen 5-325 MG Oral Tab, Take 1 tablet by mouth every 4 (four) hours as needed for Pain., Disp: 14 tablet, Rfl: 0    atorvastatin 40 MG Oral Tab, Take 1 tablet (40 mg total) by mouth nightly., Disp: 90 tablet, Rfl: 3    baclofen 10 MG Oral Tab, Take 1 tablet (10 mg total) by mouth 3 (three) times daily. , Disp: 270 tablet, Rfl: 3    brimonidine 0.2 % Ophthalmic Solution, Place 1 drop into both eyes 2 (two) times daily. , Disp: , Rfl:     METOLAZONE 5 MG Oral Tab, TAKE 1 TABLET(5 MG) BY MOUTH DAILY (Patient taking differently: Take 1 tablet (5 mg total) by mouth at bedtime.), Disp: 90 tablet, Rfl: 1    INDOMETHACIN 50 MG Oral Cap, TAKE 1 CAPSULE(50 MG) BY MOUTH TWICE DAILY WITH MEALS, Disp: 180 capsule, Rfl: 1    CARVEDILOL 12.5 MG Oral Tab, TAKE 1 TABLET BY MOUTH TWICE DAILY WITH MEALS, Disp: 180 tablet, Rfl: 1    MONTELUKAST 10 MG Oral Tab, TAKE 1 TABLET(10 MG) BY MOUTH EVERY NIGHT, Disp: 90 tablet, Rfl: 1    BENAZEPRIL-HYDROCHLOROTHIAZIDE 20-12.5 MG Oral Tab, TAKE 1 TABLET BY MOUTH DAILY, Disp: 90 tablet, Rfl: 1    Potassium Chloride ER 8 MEQ Oral Tab CR, Take 2 tablets (16 mEq total) by mouth 2 (two) times daily. , Disp: 360 tablet, Rfl: 2    LATANOPROST 0.005 % Ophthalmic Solution, INSTILL 1 DROP IN BOTH EYES DAILY, Disp: 10 mL, Rfl: 2    tamsulosin (FLOMAX) cap, TAKE 1 CAPSULE(0.4 MG) BY MOUTH DAILY, Disp: 90 capsule, Rfl: 3    DORZOLAMIDE HCL 2 % Ophthalmic Solution, INSTILL 1 DROP IN BOTH EYES TWICE DAILY, Disp: 10 mL, Rfl: 6    Vitamin D3, Cholecalciferol, 10 MCG (400 UNIT) Oral Tab, Take 1 tablet (400 Units total) by mouth daily. , Disp: , Rfl:     Insulin Syringe 31G X 5/16\" 0.5 ML Does not apply Misc, AS DIRECTED TWICE DAILY, Disp: 100 each, Rfl: 5    Vitamin C 500 MG Oral Tab, Take 1 tablet (500 mg total) by mouth daily. , Disp: , Rfl:     TRUEPLUS LANCETS 33G Does not apply Misc, Check sugars 2-3 times a day., Disp: 100 each, Rfl: 3     Allergies:     Seasonal                    Social History:   Social History    Socioeconomic History      Marital status:       Number of children: 2    Tobacco Use      Smoking status: Never      Smokeless tobacco: Never    Vaping Use      Vaping Use: Never used    Substance and Sexual Activity      Alcohol use: Yes        Alcohol/week: 0.0 standard drinks of alcohol        Comment: Very rare      Drug use: No      Sexual activity: Not Currently    Other Topics      Concerns:        Caffeine Concern: Yes          rarely         Exercise: Yes          therapy      Medical History:   Past Medical History:   Diagnosis Date    Age-related nuclear cataract of both eyes 1/14/2021    Arthritis     Cervical spine disease \"20011\" [PLS VERIFY]    \"cervical spine disease. surgical 3/58611\" per NG     Congestive heart disease ([address]     Diabetes ([address]     Diabetes mellitus ([address]     Diabetes mellitus type 2 with complications ([address]     diabetes mellitus, type 2 with comp.   Insulin    Esophageal reflux     Glaucoma     1/14/21 1st visit with JORGITO- patient has DX of glaucoma and is on Latanoprost qhs OU, Dorzolamide bid OU and Timolol bid OU- being treated at [facility]- seeing RJM for DM EE only     Obesity, unspecified     Other and unspecified hyperlipidemia     Unspecified essential hypertension        Surgical history:   Past Surgical History:   Procedure Laterality Date    BACK SURGERY  \"2011\"     \"3/2011 surgical.  cervical spine disease\"     BACK SURGERY      cervical fusion    COLONOSCOPY N/A 11/16/2021    Procedure: COLONOSCOPY;  Surgeon: Ziggy Khan MD;  Location: [facility] ENDOSCOPY    HC ARTHROCENTESIS OR INJECT INTERMED JOINT W/O US Bilateral     carpal tunnel injections bilaterally. Natan Stout MD    [facility], [facility]. INJECT SINGLE MULTIPLE TRIGGER POINT 1 OR 2 MUSC      Trigger Point Injections [SITES, # SITES NOT STATED]. provider: [place]    OTHER SURGICAL HISTORY  01/03/2022    IPP, Dr. Shira José Right 6/13    SPINE SURGERY PROCEDURE UNLISTED      c3-c7 cervical fusion        PHYSICAL EXAM   07/31/23  1158   BP: 107/68   Pulse: 94   Weight: 246 lb (111.6 kg)   Height: 5' 9\" (1.753 m)       General Appearance:  alert, well developed, in no acute distress  Eyes:  normal conjunctivae, sclera, and normal pupils  Neck: Trachea midline: Normal  Back: no kyphosis or back tenderness  Lymph Nodes:  No abnormal nodes noted  Musculoskeletal:  normal muscle strength and tone  Skin:  normal moisture and skin texture  Hair & Nails:  normal scalp hair     Hematologic:  no excessive bruising  Neuro:  sensory grossly intact and motor grossly intac. Psychiatric:  oriented to time, self, and place  Nutritional:  no abnormal weight gain or loss      ASSESSMENT/PLAN:    Diabetes mellitus type 2   -HgA1c- 6.7% - stable and at goal   -Reviewed ABC's of diabetes   - Reviewed pathogenesis of diabetes.    - Reviewed importance of good glycemic control to prevent microvascular and macrovascular complications including nephropathy, neuropathy, retinopathy, and cardiovascular disease.  - Reviewed importance of SBGM- check glucose 3 times  daily   - Reviewed target glucose goals for patient  fasting and <180 post prandially   - Reviewed importance of following diabetic diet- recommended 135 grams of CHO per day or 45 grams per meal.   - Provided patient education materials    - Continue with 70/30 insulin as needed when sugars are >200. He is infrequently using insulin.     -Continue Ozempic 1mg subcutaneous weekly. Discussed increasing dose and stopping 70/30 insulin since rarely needing but currently shortage of higher dose of Ozempic so will hold off for now.  Reviewed side effects and risks vs benefits of medication.     -Still following with podiatry- has appt this month   -UTD with optho   -normotensive  -Lipids at goal 6/2023- LDL- 61, on statin.   -+ nephropathy-6/2023- reviewed importance of continued glycemic control - on ACE inhibitor  - Reviewed daily foot care and foot exam- continue close follow up with podiatry    RTC in 4 months   7/31/2023  ALFREDO Torres

## 2023-08-03 ENCOUNTER — TELEPHONE (OUTPATIENT)
Dept: ENDOCRINOLOGY CLINIC | Facility: CLINIC | Age: 58
End: 2023-08-03

## 2023-08-03 NOTE — TELEPHONE ENCOUNTER
Received fax from [phone] attached is a physician form from Diabetic shoes form placed in provider folder for review and signature.

## 2023-08-09 ENCOUNTER — HOSPITAL ENCOUNTER (OUTPATIENT)
Dept: CT IMAGING | Facility: HOSPITAL | Age: 58
Discharge: HOME | End: 2023-08-09
Attending: INTERNAL MEDICINE
Payer: MEDICARE

## 2023-08-09 DIAGNOSIS — R91.8 LUNG NODULES: ICD-10-CM

## 2023-08-09 PROCEDURE — 71250 CT THORAX DX C-: CPT | Performed by: INTERNAL MEDICINE

## 2023-08-17 ENCOUNTER — TELEPHONE (OUTPATIENT)
Dept: PULMONOLOGY | Facility: CLINIC | Age: 58
End: 2023-08-17

## 2023-08-17 NOTE — TELEPHONE ENCOUNTER
----- Message from Abilio Roque DO sent at 8/16/2023  1:56 PM CDT -----  You may let the patient know that reviewed CT chest results with stable appearance of previously seen lung nodules. From pulmonary perspective does not need follow-up chest given stable appearance of nodules for 2 years duration.

## 2023-08-18 NOTE — TELEPHONE ENCOUNTER
Left message on patient's voicemail that [employer] message will be sent. [employer] message sent to patient with Dr. Nathalie Benoit result  note.

## 2023-08-22 ENCOUNTER — HOSPITAL ENCOUNTER (OUTPATIENT)
Dept: ULTRASOUND IMAGING | Age: 58
Discharge: HOME | End: 2023-08-22
Payer: MEDICARE

## 2023-08-22 DIAGNOSIS — E11.65 UNCONTROLLED TYPE 2 DIABETES MELLITUS WITH HYPERGLYCEMIA (HCC): ICD-10-CM

## 2023-08-22 PROCEDURE — 76536 US EXAM OF HEAD AND NECK: CPT

## 2023-08-31 ENCOUNTER — ORDER TRANSCRIPTION (OUTPATIENT)
Dept: ADMINISTRATIVE | Facility: HOSPITAL | Age: 58
End: 2023-08-31

## 2023-08-31 DIAGNOSIS — R20.2 NUMBNESS AND TINGLING OF BOTH UPPER EXTREMITIES: ICD-10-CM

## 2023-08-31 DIAGNOSIS — R29.898 LEFT HAND WEAKNESS: ICD-10-CM

## 2023-08-31 DIAGNOSIS — M54.2 NECK PAIN: Primary | ICD-10-CM

## 2023-08-31 DIAGNOSIS — R20.0 NUMBNESS AND TINGLING OF BOTH UPPER EXTREMITIES: ICD-10-CM

## 2023-09-08 ENCOUNTER — LAB ENCOUNTER (OUTPATIENT)
Dept: LAB | Age: 58
End: 2023-09-08
Attending: FAMILY MEDICINE
Payer: MEDICARE

## 2023-09-08 ENCOUNTER — OFFICE VISIT (OUTPATIENT)
Dept: FAMILY MEDICINE CLINIC | Facility: CLINIC | Age: 58
End: 2023-09-08

## 2023-09-08 VITALS
HEART RATE: 70 BPM | TEMPERATURE: 98 F | SYSTOLIC BLOOD PRESSURE: 118 MMHG | DIASTOLIC BLOOD PRESSURE: 76 MMHG | BODY MASS INDEX: 36.14 KG/M2 | HEIGHT: 69 IN | WEIGHT: 244 LBS

## 2023-09-08 DIAGNOSIS — G56.12 MEDIAN NERVE DYSFUNCTION, LEFT: ICD-10-CM

## 2023-09-08 DIAGNOSIS — Z09 HOSPITAL DISCHARGE FOLLOW-UP: ICD-10-CM

## 2023-09-08 DIAGNOSIS — M54.12 CERVICAL RADICULITIS: ICD-10-CM

## 2023-09-08 DIAGNOSIS — C61 PROSTATE CANCER (HCC): ICD-10-CM

## 2023-09-08 DIAGNOSIS — Z85.46 PERSONAL HISTORY OF PROSTATE CANCER: Primary | ICD-10-CM

## 2023-09-08 DIAGNOSIS — E11.21 TYPE 2 DIABETES MELLITUS WITH DIABETIC NEPHROPATHY, WITHOUT LONG-TERM CURRENT USE OF INSULIN (HCC): ICD-10-CM

## 2023-09-08 DIAGNOSIS — I10 ESSENTIAL HYPERTENSION: ICD-10-CM

## 2023-09-08 RX ORDER — TAMSULOSIN HYDROCHLORIDE 0.4 MG/1
0.4 CAPSULE ORAL DAILY
Qty: 90 CAPSULE | Refills: 1 | Status: SHIPPED | OUTPATIENT
Start: 2023-09-08

## 2023-09-08 NOTE — PATIENT INSTRUCTIONS
Medication reviewed and renewed where needed and appropriate. Comply with medications. Monitor blood pressures and record at home. Limit salt intake. Recommend weight loss via daily exercising and consistent healthy dietary changes. Encouraged safe physical fitness and daily physical activity daily. EMG to be done in October 2023 which will be very informative regarding carpal tunnel induced median nerve compression versus cervical brachial plexus to the left. CMP ordered.

## 2023-09-09 LAB
ALBUMIN/GLOBULIN RATIO: 1.5 (CALC) (ref 1–2.5)
ALBUMIN: 4 G/DL (ref 3.6–5.1)
ALKALINE PHOSPHATASE: 55 U/L (ref 35–144)
ALT: 16 U/L (ref 9–46)
AST: 16 U/L (ref 10–35)
BILIRUBIN, TOTAL: 0.6 MG/DL (ref 0.2–1.2)
BUN: 23 MG/DL (ref 7–25)
CALCIUM: 9.3 MG/DL (ref 8.6–10.3)
CARBON DIOXIDE: 25 MMOL/L (ref 20–32)
CHLORIDE: 107 MMOL/L (ref 98–110)
CREATININE: 1.26 MG/DL (ref 0.7–1.3)
EGFR: 66 ML/MIN/1.73M2
GLOBULIN: 2.7 G/DL (CALC) (ref 1.9–3.7)
GLUCOSE: 108 MG/DL (ref 65–99)
POTASSIUM: 3.7 MMOL/L (ref 3.5–5.3)
PROTEIN, TOTAL: 6.7 G/DL (ref 6.1–8.1)
SODIUM: 139 MMOL/L (ref 135–146)

## 2023-09-15 ENCOUNTER — TELEPHONE (OUTPATIENT)
Dept: ENDOCRINOLOGY CLINIC | Facility: CLINIC | Age: 58
End: 2023-09-15

## 2023-10-03 ENCOUNTER — LAB ENCOUNTER (OUTPATIENT)
Dept: LAB | Age: 58
End: 2023-10-03
Attending: FAMILY MEDICINE
Payer: MEDICARE

## 2023-10-03 ENCOUNTER — OFFICE VISIT (OUTPATIENT)
Dept: FAMILY MEDICINE CLINIC | Facility: CLINIC | Age: 58
End: 2023-10-03

## 2023-10-03 VITALS
WEIGHT: 255 LBS | HEIGHT: 69 IN | TEMPERATURE: 98 F | DIASTOLIC BLOOD PRESSURE: 82 MMHG | BODY MASS INDEX: 37.77 KG/M2 | OXYGEN SATURATION: 98 % | HEART RATE: 80 BPM | SYSTOLIC BLOOD PRESSURE: 134 MMHG

## 2023-10-03 DIAGNOSIS — I10 ESSENTIAL HYPERTENSION: ICD-10-CM

## 2023-10-03 DIAGNOSIS — Z28.21 TETANUS, DIPHTHERIA, AND ACELLULAR PERTUSSIS (TDAP) VACCINATION DECLINED: ICD-10-CM

## 2023-10-03 DIAGNOSIS — Z79.4 CONTROLLED TYPE 2 DIABETES MELLITUS WITH RIGHT EYE AFFECTED BY MILD NONPROLIFERATIVE RETINOPATHY WITHOUT MACULAR EDEMA, WITH LONG-TERM CURRENT USE OF INSULIN (HCC): ICD-10-CM

## 2023-10-03 DIAGNOSIS — E11.3291 CONTROLLED TYPE 2 DIABETES MELLITUS WITH RIGHT EYE AFFECTED BY MILD NONPROLIFERATIVE RETINOPATHY WITHOUT MACULAR EDEMA, WITH LONG-TERM CURRENT USE OF INSULIN (HCC): ICD-10-CM

## 2023-10-03 DIAGNOSIS — Z28.21 PNEUMOCOCCAL VACCINATION DECLINED BY PATIENT: ICD-10-CM

## 2023-10-03 DIAGNOSIS — Z85.46 PERSONAL HISTORY OF PROSTATE CANCER: ICD-10-CM

## 2023-10-03 DIAGNOSIS — Z28.21 INFLUENZA VACCINATION DECLINED BY PATIENT: ICD-10-CM

## 2023-10-03 DIAGNOSIS — Z00.00 MEDICARE ANNUAL WELLNESS VISIT, INITIAL: Primary | ICD-10-CM

## 2023-10-03 DIAGNOSIS — I42.9 CARDIOMYOPATHY, UNSPECIFIED TYPE (HCC): ICD-10-CM

## 2023-10-03 NOTE — PATIENT INSTRUCTIONS
All adult screening ordered and done appropriate for patient's age and gender and risk factors and complaints. Medication reviewed and renewed where needed and appropriate. Comply with medications. Monitor blood pressures and record at home. Limit salt intake. Encouraged safe physical fitness and daily physical activity daily. Recommend weight loss via daily exercising and consistent healthy dietary changes.

## 2023-10-04 ENCOUNTER — TELEPHONE (OUTPATIENT)
Dept: ENDOCRINOLOGY CLINIC | Facility: CLINIC | Age: 58
End: 2023-10-04

## 2023-10-04 DIAGNOSIS — M54.2 CERVICALGIA: ICD-10-CM

## 2023-10-04 LAB
ALBUMIN/GLOBULIN RATIO: 1.4 (CALC) (ref 1–2.5)
ALBUMIN: 3.6 G/DL (ref 3.6–5.1)
ALKALINE PHOSPHATASE: 46 U/L (ref 35–144)
ALT: 21 U/L (ref 9–46)
AST: 19 U/L (ref 10–35)
BILIRUBIN, TOTAL: 0.9 MG/DL (ref 0.2–1.2)
BUN: 14 MG/DL (ref 7–25)
CALCIUM: 8.7 MG/DL (ref 8.6–10.3)
CARBON DIOXIDE: 29 MMOL/L (ref 20–32)
CHLORIDE: 107 MMOL/L (ref 98–110)
CREATININE: 1.13 MG/DL (ref 0.7–1.3)
EGFR: 75 ML/MIN/1.73M2
GLOBULIN: 2.5 G/DL (CALC) (ref 1.9–3.7)
GLUCOSE: 117 MG/DL (ref 65–99)
HEMOGLOBIN A1C: 5.5 % OF TOTAL HGB
POTASSIUM: 3.7 MMOL/L (ref 3.5–5.3)
PROTEIN, TOTAL: 6.1 G/DL (ref 6.1–8.1)
SODIUM: 143 MMOL/L (ref 135–146)
TOTAL PSA: <0.1 NG/ML

## 2023-10-04 NOTE — TELEPHONE ENCOUNTER
Patient's A1c is 5.5 %   He is on insulin and ozempic  Please confirm regimen since I have not seen him in a long time  Also, please check and make sure if he not gaving low Bg under 80    Thanks

## 2023-10-06 ENCOUNTER — TELEPHONE (OUTPATIENT)
Dept: FAMILY MEDICINE CLINIC | Facility: CLINIC | Age: 58
End: 2023-10-06

## 2023-10-06 RX ORDER — AMITRIPTYLINE HYDROCHLORIDE 25 MG/1
25 TABLET, FILM COATED ORAL 2 TIMES DAILY
Qty: 180 TABLET | Refills: 0 | Status: SHIPPED | OUTPATIENT
Start: 2023-10-06 | End: 2024-01-04

## 2023-10-06 RX ORDER — AMITRIPTYLINE HYDROCHLORIDE 25 MG/1
25 TABLET, FILM COATED ORAL DAILY
Qty: 90 TABLET | Refills: 3 | OUTPATIENT
Start: 2023-10-06

## 2023-10-06 NOTE — TELEPHONE ENCOUNTER
[employer] message sent to pt, await reply.        Please review refill protocol failed/ no protocol  Requested Prescriptions   Pending Prescriptions Disp Refills    AMITRIPTYLINE 25 MG Oral Tab [Pharmacy Med Name: AMITRIPTYLINE 25MG TABLETS] 90 tablet 0     Sig: TAKE 1 TABLET(25 MG) BY MOUTH DAILY       There is no refill protocol information for this order

## 2023-10-07 NOTE — PROGRESS NOTES
Subjective:     Patient ID: Joyce Rizzo is a 61year old male. This patient is a 48-year-old prostate CA surviving (remission), hypertensive, diabetic, cardiomyopathy, status post left great toe amputation African-American male here for Medicare annual visit which will also satisfy all the requirements for a complete preventive care physical and for status update on any confirmed chronic medical illnesses and follow up on any previous labs or procedures that were suggestive or in need of further work up. Colonoscopy is current. Bowel, bladder, and sexual function are intact. Patient denies headaches, chest pain, dizziness, shortness of breath, visual changes, urinary frequency, exertional fatigue, excessive hunger or thirst.    Patient eligible for pneumococcal, Tdap, and influenza vaccine, but denies them all. Patient does have an external ophthalmologist and according to the patient has been seen within last 10 to 12 months. History/Other:   Review of Systems  Current Outpatient Medications   Medication Sig Dispense Refill    tamsulosin 0.4 MG Oral Cap Take 1 capsule (0.4 mg total) by mouth daily. 90 capsule 1    Glucose Blood (ONETOUCH ULTRA) In Vitro Strip TEST BLOOD SUGAR THREE TIMES DAILY AND ONCE AT NIGHT 300 strip 3    amitriptyline 25 MG Oral Tab Take 1 tablet (25 mg total) by mouth in the morning and 1 tablet (25 mg total) before bedtime. atorvastatin 40 MG Oral Tab Take 1 tablet (40 mg total) by mouth nightly. 90 tablet 3    baclofen 10 MG Oral Tab Take 1 tablet (10 mg total) by mouth 3 (three) times daily. 270 tablet 3    brimonidine 0.2 % Ophthalmic Solution Place 1 drop into both eyes 2 (two) times daily.       CARVEDILOL 12.5 MG Oral Tab TAKE 1 TABLET BY MOUTH TWICE DAILY WITH MEALS 180 tablet 1    MONTELUKAST 10 MG Oral Tab TAKE 1 TABLET(10 MG) BY MOUTH EVERY NIGHT 90 tablet 1    LATANOPROST 0.005 % Ophthalmic Solution INSTILL 1 DROP IN BOTH EYES DAILY 10 mL 2    DORZOLAMIDE HCL 2 % Ophthalmic Solution INSTILL 1 DROP IN BOTH EYES TWICE DAILY 10 mL 6    Vitamin D3, Cholecalciferol, 10 MCG (400 UNIT) Oral Tab Take 1 tablet (400 Units total) by mouth daily. Vitamin C 500 MG Oral Tab Take 1 tablet (500 mg total) by mouth daily. TRUEPLUS LANCETS 33G Does not apply Misc Check sugars 2-3 times a day. 100 each 3    amitriptyline 25 MG Oral Tab Take 1 tablet (25 mg total) by mouth in the morning and 1 tablet (25 mg total) before bedtime. 180 tablet 0    amLODIPine 10 MG Oral Tab Take 0.5 tablets (5 mg total) by mouth 2 (two) times daily. (Patient not taking: Reported on 9/8/2023) 90 tablet 3    insulin NPH & regular 70/30 (HUMULIN 70/30) (70-30) 100 Units/mL Subcutaneous Suspension daily as needed. (Patient not taking: Reported on 9/8/2023)      fluticasone propionate 50 MCG/ACT Nasal Suspension 2 sprays by Nasal route daily as needed (CONGESTION). (Patient not taking: Reported on 9/8/2023)  0    furosemide 40 MG Oral Tab Take 1 tablet (40 mg total) by mouth daily. (Patient not taking: Reported on 9/8/2023) 180 tablet 1    HYDROcodone-acetaminophen 5-325 MG Oral Tab Take 1 tablet by mouth every 4 (four) hours as needed for Pain. (Patient not taking: Reported on 9/8/2023) 14 tablet 0    METOLAZONE 5 MG Oral Tab TAKE 1 TABLET(5 MG) BY MOUTH DAILY (Patient not taking: Reported on 9/8/2023) 90 tablet 1    INDOMETHACIN 50 MG Oral Cap TAKE 1 CAPSULE(50 MG) BY MOUTH TWICE DAILY WITH MEALS (Patient not taking: Reported on 9/8/2023) 180 capsule 1    BENAZEPRIL-HYDROCHLOROTHIAZIDE 20-12.5 MG Oral Tab TAKE 1 TABLET BY MOUTH DAILY (Patient not taking: Reported on 9/8/2023) 90 tablet 1    Potassium Chloride ER 8 MEQ Oral Tab CR Take 2 tablets (16 mEq total) by mouth 2 (two) times daily. (Patient not taking: Reported on 9/8/2023) 360 tablet 2    semaglutide (OZEMPIC, 1 MG/DOSE,) 4 MG/3ML Subcutaneous Solution Pen-injector Inject 1 mg into the skin once a week.  (Patient not taking: Reported on 10/3/2023) 9 mL 1    Insulin Syringe 31G X 5/16\" 0.5 ML Does not apply Misc AS DIRECTED TWICE DAILY (Patient not taking: Reported on 9/8/2023) 100 each 5     Allergies:  Seasonal                    Past Medical History:   Diagnosis Date    Age-related nuclear cataract of both eyes 1/14/2021    Arthritis     Cervical spine disease \"20011\" [PLS VERIFY]    \"cervical spine disease. surgical 3/34736\" per NG     Congestive heart disease ([address]     Diabetes ([address]     Diabetes mellitus ([address]     Diabetes mellitus type 2 with complications ([address]     diabetes mellitus, type 2 with comp. Insulin    Esophageal reflux     Glaucoma     1/14/21 1st visit with RJM- patient has DX of glaucoma and is on Latanoprost qhs OU, Dorzolamide bid OU and Timolol bid OU- being treated at [facility]- seeing RJM for DM EE only     Obesity, unspecified     Other and unspecified hyperlipidemia     Unspecified essential hypertension       Past Surgical History:   Procedure Laterality Date    BACK SURGERY  \"2011\"     \"3/2011 surgical.  cervical spine disease\"     BACK SURGERY      cervical fusion    COLONOSCOPY N/A 11/16/2021    Procedure: COLONOSCOPY;  Surgeon: Esequiel Fan MD;  Location: [facility] ENDOSCOPY    HC ARTHROCENTESIS OR INJECT INTERMED JOINT W/O US Bilateral     carpal tunnel injections bilaterally. Margarita Perez MD    [facility] OF [place], [facility]. INJECT SINGLE MULTIPLE TRIGGER POINT 1 OR 2 MUSC      Trigger Point Injections [SITES, # SITES NOT STATED].  provider: Adi Vazquez    OTHER SURGICAL HISTORY  01/03/2022    IPP, Dr. Norm Marvin Right 6/13    SPINE SURGERY PROCEDURE UNLISTED      c3-c7 cervical fusion       Family History   Problem Relation Age of Onset    Prostate Cancer Father     Hypertension Father     Diabetes Father     Cancer Father         bladder    Glaucoma Father     Diabetes Mother     Stroke Mother         CVA(stroke)    Cancer Maternal Aunt 80        stomach (cause of death)    Macular degeneration Neg       Social History:   Social History     Socioeconomic History    Marital status:     Number of children: 2   Tobacco Use    Smoking status: Never    Smokeless tobacco: Never   Vaping Use    Vaping Use: Never used   Substance and Sexual Activity    Alcohol use: Yes     Alcohol/week: 0.0 standard drinks of alcohol     Comment: Very rare    Drug use: No    Sexual activity: Not Currently   Other Topics Concern    Caffeine Concern Yes     Comment: rarely     Exercise Yes     Comment: therapy   Social History Narrative    The patient does not use an assistive device. .      The patient does live in a home with stairs. Social Determinants of Health  Financial Resource Strain: Low Risk  (7/5/2023)      Financial Resource Strain          Difficulty of Paying Living Expenses: Not very hard          Med Affordability: No  Transportation Needs: No Transportation Needs (7/5/2023)      Transportation Needs          Lack of Transportation: No     Teodoro Machado's SCREENING SCHEDULE   Tests on this list are recommended by your physician but may not be covered, or covered at this frequency, by your insurer. Please check with your insurance carrier before scheduling to verify coverage.     PREVENTATIVE SERVICES  INDICATIONS AND SCHEDULE Internal Lab or Procedure External Lab or Procedure   Diabetes Screening      HbgA1C   Annually HEMOGLOBIN A1C (%)   Date Value   06/12/2023 6.7 (A)     HEMOGLOBIN A1c (% of total Hgb)   Date Value   10/03/2023 5.5         No data to display                Fasting Blood Sugar (FSB) Annually GLUCOSE (mg/dL)   Date Value   10/03/2023 117 (H)       Cardiovascular Disease Screening     LDL Annually LDL-CHOLESTEROL (mg/dL (calc))   Date Value   06/12/2023 61        EKG One Time      Colorectal Cancer Screening      Colonoscopy Screen every 10 years Colorectal Cancer Screening due on 11/16/2025 Update Health Maintenance if applicable    Flex Sigmoidoscopy Screen every 5 years No results found for this or any previous visit. No data to display                 Fecal Occult Blood Annually No results found for: \"FOB\", \"OCCULTSTOOL\"      No data to display                Glaucoma Screening      Ophthalmology Visit Annually      Prostate Cancer Screening      PSA  Annually PSA due on 10/03/2025  Update Health Maintenance if applicable   Immunizations      Influenza No orders found for this or any previous visit. Update Immunization Activity if applicable    Pneumococcal No orders found for this or any previous visit. Update Immunization Activity if applicable    Hepatitis B No orders found for this or any previous visit. Update Immunization Activity if applicable    Tetanus No orders found for this or any previous visit. Update Immunization Activity if applicable    Zoster (Not covered by Medicare Part B) No orders found for this or any previous visit.  Update Immunization Activity if applicable     SPECIFIC DISEASE MONITORING Internal Lab or Procedure External Lab or Procedure   Annual Monitoring of Persistent     Medications (ACE/ARB, digoxin, diuretics)    Potassium  Annually POTASSIUM (mmol/L)   Date Value   10/03/2023 3.7         No data to display                Creatinine  Annually CREATININE (mg/dL)   Date Value   10/03/2023 1.13         No data to display                Digoxin Serum Conc  Annually No results found for: \"DIGOXIN\"      No data to display                Diabetes      HgbA1C  Annually HEMOGLOBIN A1C (%)   Date Value   06/12/2023 6.7 (A)     HEMOGLOBIN A1c (% of total Hgb)   Date Value   10/03/2023 5.5         No data to display                Creat/alb ratio  Annually      LDL  Annually LDL-CHOLESTEROL (mg/dL (calc))   Date Value   06/12/2023 61         No data to display                 Dilated Eye exam  Annually     4/19/2023    10:47 AM   Data entered on:   Last Dilated Eye Exam 12/20/2022          No data to display                COPD      Spirometry Testing Annually No results found for this or any previous visit. No data to display                    General Health     In the past six months, have you lost more than 10 pounds without trying?: 2 - No    Has your appetite been poor?: No    Type of Diet: Diabetic; Low Salt;Low Carb    How does the patient maintain a good energy level?: Appropriate Exercise    How would you describe your daily physical activity?: Moderate    How would you describe your current health state?: Fair    How do you maintain positive mental well-being?: Social Interaction         Have you had any immunizations at another office such as Influenza, Hepatitis B, Tetanus, or Pneumococcal?: No     Functional Ability     Bathing or Showering: Able without help    Toileting: Able without help    Dressing: Able without help    Eating: Able without help    Driving: Able without help    Preparing your meals: Able without help    Managing money/bills: Able without help    Taking medications as prescribed: Able without help    Are you able to afford your medications?: Yes    Hearing Problems?: No     Functional Status     Hearing Problems?: No    Vision Problems? : Yes    Difficulty walking?: No    Difficulty dressing or bathing?: No    Problems with daily activities? : No    Memory Problems?: No      Fall/Risk Assessment                                                              Depression Screening (PHQ-2/PHQ-9): Over the LAST 2 WEEKS                      Advance Directives     Do you have a healthcare power of ?: No    Do you have a living will?: No     Hearing Assessment (Required for AWV/SWV)      Hearing Screening    Time taken: 10/3/2023  8:31 AM  Screening Method: Finger Rub  Finger Rub Result: Pass               Visual Acuity     Right Eye Visual Acuity: Corrected Left Eye Visual Acuity: Corrected   Right Eye Chart Acuity: 20/25 Left Eye Chart Acuity: 20/30     Cognitive Assessment     What day of the week is this?: Correct    What month is it?: Correct    What year is it?: Correct    Recall \"Ball\": Correct    Recall \"Flag\": Correct    Recall \"Tree\": Correct          Objective:    10/03/23  0906   BP: 134/82   Pulse:    Temp:        Physical Exam  Constitutional:       General: He is not in acute distress. Appearance: Normal appearance. He is not ill-appearing. HENT:      Head: Normocephalic and atraumatic. Right Ear: Tympanic membrane normal.      Left Ear: Tympanic membrane normal.      Nose: Nose normal.   Cardiovascular:      Rate and Rhythm: Normal rate and regular rhythm. Heart sounds: Murmur heard. No gallop. Pulmonary:      Effort: Pulmonary effort is normal.      Breath sounds: Normal breath sounds. Musculoskeletal:      Left Lower Extremity: Left leg is amputated below ankle. (Left great toe amputated)  Feet:      Comments: Bilateral barefoot skin diabetic exam is normal, visualized feet and the appearance is normal.  Bilateral monofilament/sensation of both feet is normal.  Pulsation pedal pulse exam of both lower legs/feet is normal as well. Neurological:      Mental Status: He is alert and oriented to person, place, and time. Psychiatric:         Mood and Affect: Mood normal.         Assessment & Plan:   1. Medicare annual wellness visit, initial  Survey completed. Recent preventive care labs reviewed with the patient. 2. BMI 37.0-37.9, adult  Weight loss encouraged via safe exercising 150 minutes/week as well as continued disciplined dietary choices. 3. Personal history of prostate cancer  Status update.  - PSA, TOTAL W REFLEX TO PSA, FREE [20760][Q]    4. Essential hypertension  By criteria patient blood pressure measures to goal.    5. 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 The following labs have been ordered.   - COMP METABOLIC PANEL [18260] [Q]  - HGB A1C [496] [Q]    6. Influenza vaccination declined by patient  Declined. 7. Pneumococcal vaccination declined by patient  Declined. 8. Tetanus, diphtheria, and acellular pertussis (Tdap) vaccination declined  Declined. 9. Cardiomyopathy, unspecified type [facility])  Referred for general follow-up and establishment of cardiology care. - Cardio Referral - Internal      Orders Placed This Encounter      PSA, TOTAL W REFLEX TO PSA, FREE [82512][Q]      COMP METABOLIC PANEL [36291] [Q]      HGB A1C [496] [Q]      Meds This Visit:  Requested Prescriptions      No prescriptions requested or ordered in this encounter       Imaging & Referrals:  CARDIO - INTERNAL     Patient Instructions   All adult screening ordered and done appropriate for patient's age and gender and risk factors and complaints. Medication reviewed and renewed where needed and appropriate. Comply with medications. Monitor blood pressures and record at home. Limit salt intake. Encouraged safe physical fitness and daily physical activity daily. Recommend weight loss via daily exercising and consistent healthy dietary changes. Return in about 1 year (around 10/3/2024), or if symptoms worsen or fail to improve.

## 2023-10-09 ENCOUNTER — PROCEDURE VISIT (OUTPATIENT)
Dept: PHYSICAL MEDICINE AND REHAB | Facility: CLINIC | Age: 58
End: 2023-10-09
Payer: COMMERCIAL

## 2023-10-09 DIAGNOSIS — M54.2 NECK PAIN: Primary | ICD-10-CM

## 2023-10-09 DIAGNOSIS — R20.2 NUMBNESS AND TINGLING OF BOTH UPPER EXTREMITIES: ICD-10-CM

## 2023-10-09 DIAGNOSIS — R20.0 NUMBNESS AND TINGLING OF BOTH UPPER EXTREMITIES: ICD-10-CM

## 2023-10-09 DIAGNOSIS — R29.898 LEFT HAND WEAKNESS: ICD-10-CM

## 2023-10-12 ENCOUNTER — HOSPITAL ENCOUNTER (OUTPATIENT)
Dept: MRI IMAGING | Facility: HOSPITAL | Age: 58
Discharge: HOME | End: 2023-10-12
Attending: FAMILY MEDICINE
Payer: MEDICARE

## 2023-10-12 DIAGNOSIS — G56.12 MEDIAN NERVE DYSFUNCTION, LEFT: ICD-10-CM

## 2023-10-12 DIAGNOSIS — M54.12 CERVICAL RADICULITIS: ICD-10-CM

## 2023-10-12 PROCEDURE — 72141 MRI NECK SPINE W/O DYE: CPT | Performed by: FAMILY MEDICINE

## 2024-01-05 DIAGNOSIS — Z91.09 ENVIRONMENTAL ALLERGIES: ICD-10-CM

## 2024-01-06 RX ORDER — MONTELUKAST SODIUM 10 MG/1
10 TABLET ORAL NIGHTLY
Qty: 90 TABLET | Refills: 3 | Status: SHIPPED | OUTPATIENT
Start: 2024-01-06

## 2024-01-06 NOTE — TELEPHONE ENCOUNTER
Refill passed per [facility] protocol.    Requested Prescriptions   Pending Prescriptions Disp Refills    MONTELUKAST 10 MG Oral Tab [Pharmacy Med Name: MONTELUKAST 10MG TABLETS] 90 tablet 1     Sig: TAKE 1 TABLET(10 MG) BY MOUTH EVERY NIGHT       Asthma & COPD Medication Protocol Passed - 1/5/2024  5:51 AM        Passed - In person appointment or virtual visit in the past 6 mos or appointment in next 3 mos     Recent Outpatient Visits              3 months ago Medicare annual wellness visit, initial    [facility], [facility] Davin Corrigan, DO    Office Visit    4 months ago Personal history of prostate cancer    [facility], [facility] Davin Corrigan, DO    Office Visit    5 months ago Uncontrolled type 2 diabetes mellitus with hyperglycemia (HCC)    [facility], [facility], Diann White, ALFREDO    Office Visit    6 months ago Left hallux osteomyelitis (HCC)    [facility], [facility] Nette Rangel, APRYORDY    Office Visit    6 months ago Chronic ulcer of great toe of left foot, unspecified ulcer stage (HCC)    [facility], [facility] Nette Rangel, APRN    Office Visit                           Recent Outpatient Visits              3 months ago Medicare annual wellness visit, initial    [facility], [facility] Davin Corrigan, DO    Office Visit    4 months ago Personal history of prostate cancer    [facility], [facility] Davin Corrigan, DO    Office Visit    5 months ago Uncontrolled type 2 diabetes mellitus with hyperglycemia (HCC)    [facility], Diann White APRN    Office Visit    6 months ago Left hallux osteomyelitis (HCC)    [facility], [facility] Nette Rangel, APRYORDY    Office Visit    6  months ago Chronic ulcer of great toe of left foot, unspecified ulcer stage (HCC)    [facility], [facility] Nette Rangel APRN    Office Visit

## 2024-01-18 RX ORDER — BENAZEPRIL HYDROCHLORIDE AND HYDROCHLOROTHIAZIDE 20; 12.5 MG/1; MG/1
1 TABLET ORAL DAILY
Qty: 90 TABLET | Refills: 1 | OUTPATIENT
Start: 2024-01-18

## 2024-01-18 RX ORDER — AMLODIPINE BESYLATE 10 MG/1
5 TABLET ORAL 2 TIMES DAILY
Qty: 90 TABLET | Refills: 3 | OUTPATIENT
Start: 2024-01-18

## 2024-02-03 DIAGNOSIS — M54.2 CERVICALGIA: ICD-10-CM

## 2024-02-03 DIAGNOSIS — E11.9 TYPE 2 DIABETES MELLITUS WITHOUT COMPLICATION, WITHOUT LONG-TERM CURRENT USE OF INSULIN (HCC): ICD-10-CM

## 2024-02-06 NOTE — TELEPHONE ENCOUNTER
Ozempic Passes protocol but RN sent a [employer] message for clarification.See below.       MEDICATION RECORD :  semaglutide (OZEMPIC, 1 MG/DOSE,) 4 MG/3ML Subcutaneous Solution Pen-injector  Inject 1 mg into the skin once a week. Dispense: 9 mL, Refills: 1 ordered       10/18/2022 -- [id number] - [place], [place] - [address] AT [facility] OF [address] [facility], [phone], [phone] --  Closed -- Report     Patient not taking. Reported on 10/3/2023  Edit       DISPENSE HX ;  Dispensed Written Strength Quantity Refills Days Supply Provider Pharmacy    OZEMPIC 1MG PER DOSE (1X4MG PEN) 10/25/2023 10/18/2022  6 mL  56 Davin Corrigan, DO [facility] #...         Refill passed per [facility] protocol.     Requested Prescriptions   Pending Prescriptions Disp Refills    AMITRIPTYLINE 25 MG Oral Tab [Pharmacy Med Name: AMITRIPTYLINE 25MG TABLETS] 180 tablet 0     Sig: TAKE 1 TABLET BY MOUTH EVERY MORNING AND 1 TABLET BY MOUTH EVERY       There is no refill protocol information for this order       OZEMPIC, 1 MG/DOSE, 4 MG/3ML Subcutaneous Solution Pen-injector [Pharmacy Med Name: OZEMPIC 1MG PER DOSE (4MG/3ML) PFP] 9 mL 1     Sig: INJECT 1 MG UNDER THE SKIN EVERY WEEK       Diabetes Medication Protocol Passed - 2/3/2024 12:20 PM        Passed - Last A1C < 7.5 and within past 6 months     Lab Results   Component Value Date    A1C 5.5 10/03/2023             Passed - In person appointment or virtual visit in the past 6 mos or appointment in next 3 mos     Recent Outpatient Visits              4 months ago Medicare annual wellness visit, initial    [facility] [place]    Office Visit    5 months ago Personal history of prostate cancer    [facility] [place] Forrest J, DO    Office Visit    6 months ago Uncontrolled type 2 diabetes mellitus with hyperglycemia (HCC)    [facility],  [facility], Diann White APRN    Office Visit    7 months ago Left hallux osteomyelitis (HCC)    [facility], [facility] Nette Rangel, ALFREDO    Office Visit    7 months ago Chronic ulcer of great toe of left foot, unspecified ulcer stage (HCC)    [facility], [facility] Nette Osuna, ALFREDO    Office Visit                      Passed - EGFRCR or GFRAA > 50     GFR Evaluation  EGFRCR: 75 , resulted on 10/3/2023          Passed - GFR in the past 12 months                   Recent Outpatient Visits              4 months ago Medicare annual wellness visit, initial    [facility], [facility] [place] Bab[place], DO    Office Visit    5 months ago Personal history of prostate cancer    [facility], [facility] [place] Bab[place],     Office Visit    6 months ago Uncontrolled type 2 diabetes mellitus with hyperglycemia (HCC)    [facility], [facility], Diann White APRN    Office Visit    7 months ago Left hallux osteomyelitis (HCC)    [facility], [facility], [place] Osuna, APRYORDY    Office Visit    7 months ago Chronic ulcer of great toe of left foot, unspecified ulcer stage (HCC)    [facility], [facility] Nette Osuna, APRYORDY    Office Visit

## 2024-02-07 RX ORDER — AMITRIPTYLINE HYDROCHLORIDE 25 MG/1
TABLET, FILM COATED ORAL
Qty: 180 TABLET | Refills: 3 | OUTPATIENT
Start: 2024-02-07

## 2024-02-07 RX ORDER — AMITRIPTYLINE HYDROCHLORIDE 25 MG/1
25 TABLET, FILM COATED ORAL 2 TIMES DAILY
Qty: 180 TABLET | Refills: 0 | Status: SHIPPED | OUTPATIENT
Start: 2024-02-07

## 2024-02-07 RX ORDER — SEMAGLUTIDE 1.34 MG/ML
1 INJECTION, SOLUTION SUBCUTANEOUS
Qty: 9 ML | Refills: 1 | Status: SHIPPED | OUTPATIENT
Start: 2024-02-07

## 2024-02-07 NOTE — TELEPHONE ENCOUNTER
Please review.  Protocol failed / Has no protocol.     Requested Prescriptions   Pending Prescriptions Disp Refills    semaglutide (OZEMPIC, 1 MG/DOSE,) 4 MG/3ML Subcutaneous Solution Pen-injector [Pharmacy Med Name: OZEMPIC 1MG PER DOSE (4MG/3ML) PFP] 9 mL 1     Sig: Inject 1 mg into the skin every 7 days.       Diabetes Medication Protocol Passed - 2/6/2024  8:38 AM        Passed - Last A1C < 7.5 and within past 6 months     Lab Results   Component Value Date    A1C 5.5 10/03/2023             Passed - In person appointment or virtual visit in the past 6 mos or appointment in next 3 mos     Recent Outpatient Visits              4 months ago Medicare annual wellness visit, initial    [facility], [facility] Davin Corrigan,     Office Visit    5 months ago Personal history of prostate cancer    [facility], [facility] Davin Corrigan,     Office Visit    6 months ago Uncontrolled type 2 diabetes mellitus with hyperglycemia (HCC)    [facility], [facility] Diann Hines APRN    Office Visit    7 months ago Left hallux osteomyelitis ([facility])    [facility] Nette Rangel APRYORDY    Office Visit    7 months ago Chronic ulcer of great toe of left foot, unspecified ulcer stage ([facility])    [facility] Juan CarlosNette, APRYORDY    Office Visit                      Passed - Microalbumin procedure in past 12 months or taking ACE/ARB        Passed - EGFRCR or GFRAA > 50     GFR Evaluation  EGFRCR: 75 , resulted on 10/3/2023          Passed - GFR in the past 12 months          amitriptyline 25 MG Oral Tab 180 tablet 0     Sig: Take 1 tablet (25 mg total) by mouth in the morning and 1 tablet (25 mg total) before bedtime.       There is no refill protocol information for this order      Refused Prescriptions Disp Refills    amitriptyline 25 MG Oral  Tab [Pharmacy Med Name: AMITRIPTYLINE 25MG TABLETS] 180 tablet 3     Sig: TAKE 1 TABLET BY MOUTH EVERY MORNING AND 1 TABLET BY MOUTH EVERY       There is no refill protocol information for this order           Recent Outpatient Visits              4 months ago Medicare annual wellness visit, initial    [facility], [facility] Davin Babcock,     Office Visit    5 months ago Personal history of prostate cancer    [facility], [facility] [place] Davin Corrigan, DO    Office Visit    6 months ago Uncontrolled type 2 diabetes mellitus with hyperglycemia (HCC)    [facility], [facility] Diann Hines, ALFREDO    Office Visit    7 months ago Left hallux osteomyelitis (HCC)    [facility], [facility] Nette Osuna, ALFREDO    Office Visit    7 months ago Chronic ulcer of great toe of left foot, unspecified ulcer stage (HCC)    [facility], [facility] Nette Rangel, ALFREDO    Office Visit

## 2024-04-04 ENCOUNTER — NURSE TRIAGE (OUTPATIENT)
Dept: FAMILY MEDICINE CLINIC | Facility: CLINIC | Age: 59
End: 2024-04-04

## 2024-04-04 DIAGNOSIS — L03.012 CELLULITIS OF LEFT MIDDLE FINGER: Primary | ICD-10-CM

## 2024-04-04 RX ORDER — SULFAMETHOXAZOLE AND TRIMETHOPRIM 800; 160 MG/1; MG/1
1 TABLET ORAL 2 TIMES DAILY
Qty: 14 TABLET | Refills: 0 | Status: SHIPPED | OUTPATIENT
Start: 2024-04-04

## 2024-04-04 NOTE — TELEPHONE ENCOUNTER
Action Requested: Summary for Provider     []  Critical Lab, Recommendations Needed  [x] Need Additional Advice  []   FYI    []   Need Orders  [] Need Medications Sent to Pharmacy  []  Other     SUMMARY: Dr. Corrigan Patient has appt 4/8/24 and seeking either sooner appt or if antibiotic can be started?  Able to add on early morning tomorrow?    Reason for call: Finger Pain (Hx of cellulitis/)  Onset: early march, and again today     Lindsay, nurse care manager. For [facility] called with patient on the line.     Patient has Left middle finger is swollen and painful 6/10.   Patient was treated early March for cellulitis.     He was at [facility] and they gave antibiotic  Bactrim /160mg BID x 7days, and augmentin 875mg every 12hours for 7 days.   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   He can be contacted with provider response.   Reason for Disposition   Looks infected (e.g., spreading redness, red streak, pus)    Protocols used: Finger Pain-A-OH

## 2024-04-04 NOTE — TELEPHONE ENCOUNTER
Left a complete message and instruction to patient's voice mail (FYI-ADELAIDE)=first attempt.    Radha active, message sent .             Future Appointments   Date Time Provider Department Center   4/8/2024 10:30 AM Nette Rangel APRN [facility]        Disp Refills Start End    sulfamethoxazole-trimethoprim -160 MG Oral Tab per tablet 14 tablet 0 4/4/2024 --    Sig - Route: Take 1 tablet by mouth 2 (two) times daily. - Oral    Sent to pharmacy as: Sulfamethoxazole-Trimethoprim 800-160 MG Oral Tablet (Bactrim DS)    E-Prescribing Status: Receipt confirmed by pharmacy (4/4/2024 12:42 PM CDT)      Associated Diagnoses    Cellulitis of left middle finger  - Primary        Pharmacy    [id number] - [place], [place] - [address], [phone], [phone]

## 2024-04-04 NOTE — TELEPHONE ENCOUNTER
Please let the patient know that I did send in a prescription for Bactrim twice daily for 7 days.  He should keep his appointment so that we can evaluate this recurrent cellulitis.  Thank you.

## 2024-04-05 NOTE — TELEPHONE ENCOUNTER
Left detailed message as per ADELAIDE on file. Advised to call back if has further questions.   Patient has follow up scheduled.        Future Appointments   Date Time Provider Department Center   4/8/2024 10:30 AM Nette Rangel APRN [facility]

## 2024-04-08 ENCOUNTER — OFFICE VISIT (OUTPATIENT)
Dept: FAMILY MEDICINE CLINIC | Facility: CLINIC | Age: 59
End: 2024-04-08
Payer: COMMERCIAL

## 2024-04-08 VITALS
BODY MASS INDEX: 35 KG/M2 | DIASTOLIC BLOOD PRESSURE: 76 MMHG | WEIGHT: 237 LBS | OXYGEN SATURATION: 97 % | HEART RATE: 88 BPM | SYSTOLIC BLOOD PRESSURE: 118 MMHG | TEMPERATURE: 97 F

## 2024-04-08 DIAGNOSIS — R10.11 RUQ ABDOMINAL PAIN: ICD-10-CM

## 2024-04-08 DIAGNOSIS — M10.9 GOUT INVOLVING TOE, UNSPECIFIED CAUSE, UNSPECIFIED CHRONICITY, UNSPECIFIED LATERALITY: ICD-10-CM

## 2024-04-08 DIAGNOSIS — L03.012 CELLULITIS OF LEFT MIDDLE FINGER: ICD-10-CM

## 2024-04-08 DIAGNOSIS — Z09 HOSPITAL DISCHARGE FOLLOW-UP: Primary | ICD-10-CM

## 2024-04-08 DIAGNOSIS — R12 HEARTBURN: ICD-10-CM

## 2024-04-08 DIAGNOSIS — E11.65 UNCONTROLLED TYPE 2 DIABETES MELLITUS WITH HYPERGLYCEMIA (HCC): ICD-10-CM

## 2024-04-08 PROBLEM — L08.9 TOE INFECTION: Status: RESOLVED | Noted: 2023-06-28 | Resolved: 2024-04-08

## 2024-04-08 PROBLEM — L03.115 CELLULITIS OF RIGHT ANTERIOR LOWER LEG: Status: RESOLVED | Noted: 2021-03-11 | Resolved: 2024-04-08

## 2024-04-08 PROBLEM — Z87.39 HISTORY OF OSTEOMYELITIS: Status: RESOLVED | Noted: 2023-06-28 | Resolved: 2024-04-08

## 2024-04-08 PROCEDURE — 3074F SYST BP LT 130 MM HG: CPT

## 2024-04-08 PROCEDURE — 99215 OFFICE O/P EST HI 40 MIN: CPT

## 2024-04-08 PROCEDURE — G2211 COMPLEX E/M VISIT ADD ON: HCPCS

## 2024-04-08 PROCEDURE — 3078F DIAST BP <80 MM HG: CPT

## 2024-04-08 NOTE — PROGRESS NOTES
Teodoro Machado is a 58 year old male.  Chief Complaint   Patient presents with    Hospital F/U     Here for hospital follow up from 03/2024 for swelling to middle finger left hand. Pt is going to  antibiotics today.     Abdominal Pain     Here with complaints of \" burning sensation\" to abdomen.     Musculoskeletal Problem     Here with recurrent tender spots to chest area. Pt has had mammogram in past and was told normal and Ct of abdomen and part of chest showed \" nodules\" .     HPI:   Teodoro Machado presented to the clinic for follow up hospitalization at [facility] 3/4/24 for cellulitis of the left middle finger. Patient was discharged with bactrim and Augmentin. Patient states completed medication as prescribed.   Patient called PCP 4/4/24 advised  return of symptoms - swelling, erythema, tenderness. PCP Dr. Corrigan sent in script for bactrim BID x 7 days. Has not started script yet. Needs to  from pharmacy. HX of gout in toe and elbow.   Patient HX of DM. States recently restarted medication. Ozempic and sliding scale insulin. States blood sugar typically 110-120's. A1C 10.1 3/4/24.     Current Outpatient Medications   Medication Sig Dispense Refill    sulfamethoxazole-trimethoprim -160 MG Oral Tab per tablet Take 1 tablet by mouth 2 (two) times daily. 14 tablet 0    semaglutide (OZEMPIC, 1 MG/DOSE,) 4 MG/3ML Subcutaneous Solution Pen-injector Inject 1 mg into the skin every 7 days. 9 mL 1    montelukast 10 MG Oral Tab Take 1 tablet (10 mg total) by mouth nightly. 90 tablet 3    tamsulosin 0.4 MG Oral Cap Take 1 capsule (0.4 mg total) by mouth daily. 90 capsule 1    Glucose Blood (ONETOUCH ULTRA) In Vitro Strip TEST BLOOD SUGAR THREE TIMES DAILY AND ONCE AT NIGHT 300 strip 3    amitriptyline 25 MG Oral Tab Take 1 tablet (25 mg total) by mouth in the morning and 1 tablet (25 mg total) before bedtime.      atorvastatin 40 MG Oral Tab Take 1 tablet (40 mg total) by mouth nightly. 90 tablet 3     baclofen 10 MG Oral Tab Take 1 tablet (10 mg total) by mouth 3 (three) times daily. 270 tablet 3    brimonidine 0.2 % Ophthalmic Solution Place 1 drop into both eyes 2 (two) times daily.      CARVEDILOL 12.5 MG Oral Tab TAKE 1 TABLET BY MOUTH TWICE DAILY WITH MEALS 180 tablet 1    LATANOPROST 0.005 % Ophthalmic Solution INSTILL 1 DROP IN BOTH EYES DAILY 10 mL 2    DORZOLAMIDE HCL 2 % Ophthalmic Solution INSTILL 1 DROP IN BOTH EYES TWICE DAILY 10 mL 6    Vitamin D3, Cholecalciferol, 10 MCG (400 UNIT) Oral Tab Take 1 tablet (400 Units total) by mouth daily.      Vitamin C 500 MG Oral Tab Take 1 tablet (500 mg total) by mouth daily.      TRUEPLUS LANCETS 33G Does not apply Misc Check sugars 2-3 times a day. 100 each 3    insulin NPH & regular 70/30 (HUMULIN 70/30) (70-30) 100 Units/mL Subcutaneous Suspension daily as needed. (Patient not taking: Reported on 9/8/2023)      fluticasone propionate 50 MCG/ACT Nasal Suspension 2 sprays by Nasal route daily as needed (CONGESTION). (Patient not taking: Reported on 9/8/2023)  0    HYDROcodone-acetaminophen 5-325 MG Oral Tab Take 1 tablet by mouth every 4 (four) hours as needed for Pain. (Patient not taking: Reported on 9/8/2023) 14 tablet 0    METOLAZONE 5 MG Oral Tab TAKE 1 TABLET(5 MG) BY MOUTH DAILY (Patient not taking: Reported on 9/8/2023) 90 tablet 1    INDOMETHACIN 50 MG Oral Cap TAKE 1 CAPSULE(50 MG) BY MOUTH TWICE DAILY WITH MEALS (Patient not taking: Reported on 9/8/2023) 180 capsule 1    BENAZEPRIL-HYDROCHLOROTHIAZIDE 20-12.5 MG Oral Tab TAKE 1 TABLET BY MOUTH DAILY (Patient not taking: Reported on 9/8/2023) 90 tablet 1    Potassium Chloride ER 8 MEQ Oral Tab CR Take 2 tablets (16 mEq total) by mouth 2 (two) times daily. (Patient not taking: Reported on 9/8/2023) 360 tablet 2    Insulin Syringe 31G X 5/16\" 0.5 ML Does not apply Misc AS DIRECTED TWICE DAILY (Patient not taking: Reported on 9/8/2023) 100 each 5      Past Medical History:   Diagnosis Date     Age-related nuclear cataract of both eyes 1/14/2021    Arthritis     Cervical spine disease \"20011\" [PLS VERIFY]    \"cervical spine disease. surgical 3/59102\" per NG     Congestive heart disease (HCC)     Diabetes (HCC)     Diabetes mellitus (HCC)     Diabetes mellitus type 2 with complications (HCC)     diabetes mellitus, type 2 with comp.  Insulin    Esophageal reflux     Glaucoma     1/14/21 1st visit with RJM- patient has DX of glaucoma and is on Latanoprost qhs OU, Dorzolamide bid OU and Timolol bid OU- being treated at [facility]- seeing JORGITO for DM EE only     Obesity, unspecified     Other and unspecified hyperlipidemia     Unspecified essential hypertension       Past Surgical History:   Procedure Laterality Date    BACK SURGERY  \"2011\"     \"3/2011 surgical.  cervical spine disease\"     BACK SURGERY      cervical fusion    COLONOSCOPY N/A 11/16/2021    Procedure: COLONOSCOPY;  Surgeon: Aj Ordoñez MD;  Location: [facility] ENDOSCOPY    HC ARTHROCENTESIS OR INJECT INTERMED JOINT W/O US Bilateral     carpal tunnel injections bilaterally.  Jorge Moise MD    HC INJECT SINGLE MULTIPLE TRIGGER POINT 1 OR 2 MUSC      Trigger Point Injections [SITES, # SITES NOT STATED]. provider: Davin Corrigan    OTHER SURGICAL HISTORY  01/03/2022    IPP, Dr. Sanchez    REPAIR ROTATOR CUFF,ACUTE Right 6/13    SPINE SURGERY PROCEDURE UNLISTED      c3-c7 cervical fusion       Social History:  Social History     Socioeconomic History    Marital status:     Number of children: 2   Tobacco Use    Smoking status: Never    Smokeless tobacco: Never   Vaping Use    Vaping Use: Never used   Substance and Sexual Activity    Alcohol use: Yes     Alcohol/week: 0.0 standard drinks of alcohol     Comment: Very rare    Drug use: No    Sexual activity: Not Currently   Other Topics Concern    Caffeine Concern Yes     Comment: rarely     Exercise Yes     Comment: therapy   Social History Narrative    The patient does not  use an assistive device..      The patient does live in a home with stairs.     Social Determinants of Health     Financial Resource Strain: Low Risk  (7/5/2023)    Financial Resource Strain     Difficulty of Paying Living Expenses: Not very hard     Med Affordability: No   Transportation Needs: No Transportation Needs (7/5/2023)    Transportation Needs     Lack of Transportation: No      Family History   Problem Relation Age of Onset    Prostate Cancer Father     Hypertension Father     Diabetes Father     Cancer Father         bladder    Glaucoma Father     Diabetes Mother     Stroke Mother         CVA(stroke)    Cancer Maternal Aunt 88        stomach (cause of death)    Macular degeneration Neg       Allergies   Allergen Reactions    Seasonal         REVIEW OF SYSTEMS:   Review of Systems   Constitutional: Negative.    Respiratory: Negative.  Negative for shortness of breath.    Cardiovascular: Negative.  Negative for chest pain and palpitations.   Gastrointestinal:  Positive for abdominal pain (burning).   Skin:  Positive for color change (left middle finger swelling, erythema).   Neurological: Negative.  Negative for dizziness and headaches.   Psychiatric/Behavioral: Negative.     All other systems reviewed and are negative.     Wt Readings from Last 5 Encounters:   04/08/24 237 lb (107.5 kg)   10/03/23 255 lb (115.7 kg)   09/08/23 244 lb (110.7 kg)   07/31/23 246 lb (111.6 kg)   07/10/23 247 lb (112 kg)     Body mass index is 35 kg/m².      EXAM:   /76 (BP Location: Right arm, Patient Position: Sitting, Cuff Size: adult)   Pulse 88   Temp 97.2 °F (36.2 °C) (Temporal)   Wt 237 lb (107.5 kg)   SpO2 97%   BMI 35.00 kg/m²   Physical Exam  Vitals and nursing note reviewed.   Constitutional:       Appearance: Normal appearance.   HENT:      Head: Normocephalic and atraumatic.   Cardiovascular:      Rate and Rhythm: Normal rate and regular rhythm.      Pulses: Normal pulses.      Heart sounds: Normal heart  sounds.   Pulmonary:      Effort: Pulmonary effort is normal.      Breath sounds: Normal breath sounds.   Neurological:      General: No focal deficit present.      Mental Status: He is alert and oriented to person, place, and time.   Psychiatric:         Mood and Affect: Mood normal.         Behavior: Behavior normal.            ASSESSMENT AND PLAN:   (Z09) Hospital discharge follow-up  (primary encounter diagnosis)  (L03.012) Cellulitis of left middle finger  Plan: reviewed hospital note, labs, discharge, imaging. Patient states completed medication as prescribed. Returned on 4/4/24. Reached out to PCP. PCP restarted bactrim. Patient has not started due to pharmacy was closed over the weekend. Will start today. Denies fever, drainage, open wound. Advised if no improvement after medication follow up.     (E11.65) Uncontrolled type 2 diabetes mellitus with hyperglycemia (HCC)  Plan: reviewed A1C from 3/2024 - 10.1. states taking medication as prescribed. Working to improve diet. Checks BS at home, stated 110-120's. Will follow up in 3 months to monitor A1C>     (M10.9) Gout involving toe, unspecified cause, unspecified chronicity, unspecified laterality  Plan: patient with HX of gout to the toes and elbow. Possible gout in finger. Advised to follow up if symptoms do not improve with antibiotics as prescribed by PCP.     (R12) Heartburn  (R10.11) RUQ abdominal pain  Plan: Gastro Referral - In Network  Patient with RUQ abdominal pain, radiates to the LUQ. Associated epigastric pain. Burning. Intermittent. Unknown triggers. Advised to avoid spicy, citrus, greasy, fried, alcohol. Keep food log to help determine triggers. Previously followed with GI. Interested in follow up. Referral placed.       Follow up in 3 months or sooner if needed     The patient indicates understanding of these issues and agrees to the plan.  Chaperone offered to the patient prior to examination    This note was prepared using [employer]  voice recognition dictation software. As a result errors may occur. When identified these errors have been corrected. While every attempt is made to correct errors during dictation discrepancies may still exist.

## 2024-04-15 DIAGNOSIS — Z85.46 PERSONAL HISTORY OF PROSTATE CANCER: ICD-10-CM

## 2024-04-16 RX ORDER — TAMSULOSIN HYDROCHLORIDE 0.4 MG/1
0.4 CAPSULE ORAL DAILY
Qty: 90 CAPSULE | Refills: 3 | Status: SHIPPED | OUTPATIENT
Start: 2024-04-16

## 2024-04-16 NOTE — TELEPHONE ENCOUNTER
Refill passed per [facility] protocol.  Requested Prescriptions   Pending Prescriptions Disp Refills    TAMSULOSIN 0.4 MG Oral Cap [Pharmacy Med Name: TAMSULOSIN 0.4MG CAPSULES] 90 capsule 1     Sig: TAKE 1 CAPSULE(0.4 MG) BY MOUTH DAILY       Genitourinary Medications Passed - 4/15/2024  5:45 AM        Passed - Patient does not have pulmonary hypertension on problem list        Passed - In person appointment or virtual visit in the past 12 mos or appointment in next 3 mos     Recent Outpatient Visits              1 week ago Hospital discharge follow-up    [facility], [facility] Nette Rangel APRN    Office Visit    6 months ago Medicare annual wellness visit, initial    [facility], [facility] Davin Corrigan,     Office Visit    7 months ago Personal history of prostate cancer    [facility], [facility] Davin Corrigan,     Office Visit    8 months ago Uncontrolled type 2 diabetes mellitus with hyperglycemia ([facility])    [facility], Diann White APRN    Office Visit    9 months ago Left hallux osteomyelitis (HCC)    [facility], [facility] Nette Rangel APRN    Office Visit                           Recent Outpatient Visits              1 week ago Hospital discharge follow-up    [facility], [facility] Nette Rangel APRN    Office Visit    6 months ago Medicare annual wellness visit, initial    [facility], [facility] Davin Corrigan,     Office Visit    7 months ago Personal history of prostate cancer    [facility], [facility] Davin Corrigan,     Office Visit    8 months ago Uncontrolled type 2 diabetes mellitus with hyperglycemia (HCC)    [facility], [facility], Diann White APRN     Office Visit    9 months ago Left hallux osteomyelitis (HCC)    [facility], [facility] Nette Rangel, ALFREDO    Office Visit

## 2024-05-02 ENCOUNTER — TELEPHONE (OUTPATIENT)
Dept: FAMILY MEDICINE CLINIC | Facility: CLINIC | Age: 59
End: 2024-05-02

## 2024-05-06 DIAGNOSIS — I10 HYPERTENSION, UNSPECIFIED TYPE: ICD-10-CM

## 2024-05-08 RX ORDER — POTASSIUM CHLORIDE 600 MG/1
16 TABLET, FILM COATED, EXTENDED RELEASE ORAL 2 TIMES DAILY
Qty: 360 TABLET | Refills: 2 | Status: SHIPPED | OUTPATIENT
Start: 2024-05-08

## 2024-05-08 NOTE — TELEPHONE ENCOUNTER
Please review; protocol failed.    Requested Prescriptions   Pending Prescriptions Disp Refills    POTASSIUM CHLORIDE ER 8 MEQ Oral Tab CR [Pharmacy Med Name: POTASSIUM CHLORIDE ER 8MEQ ER TABS] 360 tablet 2     Sig: TAKE 2 TABLETS(16 MEQ) BY MOUTH TWICE DAILY       There is no refill protocol information for this order              Recent Outpatient Visits              1 month ago Hospital discharge follow-up    [facility], [facility] Nette Osuna APRN    Office Visit    7 months ago Medicare annual wellness visit, initial    [facility] [place] [place]     Office Visit    8 months ago Personal history of prostate cancer    [facility] Davin Corrigan,     Office Visit    9 months ago Uncontrolled type 2 diabetes mellitus with hyperglycemia (HCC)    [facility], [facility], Diann White, ALFREDO    Office Visit    10 months ago Left hallux osteomyelitis (HCC)    [facility], [facility] Nette Osuna APRN    Office Visit

## 2024-05-16 ENCOUNTER — TELEPHONE (OUTPATIENT)
Dept: FAMILY MEDICINE CLINIC | Facility: CLINIC | Age: 59
End: 2024-05-16

## 2024-05-16 NOTE — TELEPHONE ENCOUNTER
Couldn't leave a message because mailbox was full.  Called to make an annual medicare physical appointment.

## 2024-07-01 DIAGNOSIS — I10 HYPERTENSION, UNSPECIFIED TYPE: ICD-10-CM

## 2024-07-03 RX ORDER — CARVEDILOL 12.5 MG/1
12.5 TABLET ORAL 2 TIMES DAILY WITH MEALS
Qty: 180 TABLET | Refills: 3 | Status: SHIPPED | OUTPATIENT
Start: 2024-07-03

## 2024-07-03 NOTE — TELEPHONE ENCOUNTER
Refill passed per [facility] protocol.      Requested Prescriptions   Pending Prescriptions Disp Refills    CARVEDILOL 12.5 MG Oral Tab [Pharmacy Med Name: CARVEDILOL 12.5MG TABLETS] 180 tablet 1     Sig: TAKE 1 TABLET BY MOUTH TWICE DAILY WITH MEALS       Hypertension Medications Protocol Passed - 7/1/2024  3:44 PM        Passed - CMP or BMP in past 12 months        Passed - Last BP reading less than 140/90     BP Readings from Last 1 Encounters:   04/08/24 118/76               Passed - In person appointment or virtual visit in the past 12 mos or appointment in next 3 mos     Recent Outpatient Visits              2 months ago Hospital discharge follow-up    [facility], [facility] Nette Rangel APRN    Office Visit    9 months ago Medicare annual wellness visit, initial    [facility] Davin Corrigan DO    Office Visit    9 months ago Personal history of prostate cancer    [facility], [facility] Davin Corrigan,     Office Visit    11 months ago Uncontrolled type 2 diabetes mellitus with hyperglycemia (HCC)    [facility], Diann White APRN    Office Visit    11 months ago Left hallux osteomyelitis (HCC)    [facility], [facility] Nette Rangel APRN    Office Visit                      Passed - EGFRCR or GFRAA > 50     GFR Evaluation  EGFRCR: 75 , resulted on 10/3/2023                    Recent Outpatient Visits              2 months ago Hospital discharge follow-up    [facility] Nette Rangel APRN    Office Visit    9 months ago Medicare annual wellness visit, initial    [facility], [facility] Davin Corrigan DO    Office Visit    9 months ago Personal history of prostate cancer    [facility], [facility]  Davin Corrigan,     Office Visit    11 months ago Uncontrolled type 2 diabetes mellitus with hyperglycemia ([facility])    [facility], [facility] [place] Diann Hines APRN    Office Visit    11 months ago Left hallux osteomyelitis ([facility])    [facility], [facility] Nette Rangel, ALFREDO    Office Visit

## 2024-07-17 DIAGNOSIS — E78.5 HYPERLIPIDEMIA, UNSPECIFIED HYPERLIPIDEMIA TYPE: ICD-10-CM

## 2024-07-19 NOTE — TELEPHONE ENCOUNTER
Please review. Rx failed/no protocol.    Requested Prescriptions   Pending Prescriptions Disp Refills    ATORVASTATIN 40 MG Oral Tab [Pharmacy Med Name: ATORVASTATIN 40MG TABLETS] 90 tablet 3     Sig: TAKE 1 TABLET(40 MG) BY MOUTH EVERY NIGHT       Cholesterol Medication Protocol Failed - 7/17/2024  5:52 AM        Failed - Lipid panel within past 12 months     Lab Results   Component Value Date    CHOLEST 123 06/12/2023    TRIG 115 06/12/2023    HDL 42 06/12/2023    LDL 61 06/12/2023    VLDL 19 08/19/2021    TCHDLRATIO 2.9 06/12/2023    NONHDLC 81 06/12/2023             Passed - ALT < 80     Lab Results   Component Value Date    ALT 21 10/03/2023             Passed - ALT resulted within past year        Passed - In person appointment or virtual visit in the past 12 mos or appointment in next 3 mos     Recent Outpatient Visits              3 months ago Hospital discharge follow-up    [facility], [facility] Nette Rangel APRN    Office Visit    9 months ago Medicare annual wellness visit, initial    [facility] Davin Corrigan DO    Office Visit    10 months ago Personal history of prostate cancer    [facility], [facility] Davin Corrigan DO    Office Visit    11 months ago Uncontrolled type 2 diabetes mellitus with hyperglycemia ([facility])    [facility] [place]    Office Visit    1 year ago Left hallux osteomyelitis (HCC)    [facility], [facility] Nette Rangel APRN    Office Visit                        BACLOFEN 10 MG Oral Tab [Pharmacy Med Name: BACLOFEN 10MG TABLETS] 270 tablet 3     Sig: TAKE 1 TABLET(10 MG) BY MOUTH THREE TIMES DAILY       There is no refill protocol information for this order            Recent Outpatient Visits              3 months ago Hospital discharge follow-up    [facility], [facility] Nette Osuna APRN    Office Visit    9 months ago Medicare annual wellness visit, initial    [facility], [facility] Davin Corrigan,     Office Visit    10 months ago Personal history of prostate cancer    [facility] [place],     Office Visit    11 months ago Uncontrolled type 2 diabetes mellitus with hyperglycemia ([facility])    [facility], [facility] [place] Diann Hines, ALFREDO    Office Visit    1 year ago Left hallux osteomyelitis (HCC)    [facility], [facility] Nette Osuna APRN    Office Visit

## 2024-07-20 RX ORDER — ATORVASTATIN CALCIUM 40 MG/1
40 TABLET, FILM COATED ORAL NIGHTLY
Qty: 90 TABLET | Refills: 3 | Status: SHIPPED | OUTPATIENT
Start: 2024-07-20

## 2024-07-20 RX ORDER — BACLOFEN 10 MG/1
10 TABLET ORAL 3 TIMES DAILY
Qty: 270 TABLET | Refills: 3 | Status: SHIPPED | OUTPATIENT
Start: 2024-07-20

## 2024-08-13 ENCOUNTER — TELEPHONE (OUTPATIENT)
Dept: FAMILY MEDICINE CLINIC | Facility: CLINIC | Age: 59
End: 2024-08-13

## 2024-08-13 NOTE — TELEPHONE ENCOUNTER
Patient will like to schedule a appointment to see you to discuss a MRI report.   Patient stated that he had a MRI done  last week Aug 3,24, it was ordered by his  office because he had injuries on both hips. The MRI was then sent to patient physical therapist on Aug 9, 24. Patient was notified by Physical therapist that he has a mass  in his abdominal area and he needed to follow with his PCP asap. Patient will drop of the MRI report in 1 hr. He will like to know if you can add him to your reschedule to discuss this MRI report or if you can see the report and recommend on the next step.  you have no open appointments for this week can you add patient to your schedule this week?  Please advise.

## 2024-08-14 ENCOUNTER — OFFICE VISIT (OUTPATIENT)
Dept: FAMILY MEDICINE CLINIC | Facility: CLINIC | Age: 59
End: 2024-08-14

## 2024-08-14 VITALS
WEIGHT: 239 LBS | OXYGEN SATURATION: 95 % | BODY MASS INDEX: 35 KG/M2 | DIASTOLIC BLOOD PRESSURE: 83 MMHG | HEART RATE: 96 BPM | TEMPERATURE: 98 F | SYSTOLIC BLOOD PRESSURE: 129 MMHG | RESPIRATION RATE: 18 BRPM

## 2024-08-14 DIAGNOSIS — E11.9 TYPE 2 DIABETES MELLITUS WITHOUT COMPLICATION, WITHOUT LONG-TERM CURRENT USE OF INSULIN (HCC): Primary | ICD-10-CM

## 2024-08-14 DIAGNOSIS — N94.89 ADNEXAL MASS: ICD-10-CM

## 2024-08-14 DIAGNOSIS — Z28.21 TETANUS, DIPHTHERIA, AND ACELLULAR PERTUSSIS (TDAP) VACCINATION DECLINED: ICD-10-CM

## 2024-08-14 DIAGNOSIS — Z28.21 PNEUMOCOCCAL VACCINATION DECLINED BY PATIENT: ICD-10-CM

## 2024-08-14 LAB — HEMOGLOBIN A1C: 6.3 % (ref 4.3–5.6)

## 2024-08-14 PROCEDURE — 83036 HEMOGLOBIN GLYCOSYLATED A1C: CPT | Performed by: FAMILY MEDICINE

## 2024-08-14 PROCEDURE — 3044F HG A1C LEVEL LT 7.0%: CPT | Performed by: FAMILY MEDICINE

## 2024-08-14 PROCEDURE — 99214 OFFICE O/P EST MOD 30 MIN: CPT | Performed by: FAMILY MEDICINE

## 2024-08-14 PROCEDURE — 3079F DIAST BP 80-89 MM HG: CPT | Performed by: FAMILY MEDICINE

## 2024-08-14 PROCEDURE — 3074F SYST BP LT 130 MM HG: CPT | Performed by: FAMILY MEDICINE

## 2024-08-14 NOTE — PROGRESS NOTES
Subjective:     Patient ID: Teodoro Machado is a 58 year old male.    This patient is a well-established hypertensive/diabetic -American male who via specialty assessment and specialty recommended MRI of the lumbar spine was found to have a questionable area in the right adnexal region.  A mass cannot be excluded, but it appears to be a cystic structure in the cecal region of the right lower quadrant.  Patient has normal bowel movements and does not have any blood per rectum, nor does he have darkened stool.  There is no complaint of abdominal bloating and any and all weight loss has been intentional by his own efforts.    I was able to read the written result of this lumbar MRI which incidentally noted a right adnexal finding.  Recommendation was given to get an abdominal/pelvic CT with and without contrast which has been ordered.            History/Other:   Review of Systems  Current Outpatient Medications   Medication Sig Dispense Refill    atorvastatin 40 MG Oral Tab Take 1 tablet (40 mg total) by mouth nightly. 90 tablet 3    baclofen 10 MG Oral Tab Take 1 tablet (10 mg total) by mouth 3 (three) times daily. 270 tablet 3    carvedilol 12.5 MG Oral Tab Take 1 tablet (12.5 mg total) by mouth 2 (two) times daily with meals. 180 tablet 3    POTASSIUM CHLORIDE ER 8 MEQ Oral Tab CR TAKE 2 TABLETS(16 MEQ) BY MOUTH TWICE DAILY 360 tablet 2    tamsulosin 0.4 MG Oral Cap Take 1 capsule (0.4 mg total) by mouth daily. 90 capsule 3    sulfamethoxazole-trimethoprim -160 MG Oral Tab per tablet Take 1 tablet by mouth 2 (two) times daily. 14 tablet 0    semaglutide (OZEMPIC, 1 MG/DOSE,) 4 MG/3ML Subcutaneous Solution Pen-injector Inject 1 mg into the skin every 7 days. 9 mL 1    montelukast 10 MG Oral Tab Take 1 tablet (10 mg total) by mouth nightly. 90 tablet 3    Glucose Blood (ONETOUCH ULTRA) In Vitro Strip TEST BLOOD SUGAR THREE TIMES DAILY AND ONCE AT NIGHT 300 strip 3    brimonidine 0.2 % Ophthalmic Solution  Place 1 drop into both eyes 2 (two) times daily.      LATANOPROST 0.005 % Ophthalmic Solution INSTILL 1 DROP IN BOTH EYES DAILY 10 mL 2    DORZOLAMIDE HCL 2 % Ophthalmic Solution INSTILL 1 DROP IN BOTH EYES TWICE DAILY 10 mL 6    Vitamin D3, Cholecalciferol, 10 MCG (400 UNIT) Oral Tab Take 1 tablet (400 Units total) by mouth daily.      Vitamin C 500 MG Oral Tab Take 1 tablet (500 mg total) by mouth daily.      insulin NPH & regular 70/30 (HUMULIN 70/30) (70-30) 100 Units/mL Subcutaneous Suspension daily as needed. (Patient not taking: Reported on 9/8/2023)      amitriptyline 25 MG Oral Tab Take 1 tablet (25 mg total) by mouth in the morning and 1 tablet (25 mg total) before bedtime.      fluticasone propionate 50 MCG/ACT Nasal Suspension 2 sprays by Nasal route daily as needed (CONGESTION). (Patient not taking: Reported on 9/8/2023)  0    Insulin Syringe 31G X 5/16\" 0.5 ML Does not apply Misc AS DIRECTED TWICE DAILY (Patient not taking: Reported on 9/8/2023) 100 each 5    TRUEPLUS LANCETS 33G Does not apply Misc Check sugars 2-3 times a day. 100 each 3     Allergies:  Allergies   Allergen Reactions    Seasonal        Past Medical History:    Age-related nuclear cataract of both eyes    Arthritis    Cervical spine disease    \"cervical spine disease. surgical 3/17236\" per NG     Congestive heart disease (HCC)    Diabetes (HCC)    Diabetes mellitus (HCC)    Diabetes mellitus type 2 with complications (HCC)    diabetes mellitus, type 2 with comp.  Insulin    Esophageal reflux    Glaucoma    1/14/21 1st visit with JORGITO- patient has DX of glaucoma and is on Latanoprost qhs OU, Dorzolamide bid OU and Timolol bid OU- being treated at [facility]- seeing JORGITO for DM EE only     Obesity, unspecified    Other and unspecified hyperlipidemia    Unspecified essential hypertension      Past Surgical History:   Procedure Laterality Date    Back surgery  \"2011\"     \"3/2011 surgical.  cervical spine disease\"     Back surgery       cervical fusion    Colonoscopy N/A 11/16/2021    Procedure: COLONOSCOPY;  Surgeon: Aj Ordoñez MD;  Location: [facility] ENDOSCOPY    Hc arthrocentesis or inject intermed joint w/o us Bilateral     carpal tunnel injections bilaterally.  Jorge Moise MD    Hc inject single multiple trigger point 1 or 2 musc      Trigger Point Injections [SITES, # SITES NOT STATED]. provider: Davin Corrigan    Other surgical history  01/03/2022    IPP, Dr. Sanchez    Repair rotator cuff,acute Right 6/13    Spine surgery procedure unlisted      c3-c7 cervical fusion       Family History   Problem Relation Age of Onset    Prostate Cancer Father     Hypertension Father     Diabetes Father     Cancer Father         bladder    Glaucoma Father     Diabetes Mother     Stroke Mother         CVA(stroke)    Cancer Maternal Aunt 88        stomach (cause of death)    Macular degeneration Neg       Social History:   Social History     Socioeconomic History    Marital status:     Number of children: 2   Tobacco Use    Smoking status: Never    Smokeless tobacco: Never   Vaping Use    Vaping status: Never Used   Substance and Sexual Activity    Alcohol use: Yes     Alcohol/week: 0.0 standard drinks of alcohol     Comment: Very rare    Drug use: No    Sexual activity: Not Currently   Other Topics Concern    Caffeine Concern Yes     Comment: rarely     Exercise Yes     Comment: therapy   Social History Narrative    The patient does not use an assistive device..      The patient does live in a home with stairs.     Social Determinants of Health     Financial Resource Strain: Patient Declined (3/4/2024)    [facility]    Overall Financial Resource Strain (CARDIA)     Difficulty of Paying Living Expenses: Patient declined   Food Insecurity: No Food Insecurity (3/4/2024)    Received from [facility], [facility]    Hunger Vital Sign      Worried About Running Out of Food in the Last Year: Never true     Ran Out of Food in the Last Year: Never true   Transportation Needs: No Transportation Needs (3/4/2024)    Received from [facility]    PRAPARE - Transportation     Lack of Transportation (Medical): No     Lack of Transportation (Non-Medical): No   Physical Activity: Inactive (3/4/2024)    Received from [facility]    Exercise Vital Sign     Days of Exercise per Week: 0 days     Minutes of Exercise per Session: 0 min   Stress: Stress Concern Present (3/4/2024)    Received from [facility]    [place] [employer] - Occupational Stress Questionnaire     Feeling of Stress : To some extent   Social Connections: Moderately Isolated (3/4/2024)    Received from [facility]    Social Connection and Isolation Panel [NHANES]     Frequency of Communication with Friends and Family: More than three times a week     Frequency of Social Gatherings with Friends and Family: Three times a week     Attends Nondenominational Services: 1 to 4 times per year     Active Member of Clubs or Organizations: No     Attends Club or Organization Meetings: Never     Marital Status:    Housing Stability: Low Risk  (3/4/2024)    Received from [facility]    Housing Stability Vital Sign     Unable to Pay for Housing in the Last Year: No     Number of Places Lived in the Last Year: 1     In the last 12 months, was there a time when you did not have a steady place to sleep or slept in a shelter (including now)?: No        Objective:   Vitals:    08/14/24 1340   BP: 129/83   Pulse: 96   Resp: 18   Temp: 97.8 °F (36.6 °C)       Physical Exam  Constitutional:       Appearance: Normal appearance.   Cardiovascular:       Rate and Rhythm: Normal rate and regular rhythm.      Heart sounds: Murmur heard.      No gallop.   Pulmonary:      Breath sounds: Normal breath sounds.   Abdominal:      Tenderness: There is abdominal tenderness in the right upper quadrant and epigastric area.          Comments: Regions of discomfort as depicted.   Neurological:      Mental Status: He is alert.         Assessment & Plan:   1. Adnexal mass  Ordered.  - CT ABDOMEN+PELVIS (W AND W/O CONTRAST)(CPT=74178); Future    2. Type 2 diabetes mellitus without complication, without long-term current use of insulin (HCC)  Update on today.  - POC Glycohemoglobin [44004]  - Microalb/Creat Ratio, Random Urine [E]; Future    3. Tetanus, diphtheria, and acellular pertussis (Tdap) vaccination declined  Declined by patient.  - TETANUS, DIPHTHERIA TOXOIDS AND ACELLULAR PERTUSIS VACCINE (TDAP), >7 YEARS, IM USE    4. Pneumococcal vaccination declined by patient  Declined by patient.  - Prevnar 20 (PCV20) [47204]      Orders Placed This Encounter   Procedures    POC Glycohemoglobin [56966]    Microalb/Creat Ratio, Random Urine [E]    TETANUS, DIPHTHERIA TOXOIDS AND ACELLULAR PERTUSIS VACCINE (TDAP), >7 YEARS, IM USE    Prevnar 20 (PCV20) [58799]       Meds This Visit:  Requested Prescriptions      No prescriptions requested or ordered in this encounter       Imaging & Referrals:  TETANUS, DIPHTHERIA TOXOIDS AND ACELLULAR PERTUSIS VACCINE (TDAP), >7 YEARS, IM USE  PCV20 VACCINE FOR INTRAMUSCULAR USE  CT ABDOMEN+PELVIS(ALL W+WO)(CPT=74178)     Patient Instructions   CT of abdomen/pelvis attention to right adnexal/cecum  ordered.    Return in about 3 weeks (around 9/4/2024), or if symptoms worsen or fail to improve.

## 2024-08-14 NOTE — TELEPHONE ENCOUNTER
I did not see the MRI report.  Please reach out to the patient and make sure that he brings a copy of the written result concerning this MRI.  He is placed on my schedule to be seen on Wednesday, August 14, 2024.

## 2024-09-10 ENCOUNTER — TELEPHONE (OUTPATIENT)
Dept: FAMILY MEDICINE CLINIC | Facility: CLINIC | Age: 59
End: 2024-09-10

## 2024-09-10 NOTE — TELEPHONE ENCOUNTER
Reached patient for medication adherence consult. Patient overdue for refill on atorvastatin per insurance report.    Patient reports that he is taking the medication as prescribed. He reports tolerating the medication well. He denies the need for refills at this time.    Provided education on importance of adherence. Patient denies any questions or concerns with medication.

## 2024-09-11 DIAGNOSIS — E11.9 TYPE 2 DIABETES MELLITUS WITHOUT COMPLICATION, WITHOUT LONG-TERM CURRENT USE OF INSULIN (HCC): ICD-10-CM

## 2024-09-16 ENCOUNTER — NURSE TRIAGE (OUTPATIENT)
Dept: FAMILY MEDICINE CLINIC | Facility: CLINIC | Age: 59
End: 2024-09-16

## 2024-09-16 DIAGNOSIS — M10.9 ACUTE GOUT OF ELBOW, UNSPECIFIED CAUSE, UNSPECIFIED LATERALITY: ICD-10-CM

## 2024-09-16 RX ORDER — SEMAGLUTIDE 1.34 MG/ML
1 INJECTION, SOLUTION SUBCUTANEOUS
Qty: 9 ML | Refills: 1 | Status: SHIPPED | OUTPATIENT
Start: 2024-09-16

## 2024-09-16 NOTE — TELEPHONE ENCOUNTER
Please review; protocol failed    Future Appointments   Date Time Provider Department Center   10/15/2024  4:00 PM Davin Corrigan DO [facility]     Last office visit: 8/14/2024    Requested Prescriptions   Pending Prescriptions Disp Refills    OZEMPIC, 1 MG/DOSE, 4 MG/3ML Subcutaneous Solution Pen-injector [Pharmacy Med Name: OZEMPIC 1MG PER DOSE (4MG/3ML) PFP] 9 mL 1     Sig: INJECT 1MG INTO THE SKIN EVERY 7 DAYS       Diabetes Medication Protocol Failed - 9/11/2024  2:57 PM        Failed - Microalbumin procedure in past 12 months or taking ACE/ARB        Passed - Last A1C < 7.5 and within past 6 months     Lab Results   Component Value Date    A1C 6.3 (A) 08/14/2024             Passed - In person appointment or virtual visit in the past 6 mos or appointment in next 3 mos     Recent Outpatient Visits              1 month ago Type 2 diabetes mellitus without complication, without long-term current use of insulin (HCC)    [facility] Davin Corrigan DO    Office Visit    5 months ago Hospital discharge follow-up    [facility] Nette Rangel APRN    Office Visit    11 months ago Medicare annual wellness visit, initial    [facility] Davin Corrigan DO    Office Visit    1 year ago Personal history of prostate cancer    [facility] Davin Corrigan DO    Office Visit    1 year ago Uncontrolled type 2 diabetes mellitus with hyperglycemia (HCC)    [facility], [facility], [place] Diann Hines APRN    Office Visit          Future Appointments         Provider Department Appt Notes    In 4 weeks Davin Corrigan DO [facility]                    Passed - EGFRCR or GFRAA > 50     GFR Evaluation  EGFRCR: 75 , resulted on 10/3/2023          Passed - GFR  in the past 12 months             Future Appointments         Provider Department Appt Notes    In 4 weeks Davin Corrigan,  [facility], [facility]          Recent Outpatient Visits              1 month ago Type 2 diabetes mellitus without complication, without long-term current use of insulin (HCC)    [facility] Davin Corrigan,     Office Visit    5 months ago Hospital discharge follow-up    [facility] Nette Rangel APRN    Office Visit    11 months ago Medicare annual wellness visit, initial    [facility] Davin Corrigan,     Office Visit    1 year ago Personal history of prostate cancer    [facility] Davin Corrigan,     Office Visit    1 year ago Uncontrolled type 2 diabetes mellitus with hyperglycemia (HCC)    [facility], [facility] [place] Diann Hines, ALFREDO    Office Visit

## 2024-09-23 NOTE — TELEPHONE ENCOUNTER
Dear Nursing Staff,    Please verify with patient if requesting refill for Indomethacin? From a review of patients chart was discontinued on 04/08/2024,thanks.

## 2024-09-23 NOTE — TELEPHONE ENCOUNTER
Patient returned our call.   Patient's date of birth and full name both confirmed.     He states he takes indomethacin daily - to prevent a gout flare-ups.   Per patient, he is almost out of medication.   And also verbalizes concern about its impact on his kidneys.     Per patient, she does not currently have a gout flare up, and denies symptoms of it now.     RN advised appointment to discuss treatment plan.   He verbalizes understanding of all information, and agreeable to plan.       Future Appointments   Date Time Provider Department Center   9/25/2024  2:00 PM Aylin Archer MD [facility]   9/30/2024 11:30 AM LMB CT RM1 [facility] MOB. CT EM [place]   10/15/2024  4:00 PM Davin Corrigan DO [facility]         Reason for Disposition   Prescription request for new medicine (not a refill)    Protocols used: Medication Question Call-A-OH

## 2024-09-24 RX ORDER — INDOMETHACIN 50 MG/1
50 CAPSULE ORAL 2 TIMES DAILY WITH MEALS
Qty: 180 CAPSULE | Refills: 1 | Status: SHIPPED | OUTPATIENT
Start: 2024-09-24

## 2024-09-24 NOTE — TELEPHONE ENCOUNTER
Refill passed per [facility] protocol.  Tosin Reardon, DEMARIO18 hours ago (12:58 PM)     LARISSA  Patient returned our call.   Patient's date of birth and full name both confirmed.      He states he takes indomethacin daily - to prevent a gout flare-ups.   Per patient, he is almost out of medication.   And also verbalizes concern about its impact on his kidneys.      Per patient, she does not currently have a gout flare up, and denies symptoms of it now.      RN advised appointment to discuss treatment plan.   He verbalizes understanding of all information, and agreeable to plan.                Future Appointments   Date Time Provider Department Center   9/25/2024  2:00 PM Aylin Archer MD [facility]   9/30/2024 11:30 AM LMB CT RM1 [facility] MOB. CT EM [place]   10/15/2024  4:00 PM Davin Corrigan DO [facility]             Requested Prescriptions   Pending Prescriptions Disp Refills    INDOMETHACIN 50 MG Oral Cap [Pharmacy Med Name: INDOMETHACIN 50MG CAPSULES] 180 capsule 1     Sig: TAKE 1 CAPSULE(50 MG) BY MOUTH TWICE DAILY WITH MEALS       Non-Narcotic Pain Medication Protocol Passed - 9/16/2024  8:22 PM        Passed - In person appointment or virtual visit in the past 6 mos or appointment in next 3 mos     Recent Outpatient Visits              1 month ago Type 2 diabetes mellitus without complication, without long-term current use of insulin (HCC)    [facility], [facility] Davin Corrigan,     Office Visit    5 months ago Hospital discharge follow-up    [facility] Nette Rangel APRN    Office Visit    11 months ago Medicare annual wellness visit, initial    [facility] Davin Corrigan,     Office Visit    1 year ago Personal history of prostate cancer    [facility] Davin Corrigan,     Office Visit    1 year ago Uncontrolled  type 2 diabetes mellitus with hyperglycemia (HCC)    [facility], [facility], [place] Diann Hines, APRN    Office Visit          Future Appointments         Provider Department Appt Notes    Tomorrow Aylin Archer MD [facility], [facility]     In 6 days LMB CT RM1 [facility] CT - [place]     In 3 weeks Davin Corrigan DO [facility]                       Recent Outpatient Visits              1 month ago Type 2 diabetes mellitus without complication, without long-term current use of insulin (HCC)    [facility] Davin Corrigan,     Office Visit    5 months ago Hospital discharge follow-up    [facility] Nette Rangel APRN    Office Visit    11 months ago Medicare annual wellness visit, initial    [facility] Davin Corrigan DO    Office Visit    1 year ago Personal history of prostate cancer    [facility] Davin Corrigan,     Office Visit    1 year ago Uncontrolled type 2 diabetes mellitus with hyperglycemia (HCC)    [facility], [facility], [place] Diann Hines, APRN    Office Visit          Future Appointments         Provider Department Appt Notes    Tomorrow Aylin Acrher MD [facility]     In 6 days LMB CT RM1 [facility] CT - [place]     In 3 weeks Davin Corrigan DO [facility]

## 2024-10-01 ENCOUNTER — HOSPITAL ENCOUNTER (OUTPATIENT)
Dept: CT IMAGING | Age: 59
Discharge: HOME | End: 2024-10-01
Attending: FAMILY MEDICINE
Payer: MEDICARE

## 2024-10-01 DIAGNOSIS — N94.89 ADNEXAL MASS: ICD-10-CM

## 2024-10-01 DIAGNOSIS — R93.89 ABNORMAL FINDING OF DIAGNOSTIC IMAGING: ICD-10-CM

## 2024-10-01 DIAGNOSIS — K63.89 MASS OF CECUM: ICD-10-CM

## 2024-10-01 PROCEDURE — 74177 CT ABD & PELVIS W/CONTRAST: CPT | Performed by: FAMILY MEDICINE

## 2024-10-15 DIAGNOSIS — M54.2 CERVICALGIA: ICD-10-CM

## 2024-10-17 NOTE — TELEPHONE ENCOUNTER
Please review. Protocol Failed; No Protocol    Requested Prescriptions   Pending Prescriptions Disp Refills    AMITRIPTYLINE 25 MG Oral Tab [Pharmacy Med Name: AMITRIPTYLINE 25MG TABLETS] 180 tablet 0     Sig: TAKE 1 TABLET BY MOUTH EVERY MORNING AND 1 TABLET BY MOUTH BEFORE BEDTIME       There is no refill protocol information for this order            Future Appointments         Provider Department Appt Notes    In 4 days Davin Corrigan,  [facility], [address], [place] Medicare annual          Recent Outpatient Visits              2 months ago Type 2 diabetes mellitus without complication, without long-term current use of insulin (HCC)    [facility], [facility] Davin Corrigan,     Office Visit    6 months ago Hospital discharge follow-up    [facility], [facility] Nette Rangel APRN    Office Visit    1 year ago Medicare annual wellness visit, initial    [facility], [facility] Davin Corrigan,     Office Visit    1 year ago Personal history of prostate cancer    [facility] Davin Corrigan,     Office Visit    1 year ago Uncontrolled type 2 diabetes mellitus with hyperglycemia (HCC)    [facility], [facility]    Office Visit

## 2024-10-21 ENCOUNTER — LAB ENCOUNTER (OUTPATIENT)
Dept: LAB | Age: 59
End: 2024-10-21
Attending: FAMILY MEDICINE
Payer: MEDICARE

## 2024-10-21 ENCOUNTER — OFFICE VISIT (OUTPATIENT)
Dept: FAMILY MEDICINE CLINIC | Facility: CLINIC | Age: 59
End: 2024-10-21

## 2024-10-21 VITALS
OXYGEN SATURATION: 97 % | HEIGHT: 68.5 IN | TEMPERATURE: 98 F | SYSTOLIC BLOOD PRESSURE: 120 MMHG | WEIGHT: 243 LBS | BODY MASS INDEX: 36.41 KG/M2 | HEART RATE: 97 BPM | DIASTOLIC BLOOD PRESSURE: 83 MMHG

## 2024-10-21 DIAGNOSIS — E11.3291 CONTROLLED TYPE 2 DIABETES MELLITUS WITH RIGHT EYE AFFECTED BY MILD NONPROLIFERATIVE RETINOPATHY WITHOUT MACULAR EDEMA, WITH LONG-TERM CURRENT USE OF INSULIN (HCC): ICD-10-CM

## 2024-10-21 DIAGNOSIS — R07.9 CHEST PAIN, UNSPECIFIED TYPE: ICD-10-CM

## 2024-10-21 DIAGNOSIS — Z28.21 VARICELLA ZOSTER VIRUS (VZV) VACCINATION DECLINED: ICD-10-CM

## 2024-10-21 DIAGNOSIS — Z00.00 MEDICARE ANNUAL WELLNESS VISIT, SUBSEQUENT: Primary | ICD-10-CM

## 2024-10-21 DIAGNOSIS — Z79.4 CONTROLLED TYPE 2 DIABETES MELLITUS WITH RIGHT EYE AFFECTED BY MILD NONPROLIFERATIVE RETINOPATHY WITHOUT MACULAR EDEMA, WITH LONG-TERM CURRENT USE OF INSULIN (HCC): ICD-10-CM

## 2024-10-21 DIAGNOSIS — Z28.21 TETANUS, DIPHTHERIA, AND ACELLULAR PERTUSSIS (TDAP) VACCINATION DECLINED: ICD-10-CM

## 2024-10-21 DIAGNOSIS — I10 HYPERTENSION, UNSPECIFIED TYPE: ICD-10-CM

## 2024-10-21 DIAGNOSIS — E04.1 THYROID NODULE: ICD-10-CM

## 2024-10-21 DIAGNOSIS — B35.1 ONYCHOMYCOSIS: ICD-10-CM

## 2024-10-21 DIAGNOSIS — E78.5 HYPERLIPIDEMIA, UNSPECIFIED HYPERLIPIDEMIA TYPE: ICD-10-CM

## 2024-10-21 DIAGNOSIS — N94.89 ADNEXAL MASS: ICD-10-CM

## 2024-10-21 DIAGNOSIS — E11.9 TYPE 2 DIABETES MELLITUS WITHOUT COMPLICATION, WITHOUT LONG-TERM CURRENT USE OF INSULIN (HCC): ICD-10-CM

## 2024-10-21 DIAGNOSIS — Z85.46 PERSONAL HISTORY OF PROSTATE CANCER: ICD-10-CM

## 2024-10-21 DIAGNOSIS — Z28.21 PNEUMOCOCCAL VACCINATION DECLINED BY PATIENT: ICD-10-CM

## 2024-10-21 DIAGNOSIS — Z28.21 INFLUENZA VACCINATION DECLINED BY PATIENT: ICD-10-CM

## 2024-10-21 DIAGNOSIS — Z00.00 MEDICARE ANNUAL WELLNESS VISIT, SUBSEQUENT: ICD-10-CM

## 2024-10-21 LAB
ALBUMIN SERPL-MCNC: 3.9 G/DL (ref 3.2–4.8)
ALBUMIN/GLOB SERPL: 1.6 {RATIO} (ref 1–2)
ALP LIVER SERPL-CCNC: 71 U/L
ALT SERPL-CCNC: 45 U/L
ANION GAP SERPL CALC-SCNC: 6 MMOL/L (ref 0–18)
AST SERPL-CCNC: 23 U/L (ref ?–34)
BASOPHILS # BLD AUTO: 0.03 X10(3) UL (ref 0–0.2)
BASOPHILS NFR BLD AUTO: 0.5 %
BILIRUB SERPL-MCNC: 1.1 MG/DL (ref 0.2–1.1)
BILIRUB UR QL: NEGATIVE
BUN BLD-MCNC: 10 MG/DL (ref 9–23)
BUN/CREAT SERPL: 9 (ref 10–20)
CALCIUM BLD-MCNC: 9.2 MG/DL (ref 8.7–10.4)
CHLORIDE SERPL-SCNC: 109 MMOL/L (ref 98–112)
CHOLEST SERPL-MCNC: 163 MG/DL (ref ?–200)
CLARITY UR: CLEAR
CO2 SERPL-SCNC: 28 MMOL/L (ref 21–32)
COLOR UR: YELLOW
CREAT BLD-MCNC: 1.11 MG/DL
CREAT UR-SCNC: 132.2 MG/DL
DEPRECATED RDW RBC AUTO: 46.2 FL (ref 35.1–46.3)
EGFRCR SERPLBLD CKD-EPI 2021: 76 ML/MIN/1.73M2 (ref 60–?)
EOSINOPHIL # BLD AUTO: 0.15 X10(3) UL (ref 0–0.7)
EOSINOPHIL NFR BLD AUTO: 2.5 %
ERYTHROCYTE [DISTWIDTH] IN BLOOD BY AUTOMATED COUNT: 13.8 % (ref 11–15)
EST. AVERAGE GLUCOSE BLD GHB EST-MCNC: 148 MG/DL (ref 68–126)
FASTING PATIENT LIPID ANSWER: YES
FASTING STATUS PATIENT QL REPORTED: YES
GLOBULIN PLAS-MCNC: 2.4 G/DL (ref 2–3.5)
GLUCOSE BLD-MCNC: 103 MG/DL (ref 70–99)
GLUCOSE UR-MCNC: NORMAL MG/DL
HBA1C MFR BLD: 6.8 % (ref ?–5.7)
HCT VFR BLD AUTO: 41.5 %
HDLC SERPL-MCNC: 57 MG/DL (ref 40–59)
HGB BLD-MCNC: 13.7 G/DL
HGB UR QL STRIP.AUTO: NEGATIVE
HYALINE CASTS #/AREA URNS AUTO: PRESENT /LPF
IMM GRANULOCYTES # BLD AUTO: 0.02 X10(3) UL (ref 0–1)
IMM GRANULOCYTES NFR BLD: 0.3 %
KETONES UR-MCNC: NEGATIVE MG/DL
LDLC SERPL CALC-MCNC: 89 MG/DL (ref ?–100)
LEUKOCYTE ESTERASE UR QL STRIP.AUTO: NEGATIVE
LYMPHOCYTES # BLD AUTO: 1.54 X10(3) UL (ref 1–4)
LYMPHOCYTES NFR BLD AUTO: 26 %
MCH RBC QN AUTO: 30.2 PG (ref 26–34)
MCHC RBC AUTO-ENTMCNC: 33 G/DL (ref 31–37)
MCV RBC AUTO: 91.4 FL
MICROALBUMIN UR-MCNC: 131.6 MG/DL
MICROALBUMIN/CREAT 24H UR-RTO: 995.5 UG/MG (ref ?–30)
MONOCYTES # BLD AUTO: 0.48 X10(3) UL (ref 0.1–1)
MONOCYTES NFR BLD AUTO: 8.1 %
NEUTROPHILS # BLD AUTO: 3.71 X10 (3) UL (ref 1.5–7.7)
NEUTROPHILS # BLD AUTO: 3.71 X10(3) UL (ref 1.5–7.7)
NEUTROPHILS NFR BLD AUTO: 62.6 %
NITRITE UR QL STRIP.AUTO: NEGATIVE
NONHDLC SERPL-MCNC: 106 MG/DL (ref ?–130)
OSMOLALITY SERPL CALC.SUM OF ELEC: 295 MOSM/KG (ref 275–295)
PH UR: 6 [PH] (ref 5–8)
PLATELET # BLD AUTO: 219 10(3)UL (ref 150–450)
POTASSIUM SERPL-SCNC: 3.9 MMOL/L (ref 3.5–5.1)
PROT SERPL-MCNC: 6.3 G/DL (ref 5.7–8.2)
PROT UR-MCNC: 200 MG/DL
RBC # BLD AUTO: 4.54 X10(6)UL
SODIUM SERPL-SCNC: 143 MMOL/L (ref 136–145)
SP GR UR STRIP: 1.01 (ref 1–1.03)
TRIGL SERPL-MCNC: 90 MG/DL (ref 30–149)
TSI SER-ACNC: 1.35 MIU/ML (ref 0.55–4.78)
UROBILINOGEN UR STRIP-ACNC: NORMAL
VLDLC SERPL CALC-MCNC: 14 MG/DL (ref 0–30)
WBC # BLD AUTO: 5.9 X10(3) UL (ref 4–11)

## 2024-10-21 PROCEDURE — 80061 LIPID PANEL: CPT

## 2024-10-21 PROCEDURE — 82043 UR ALBUMIN QUANTITATIVE: CPT

## 2024-10-21 PROCEDURE — 81001 URINALYSIS AUTO W/SCOPE: CPT

## 2024-10-21 PROCEDURE — 80053 COMPREHEN METABOLIC PANEL: CPT

## 2024-10-21 PROCEDURE — 84443 ASSAY THYROID STIM HORMONE: CPT

## 2024-10-21 PROCEDURE — 83036 HEMOGLOBIN GLYCOSYLATED A1C: CPT

## 2024-10-21 PROCEDURE — 36415 COLL VENOUS BLD VENIPUNCTURE: CPT

## 2024-10-21 PROCEDURE — 85025 COMPLETE CBC W/AUTO DIFF WBC: CPT

## 2024-10-21 PROCEDURE — 82570 ASSAY OF URINE CREATININE: CPT

## 2024-10-21 NOTE — PATIENT INSTRUCTIONS
All adult screening ordered and done appropriate for patient's age and gender and risk factors and complaints.  Medication reviewed and renewed where needed and appropriate.  Comply with medications.  Monitor blood pressures and record at home. Limit salt intake.  Recommend weight loss via daily exercising and consistent healthy dietary changes.  Encouraged physical fitness and daily physical activity daily.  Thyroid US ordered.  Stress test ordered in lieu of chest pain and cardiac risks.

## 2024-10-21 NOTE — PROGRESS NOTES
Subjective:     Patient ID: Teodoro Machado is a 59 year old male.    59 year old AA hypertensive, diabetic, personal history of prostate CA AA male here for complete preventive care physical and for status update on any confirmed chronic medical illnesses and follow up on any previous labs or procedures that were suggestive or in need of further work up. Colonoscopy is current. Bowel and bladder functions are intact.    Compliant with medication.    Denies chest pain, SOB, headache, dizziness, acute visual changes, exertional fatigue, urinary frequency, or excessive hunger and thirst.    Declines zoster, influenza, pneumococcal, and/or tetanus.    Patient needs podiatry referral for onychomycosis.    CT of the abdomen/pelvis reveals that there is no pelvic or adrenal mass.  There is a simple cyst seen in the right kidney, however the area in question turns out to be a reservoir of fluid connected to the patient's prosthesis.    Patient due for diabetic foot exam-done during this encounter.    Patient also needs a follow-up on known thyroid nodule.    Patient complains of intermittent/random chest discomfort not associated with position or certain activity.  Chest pain not associated with exertion.  There does not appear to be a pattern or any predictability as to what causes this discomfort.  The patient is at risk as she is hypertensive and diabetic and obese.          History/Other:   Review of Systems  Current Outpatient Medications   Medication Sig Dispense Refill    Zoster Vac Recomb Adjuvanted 50 MCG/0.5ML Intramuscular Recon Susp Inject 50 mcg into the muscle once for 1 dose. Please administer vaccine at pharmacy 1 each 1    amitriptyline 25 MG Oral Tab Take 1 tablet (25 mg total) by mouth 2 (two) times daily. 180 tablet 0    indomethacin 50 MG Oral Cap Take 1 capsule (50 mg total) by mouth 2 (two) times daily with meals. 180 capsule 1    semaglutide (OZEMPIC, 1 MG/DOSE,) 4 MG/3ML Subcutaneous Solution  Pen-injector Inject 1 mg into the skin every 7 days. 9 mL 1    atorvastatin 40 MG Oral Tab Take 1 tablet (40 mg total) by mouth nightly. 90 tablet 3    baclofen 10 MG Oral Tab Take 1 tablet (10 mg total) by mouth 3 (three) times daily. 270 tablet 3    carvedilol 12.5 MG Oral Tab Take 1 tablet (12.5 mg total) by mouth 2 (two) times daily with meals. 180 tablet 3    POTASSIUM CHLORIDE ER 8 MEQ Oral Tab CR TAKE 2 TABLETS(16 MEQ) BY MOUTH TWICE DAILY 360 tablet 2    tamsulosin 0.4 MG Oral Cap Take 1 capsule (0.4 mg total) by mouth daily. 90 capsule 3    sulfamethoxazole-trimethoprim -160 MG Oral Tab per tablet Take 1 tablet by mouth 2 (two) times daily. 14 tablet 0    montelukast 10 MG Oral Tab Take 1 tablet (10 mg total) by mouth nightly. 90 tablet 3    Glucose Blood (ONETOUCH ULTRA) In Vitro Strip TEST BLOOD SUGAR THREE TIMES DAILY AND ONCE AT NIGHT 300 strip 3    insulin NPH & regular 70/30 (HUMULIN 70/30) (70-30) 100 Units/mL Subcutaneous Suspension daily as needed.      fluticasone propionate 50 MCG/ACT Nasal Suspension 2 sprays by Nasal route daily as needed (CONGESTION).  0    brimonidine 0.2 % Ophthalmic Solution Place 1 drop into both eyes 2 (two) times daily.      LATANOPROST 0.005 % Ophthalmic Solution INSTILL 1 DROP IN BOTH EYES DAILY 10 mL 2    DORZOLAMIDE HCL 2 % Ophthalmic Solution INSTILL 1 DROP IN BOTH EYES TWICE DAILY 10 mL 6    Vitamin D3, Cholecalciferol, 10 MCG (400 UNIT) Oral Tab Take 1 tablet (400 Units total) by mouth daily.      Insulin Syringe 31G X 5/16\" 0.5 ML Does not apply Misc AS DIRECTED TWICE DAILY 100 each 5    Vitamin C 500 MG Oral Tab Take 1 tablet (500 mg total) by mouth daily.      TRUEPLUS LANCETS 33G Does not apply Misc Check sugars 2-3 times a day. 100 each 3     Allergies:Allergies[1]    Past Medical History:    Age-related nuclear cataract of both eyes    Arthritis    Cervical spine disease    \"cervical spine disease. surgical 3/05776\" per NG     Congestive heart disease  (HCC)    Diabetes (HCC)    Diabetes mellitus (HCC)    Diabetes mellitus type 2 with complications (HCC)    diabetes mellitus, type 2 with comp.  Insulin    Esophageal reflux    Glaucoma    1/14/21 1st visit with RJM- patient has DX of glaucoma and is on Latanoprost qhs OU, Dorzolamide bid OU and Timolol bid OU- being treated at [facility]- seeing JORGITO for DM EE only     Obesity, unspecified    Other and unspecified hyperlipidemia    Unspecified essential hypertension      Past Surgical History:   Procedure Laterality Date    Back surgery  \"2011\"     \"3/2011 surgical.  cervical spine disease\"     Back surgery      cervical fusion    Colonoscopy N/A 11/16/2021    Procedure: COLONOSCOPY;  Surgeon: Aj Ordoñez MD;  Location: [facility] ENDOSCOPY    Hc arthrocentesis or inject intermed joint w/o us Bilateral     carpal tunnel injections bilaterally.  Jorge Moise MD    Hc inject single multiple trigger point 1 or 2 musc      Trigger Point Injections [SITES, # SITES NOT STATED]. provider: Davin Corrigan    Other surgical history  01/03/2022    IPP, Dr. Sanchez    Repair rotator cuff,acute Right 6/13    Spine surgery procedure unlisted      c3-c7 cervical fusion       Family History   Problem Relation Age of Onset    Prostate Cancer Father     Hypertension Father     Diabetes Father     Cancer Father         bladder    Glaucoma Father     Diabetes Mother     Stroke Mother         CVA(stroke)    Cancer Maternal Aunt 88        stomach (cause of death)    Macular degeneration Neg       Social History:   Social History     Socioeconomic History    Marital status:     Number of children: 2   Tobacco Use    Smoking status: Never    Smokeless tobacco: Never   Vaping Use    Vaping status: Never Used   Substance and Sexual Activity    Alcohol use: Yes     Alcohol/week: 0.0 standard drinks of alcohol     Comment: Very rare    Drug use: No    Sexual activity: Not Currently   Other Topics Concern    Caffeine  Concern Yes     Comment: rarely     Exercise Yes     Comment: therapy   Social History Narrative    The patient does not use an assistive device..      The patient does live in a home with stairs.     Social Drivers of Health     Financial Resource Strain: Patient Declined (3/4/2024)    Received from [facility]    Overall Financial Resource Strain (CARDIA)     Difficulty of Paying Living Expenses: Patient declined   Food Insecurity: No Food Insecurity (3/4/2024)    Received from [facility]    Hunger Vital Sign     Worried About Running Out of Food in the Last Year: Never true     Ran Out of Food in the Last Year: Never true   Transportation Needs: No Transportation Needs (3/4/2024)    Received from [facility]    PRAPARE - Transportation     Lack of Transportation (Medical): No     Lack of Transportation (Non-Medical): No   Physical Activity: Inactive (3/4/2024)    Received from [facility]    Exercise Vital Sign     Days of Exercise per Week: 0 days     Minutes of Exercise per Session: 0 min   Stress: Stress Concern Present (3/4/2024)    Received from [facility]    [employer] - Occupational Stress Questionnaire     Feeling of Stress : To some extent   Social Connections: Moderately Isolated (3/4/2024)    Received from [facility]    Social Connection and Isolation Panel [NHANES]     Frequency of Communication with Friends and Family: More than three times a week     Frequency of Social Gatherings with Friends and Family: Three times a week     Attends Oriental orthodox Services: 1 to 4 times per year     Active Member of Clubs or Organizations: No     Attends Club or Organization Meetings:  Never     Marital Status:    Housing Stability: Low Risk  (3/4/2024)    Received from [facility]    Housing Stability Vital Sign     Unable to Pay for Housing in the Last Year: No     Number of Places Lived in the Last Year: 1     Unstable Housing in the Last Year: No        Teodoro Machado's SCREENING SCHEDULE   Tests on this list are recommended by your physician but may not be covered, or covered at this frequency, by your insurer. Please check with your insurance carrier before scheduling to verify coverage.    PREVENTATIVE SERVICES  INDICATIONS AND SCHEDULE Internal Lab or Procedure External Lab or Procedure   Diabetes Screening      HbgA1C   Annually HEMOGLOBIN A1C (%)   Date Value   08/14/2024 6.3 (A)     HgbA1C (%)   Date Value   10/21/2024 6.8 (H)         No data to display                Fasting Blood Sugar (FSB) Annually Glucose (mg/dL)   Date Value   10/21/2024 103 (H)     GLUCOSE (mg/dL)   Date Value   10/03/2023 117 (H)       Cardiovascular Disease Screening     LDL Annually LDL Cholesterol (mg/dL)   Date Value   10/21/2024 89     LDL-CHOLESTEROL (mg/dL (calc))   Date Value   06/12/2023 61        EKG One Time      Colorectal Cancer Screening      Colonoscopy Screen every 10 years Health Maintenance   Topic Date Due    Colorectal Cancer Screening  11/16/2025    Update Health Maintenance if applicable    Flex Sigmoidoscopy Screen every 5 years No results found for this or any previous visit.      No data to display                 Fecal Occult Blood Annually No results found for: \"FOB\", \"OCCULTSTOOL\"      No data to display                Glaucoma Screening      Ophthalmology Visit Annually      Prostate Cancer Screening      PSA  Annually Health Maintenance   Topic Date Due    PSA  10/03/2025     Update Health Maintenance if applicable   Immunizations      Influenza No orders found for this or any previous visit. Update Immunization Activity if  applicable    Pneumococcal No orders found for this or any previous visit. Update Immunization Activity if applicable    Hepatitis B No orders found for this or any previous visit. Update Immunization Activity if applicable    Tetanus Orders placed or performed in visit on 08/14/24    TETANUS, DIPHTHERIA TOXOIDS AND ACELLULAR PERTUSIS VACCINE (TDAP), >7 YEARS, IM USE    Update Immunization Activity if applicable    Zoster (Not covered by Medicare Part B) No orders found for this or any previous visit. Update Immunization Activity if applicable     SPECIFIC DISEASE MONITORING Internal Lab or Procedure External Lab or Procedure   Annual Monitoring of Persistent     Medications (ACE/ARB, digoxin, diuretics)    Potassium  Annually Potassium (mmol/L)   Date Value   10/21/2024 3.9     POTASSIUM (mmol/L)   Date Value   10/03/2023 3.7         No data to display                Creatinine  Annually CREATININE (mg/dL)   Date Value   10/03/2023 1.13     Creatinine (mg/dL)   Date Value   10/21/2024 1.11         No data to display                Digoxin Serum Conc  Annually No results found for: \"DIGOXIN\"      No data to display                Diabetes      HgbA1C  Annually HEMOGLOBIN A1C (%)   Date Value   08/14/2024 6.3 (A)     HgbA1C (%)   Date Value   10/21/2024 6.8 (H)         No data to display                Creat/alb ratio  Annually      LDL  Annually LDL Cholesterol (mg/dL)   Date Value   10/21/2024 89     LDL-CHOLESTEROL (mg/dL (calc))   Date Value   06/12/2023 61         No data to display                 Dilated Eye exam  Annually     5/2/2024     3:38 PM   Data entered on:   Last Dilated Eye Exam 3/14/2024          No data to display                COPD      Spirometry Testing Annually No results found for this or any previous visit.      No data to display                    General Health     In the past six months, have you lost more than 10 pounds without trying?: 2 - No    Has your appetite been poor?:  No    Type of Diet: Balanced    How does the patient maintain a good energy level?: Appropriate Exercise    How would you describe your daily physical activity?: Moderate    How would you describe your current health state?: Fair    How do you maintain positive mental well-being?: Social Interaction         Have you had any immunizations at another office such as Influenza, Hepatitis B, Tetanus, or Pneumococcal?: No     Functional Ability     Bathing or Showering: Able without help    Toileting: Able without help    Dressing: Able without help    Eating: Able without help    Driving: Able without help    Preparing your meals: Able without help    Managing money/bills: Able without help    Taking medications as prescribed: Able without help    Are you able to afford your medications?: Yes    Hearing Problems?: No     Functional Status     Hearing Problems?: No         Difficulty walking?: Yes    Difficulty dressing or bathing?: Yes    Problems with daily activities? : No    Memory Problems?: No      Fall/Risk Assessment                                                              Depression Screening (PHQ-2/PHQ-9): Over the LAST 2 WEEKS                      Advance Directives     Do you have a healthcare power of ?: Yes    Do you have a living will?: No     Hearing Assessment (Required for AWV/SWV)      Hearing Screening    Time taken: 10/21/2024  1:45 PM  Entry User: [employer]  Screening Method: Finger Rub  Finger Rub Result: Pass         Visual Acuity     Right Eye Visual Acuity: Uncorrected Left Eye Visual Acuity: Uncorrected   Right Eye Chart Acuity: 20/200 Left Eye Chart Acuity: 20/25     Cognitive Assessment     What day of the week is this?: Correct    What month is it?: Correct    What year is it?: Correct    Recall \"Ball\": Correct    Recall \"Flag\": Correct    Recall \"Tree\": Correct          Objective:   Vitals:    10/21/24 1419   BP: 120/83   Pulse:    Temp:        Physical  Exam  Constitutional:       General: He is not in acute distress.     Appearance: He is obese. He is not ill-appearing.   HENT:      Head: Normocephalic and atraumatic.      Right Ear: Tympanic membrane normal.      Left Ear: Tympanic membrane normal.      Nose: Nose normal.      Mouth/Throat:      Mouth: Mucous membranes are moist.   Neck:      Thyroid: No thyromegaly.   Cardiovascular:      Rate and Rhythm: Normal rate and regular rhythm.      Heart sounds:      No gallop.   Pulmonary:      Effort: Pulmonary effort is normal.      Breath sounds: Normal breath sounds.   Abdominal:      General: There is no distension.      Palpations: Abdomen is soft. There is no mass.   Feet:      Comments: Bilateral barefoot skin diabetic exam is normal, visualized feet and the appearance is normal.  Bilateral monofilament/sensation of both feet is normal.  Pulsation pedal pulse exam of both lower legs/feet is normal as well.        Neurological:      General: No focal deficit present.      Mental Status: He is alert.         Assessment & Plan:   1. Medicare annual wellness visit, subsequent  Survey completed.  The following labs have been ordered.  - CBC W Differential W Platelet [E]; Future  - Comp Metabolic Panel (14) [E]; Future  - Lipid Panel [E]; Future  - TSH [E]; Future  - Urinalysis, Routine [E]; Future    2. 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)  Diabetic status update on today.  - Microalb/Creat Ratio, Random Urine [E]; Future  - Hemoglobin A1C [E]; Future; A1c is 6.8.  This is good.    3. Hypertension, unspecified type  Blood pressure measures to goal and measured manually by physician.    4. Hyperlipidemia, unspecified hyperlipidemia type  Status update today.    5. Personal history of prostate cancer  In remission.    6. Varicella zoster virus (VZV) vaccination declined  Declined.  - Zoster  Vac Recomb Adjuvanted 50 MCG/0.5ML Intramuscular Recon Susp; Inject 50 mcg into the muscle once for 1 dose. Please administer vaccine at pharmacy  Dispense: 1 each; Refill: 1    7. Influenza vaccination declined by patient  Declined.  - Fluzone trivalent vaccine, PF 0.5mL, 6mo+ (63980)    8. Adnexal mass  No adrenal mass.  Fluid-filled reservoir for the penile prosthesis confirmed by CT of abdomen/pelvis.    9. Tetanus, diphtheria, and acellular pertussis (Tdap) vaccination declined  Declined by patient.    10. Pneumococcal vaccination declined by patient  Declined by patient.    11. Onychomycosis  Referred.  - Podiatry Referral - In Network    12. Thyroid nodule  Ordered.  - US THYROID (CPT=76536); Future    13. Chest pain, unspecified type  With hypertension and diabetes and general complaint of chest discomfort, treadmill recommend.  Patient at risk for coronary artery disease.  Calcium CT scan also an option for CAD assessment.  - CARD TREADMILL STRESS, ADULT (CPT=93017); Future      Orders Placed This Encounter   Procedures    CBC W Differential W Platelet [E]    Comp Metabolic Panel (14) [E]    Lipid Panel [E]    TSH [E]    Urinalysis, Routine [E]    Microalb/Creat Ratio, Random Urine [E]    Fluzone trivalent vaccine, PF 0.5mL, 6mo+ (23931)       Meds This Visit:  Requested Prescriptions     Signed Prescriptions Disp Refills    Zoster Vac Recomb Adjuvanted 50 MCG/0.5ML Intramuscular Recon Susp 1 each 1     Sig: Inject 50 mcg into the muscle once for 1 dose. Please administer vaccine at pharmacy       Imaging & Referrals:  INFLUENZA VACCINE, TRI, PRESERV FREE, 0.5 ML     Patient Instructions   All adult screening ordered and done appropriate for patient's age and gender and risk factors and complaints.  Medication reviewed and renewed where needed and appropriate.  Comply with medications.  Monitor blood pressures and record at home. Limit salt intake.  Recommend weight loss via daily exercising and consistent  healthy dietary changes.  Encouraged physical fitness and daily physical activity daily.  Thyroid US ordered.  Stress test ordered in lieu of chest pain and cardiac risks.    Return in about 1 year (around 10/21/2025), or if symptoms worsen or fail to improve.         [1]   Allergies  Allergen Reactions    Seasonal

## 2024-10-23 DIAGNOSIS — E11.9 TYPE 2 DIABETES MELLITUS WITHOUT COMPLICATION, WITHOUT LONG-TERM CURRENT USE OF INSULIN (HCC): ICD-10-CM

## 2024-10-25 RX ORDER — BLOOD SUGAR DIAGNOSTIC
STRIP MISCELLANEOUS
Qty: 300 STRIP | Refills: 3 | Status: SHIPPED | OUTPATIENT
Start: 2024-10-25

## 2024-10-25 NOTE — TELEPHONE ENCOUNTER
REFILL PASSED PER [facility] PROTOCOLS    Requested Prescriptions   Pending Prescriptions Disp Refills    ONETOUCH ULTRA In Vitro Strip [Pharmacy Med Name: ONE TOUCH ULTRA BLUE TESTST(NEW)100] 300 strip 3     Sig: TEST BLOOD SUGAR THREE TIMES DAILY AND ONCE AT NIGHT       Diabetic Supplies Protocol Passed - 10/25/2024  1:47 PM        Passed - In person appointment or virtual visit in the past 12 mos or appointment in next 3 mos     Recent Outpatient Visits              4 days ago Medicare annual wellness visit, subsequent    [facility], [facility] Davin Corrigan, DO    Office Visit    2 months ago Type 2 diabetes mellitus without complication, without long-term current use of insulin (HCC)    [facility], [facility] Davin Corrigan,     Office Visit    6 months ago Hospital discharge follow-up    [facility] Nette Rangel APRN    Office Visit    1 year ago Medicare annual wellness visit, initial    [facility] Davin Corrigan,     Office Visit    1 year ago Personal history of prostate cancer    [facility] Davin Corrigan, DO    Office Visit          Future Appointments         Provider Department Appt Notes    In 1 month Asaf Castro DPM [facility]  informed to bring id/ins                         Future Appointments         Provider Department Appt Notes    In 1 month Asaf Castro DPM [facility]  informed to bring id/ins          Recent Outpatient Visits              4 days ago Medicare annual wellness visit, subsequent    [facility] Davin Corrigan,     Office Visit    2 months ago Type 2 diabetes mellitus without complication,  without long-term current use of insulin (HCC)    [facility] [address] [place] Corrigan, Davin J, DO    Office Visit    6 months ago [facility] follow-up    [facility]    Office Visit    1 year ago Medicare annual wellness visit, initial    [facility] [address], [place] Corrigan, Davin J,     Office Visit    1 year ago Personal history of prostate cancer    [facility] [address], [place] Corrigan, Davin J, DO    Office Visit

## 2024-11-06 ENCOUNTER — HOSPITAL ENCOUNTER (OUTPATIENT)
Dept: ULTRASOUND IMAGING | Age: 59
Discharge: HOME | End: 2024-11-06
Attending: FAMILY MEDICINE
Payer: MEDICARE

## 2024-11-06 DIAGNOSIS — E04.1 THYROID NODULE: ICD-10-CM

## 2024-11-06 PROCEDURE — 76536 US EXAM OF HEAD AND NECK: CPT | Performed by: FAMILY MEDICINE

## 2025-01-13 DIAGNOSIS — M54.2 CERVICALGIA: ICD-10-CM

## 2025-01-16 NOTE — TELEPHONE ENCOUNTER
Please review; protocol failed/No Protocol    Last Office Visit: 10/21/2024    Requested Prescriptions   Pending Prescriptions Disp Refills    AMITRIPTYLINE 25 MG Oral Tab [Pharmacy Med Name: AMITRIPTYLINE 25MG TABLETS] 180 tablet 0     Sig: TAKE 1 TABLET(25 MG) BY MOUTH TWICE DAILY       There is no refill protocol information for this order        Future Appointments         Provider Department Appt Notes    In 2 weeks Tosin Ro DPM [facility] Need full foot exam          Recent Outpatient Visits              2 months ago Medicare annual wellness visit, subsequent    [facility] Davin Corrigan,     Office Visit    5 months ago Type 2 diabetes mellitus without complication, without long-term current use of insulin (HCC)    [facility] [place],     Office Visit    9 months ago Hospital discharge follow-up    [facility] Nette Rangel APRN    Office Visit    1 year ago Medicare annual wellness visit, initial    [facility] [place],     Office Visit    1 year ago Personal history of prostate cancer    [facility] Davin Corrigan, DO    Office Visit

## 2025-01-17 DIAGNOSIS — Z91.09 ENVIRONMENTAL ALLERGIES: ICD-10-CM

## 2025-01-21 NOTE — TELEPHONE ENCOUNTER
Please review. Protocol Failed; No Protocol    Requested Prescriptions   Pending Prescriptions Disp Refills    MONTELUKAST 10 MG Oral Tab [Pharmacy Med Name: MONTELUKAST 10MG TABLETS] 90 tablet 3     Sig: TAKE 1 TABLET(10 MG) BY MOUTH EVERY NIGHT       Asthma & COPD Medication Protocol Failed - 1/21/2025  2:53 PM        Failed - ACT Score greater than or equal to 20                Failed - ACT recorded in the last 12 months                Passed - Appointment in past 6 or next 3 months      Recent Outpatient Visits              3 months ago Medicare annual wellness visit, subsequent    [facility] [place] Davin Corrigan, DO    Office Visit    5 months ago Type 2 diabetes mellitus without complication, without long-term current use of insulin (HCC)    [facility] [place] Davin Corrigan,     Office Visit    9 months ago Hospital discharge follow-up    [facility] [place] Nette Rangel APRN    Office Visit    1 year ago Medicare annual wellness visit, initial    [facility] [place] Davin Corrigan,     Office Visit    1 year ago Personal history of prostate cancer    [facility] [place] Davin Corrigan, DO    Office Visit          Future Appointments         Provider Department Appt Notes    In 1 week Tosin Ro DPM [facility] Need full foot exam                    Passed - Medication is active on med list               Future Appointments         Provider Department Appt Notes    [facility]          Recent Outpatient Visits              3 months ago Medicare annual wellness visit, subsequent    [facility] [place] Davin Corrigan, DO    Office  Visit    5 months ago Type 2 diabetes mellitus without complication, without long-term current use of insulin (HCC)    [facility] [place] Ponce Forrest J,     Office Visit    9 months ago [facility] [place] [facility]    Office Visit    1 year ago Medicare annual wellness visit, initial    [facility] [address] [place] Corrigan, Davin J,     Office Visit    1 year ago Personal history of prostate cancer    [facility] [address], [place] Corrigan, Davin J, DO    Office Visit

## 2025-01-22 RX ORDER — MONTELUKAST SODIUM 10 MG/1
10 TABLET ORAL NIGHTLY
Qty: 90 TABLET | Refills: 3 | Status: SHIPPED | OUTPATIENT
Start: 2025-01-22

## 2025-02-17 DIAGNOSIS — Z85.46 PERSONAL HISTORY OF PROSTATE CANCER: ICD-10-CM

## 2025-02-17 DIAGNOSIS — E78.5 HYPERLIPIDEMIA, UNSPECIFIED HYPERLIPIDEMIA TYPE: ICD-10-CM

## 2025-02-17 DIAGNOSIS — M54.2 CERVICALGIA: ICD-10-CM

## 2025-02-17 DIAGNOSIS — Z91.09 ENVIRONMENTAL ALLERGIES: ICD-10-CM

## 2025-02-17 DIAGNOSIS — I10 HYPERTENSION, UNSPECIFIED TYPE: ICD-10-CM

## 2025-02-21 RX ORDER — MONTELUKAST SODIUM 10 MG/1
10 TABLET ORAL NIGHTLY
Qty: 90 TABLET | Refills: 3 | OUTPATIENT
Start: 2025-02-21

## 2025-02-21 RX ORDER — CARVEDILOL 12.5 MG/1
12.5 TABLET ORAL 2 TIMES DAILY WITH MEALS
Qty: 180 TABLET | Refills: 3 | Status: SHIPPED | OUTPATIENT
Start: 2025-02-21

## 2025-02-21 RX ORDER — ATORVASTATIN CALCIUM 40 MG/1
40 TABLET, FILM COATED ORAL NIGHTLY
Qty: 90 TABLET | Refills: 3 | Status: SHIPPED | OUTPATIENT
Start: 2025-02-21

## 2025-02-21 RX ORDER — BRIMONIDINE TARTRATE 2 MG/ML
1 SOLUTION/ DROPS OPHTHALMIC 2 TIMES DAILY
Refills: 0 | OUTPATIENT
Start: 2025-02-21

## 2025-02-21 RX ORDER — TAMSULOSIN HYDROCHLORIDE 0.4 MG/1
0.4 CAPSULE ORAL DAILY
Qty: 90 CAPSULE | Refills: 3 | Status: SHIPPED | OUTPATIENT
Start: 2025-02-21

## 2025-03-20 ENCOUNTER — TELEPHONE (OUTPATIENT)
Dept: FAMILY MEDICINE CLINIC | Facility: CLINIC | Age: 60
End: 2025-03-20

## 2025-03-28 ENCOUNTER — NURSE TRIAGE (OUTPATIENT)
Dept: FAMILY MEDICINE CLINIC | Facility: CLINIC | Age: 60
End: 2025-03-28
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
## 2025-03-28 NOTE — TELEPHONE ENCOUNTER
Patient scheduled on [facility] for 7/1 with Pamela for:    \"Neck and hip pain \"    Should they be triaged?

## 2025-04-08 ENCOUNTER — OFFICE VISIT (OUTPATIENT)
Dept: PODIATRY CLINIC | Facility: CLINIC | Age: 60
End: 2025-04-08

## 2025-04-08 DIAGNOSIS — B35.1 ONYCHOMYCOSIS: ICD-10-CM

## 2025-04-08 DIAGNOSIS — I10 HYPERTENSION, UNSPECIFIED TYPE: ICD-10-CM

## 2025-04-08 DIAGNOSIS — E11.9 TYPE 2 DIABETES MELLITUS WITHOUT COMPLICATION, WITHOUT LONG-TERM CURRENT USE OF INSULIN (HCC): Primary | ICD-10-CM

## 2025-04-08 DIAGNOSIS — S98.132A AMPUTATION OF TOE OF LEFT FOOT: ICD-10-CM

## 2025-04-08 PROCEDURE — 99204 OFFICE O/P NEW MOD 45 MIN: CPT | Performed by: STUDENT IN AN ORGANIZED HEALTH CARE EDUCATION/TRAINING PROGRAM

## 2025-04-08 NOTE — PROGRESS NOTES
[facility]  Progress Note      Teodoro Machado is a 59 year old male.   Chief Complaint   Patient presents with    Diabetic Foot Care     Consult - last DgL4X=2.8 from 10/21/24 when he was seen by PCP Dr Corrigan - needs his toenails trimmed and feet checked - this AM he did not checked his BS             HPI:     Patient has a insulin diabetic male with history of left partial hallux amputation presents to clinic for bilateral foot evaluation.  Admits to difficulty trimming his own toenails.  Denies any pedal injuries or trauma.  Denies any signs of infection.    Allergies: Seasonal    Current Outpatient Medications   Medication Sig Dispense Refill    tamsulosin 0.4 MG Oral Cap Take 1 capsule (0.4 mg total) by mouth daily. 90 capsule 3    carvedilol 12.5 MG Oral Tab Take 1 tablet (12.5 mg total) by mouth 2 (two) times daily with meals. 180 tablet 3    atorvastatin 40 MG Oral Tab Take 1 tablet (40 mg total) by mouth nightly. 90 tablet 3    montelukast 10 MG Oral Tab Take 1 tablet (10 mg total) by mouth nightly. 90 tablet 3    amitriptyline 25 MG Oral Tab Take 1 tablet (25 mg total) by mouth 2 (two) times daily. 180 tablet 0    Glucose Blood (ONETOUCH ULTRA) In Vitro Strip TEST BLOOD SUGAR THREE TIMES DAILY AND ONCE AT NIGHT 300 strip 3    indomethacin 50 MG Oral Cap Take 1 capsule (50 mg total) by mouth 2 (two) times daily with meals. 180 capsule 1    semaglutide (OZEMPIC, 1 MG/DOSE,) 4 MG/3ML Subcutaneous Solution Pen-injector Inject 1 mg into the skin every 7 days. 9 mL 1    baclofen 10 MG Oral Tab Take 1 tablet (10 mg total) by mouth 3 (three) times daily. 270 tablet 3    POTASSIUM CHLORIDE ER 8 MEQ Oral Tab CR TAKE 2 TABLETS(16 MEQ) BY MOUTH TWICE DAILY 360 tablet 2    sulfamethoxazole-trimethoprim -160 MG Oral Tab per tablet Take 1 tablet by mouth 2 (two) times daily. 14 tablet 0    insulin NPH & regular 70/30 (HUMULIN 70/30) (70-30) 100 Units/mL Subcutaneous Suspension daily as needed.       fluticasone propionate 50 MCG/ACT Nasal Suspension 2 sprays by Nasal route daily as needed (CONGESTION).  0    brimonidine 0.2 % Ophthalmic Solution Place 1 drop into both eyes 2 (two) times daily.      LATANOPROST 0.005 % Ophthalmic Solution INSTILL 1 DROP IN BOTH EYES DAILY 10 mL 2    DORZOLAMIDE HCL 2 % Ophthalmic Solution INSTILL 1 DROP IN BOTH EYES TWICE DAILY 10 mL 6    Vitamin D3, Cholecalciferol, 10 MCG (400 UNIT) Oral Tab Take 1 tablet (400 Units total) by mouth daily.      Insulin Syringe 31G X 5/16\" 0.5 ML Does not apply Misc AS DIRECTED TWICE DAILY 100 each 5    Vitamin C 500 MG Oral Tab Take 1 tablet (500 mg total) by mouth daily.      TRUEPLUS LANCETS 33G Does not apply Misc Check sugars 2-3 times a day. 100 each 3      Past Medical History:    Age-related nuclear cataract of both eyes    Arthritis    Cervical spine disease    \"cervical spine disease. surgical 3/10089\" per NG     Congestive heart disease (HCC)    Diabetes (HCC)    Diabetes mellitus (HCC)    Diabetes mellitus type 2 with complications (HCC)    diabetes mellitus, type 2 with comp.  Insulin    Esophageal reflux    Glaucoma    1/14/21 1st visit with JORGITO- patient has DX of glaucoma and is on Latanoprost qhs OU, Dorzolamide bid OU and Timolol bid OU- being treated at [facility]- seeing JORGITO for DM EE only     Obesity, unspecified    Other and unspecified hyperlipidemia    Unspecified essential hypertension      Past Surgical History:   Procedure Laterality Date    Back surgery  \"2011\"     \"3/2011 surgical.  cervical spine disease\"     Back surgery      cervical fusion    Colonoscopy N/A 11/16/2021    Procedure: COLONOSCOPY;  Surgeon: Aj Ordoñez MD;  Location: [facility] ENDOSCOPY    Hc arthrocentesis or inject intermed joint w/o us Bilateral     carpal tunnel injections bilaterally.  Jorge Moise MD    Hc inject single multiple trigger point 1 or 2 musc      Trigger Point Injections [SITES, # SITES NOT STATED]. provider:  Davin Corrigan    Other surgical history  01/03/2022    IPP, Dr. Sanchez    Repair rotator cuff,acute Right 6/13    Spine surgery procedure unlisted      c3-c7 cervical fusion       Family History   Problem Relation Age of Onset    Prostate Cancer Father     Hypertension Father     Diabetes Father     Cancer Father         bladder    Glaucoma Father     Diabetes Mother     Stroke Mother         CVA(stroke)    Cancer Maternal Aunt 88        stomach (cause of death)    Macular degeneration Neg       Social History     Socioeconomic History    Marital status:     Number of children: 2   Tobacco Use    Smoking status: Never    Smokeless tobacco: Never   Vaping Use    Vaping status: Never Used   Substance and Sexual Activity    Alcohol use: Yes     Alcohol/week: 0.0 standard drinks of alcohol     Comment: Very rare    Drug use: No    Sexual activity: Not Currently   Other Topics Concern    Caffeine Concern Yes     Comment: rarely     Exercise Yes     Comment: therapy           REVIEW OF SYSTEMS:     Denies nause, fever, chills  No calf pain  Denies chest pain or SOB      EXAM:   There were no vitals taken for this visit.  GENERAL: well developed, well nourished, in no apparent distress  EXTREMITIES:   1. Integument: Normal skin temperature and turgor. Toenails x 9 are elongated, thickened and discolored with subungal derbi.     2. Vascular: Dorsalis pedis two out of four bilateral and posterior tibial pulses two out of   four bilateral, capillary refill normal.   3. Musculoskeletal: All muscle groups are graded 5 out of 5 in the foot and ankle.  Rocker-bottom foot type left.  Partial left hallux amputation.   4. Neurological: Normal sharp dull sensation; reflexes normal.             ASSESSMENT AND PLAN:   Diagnoses and all orders for this visit:    Type 2 diabetes mellitus without complication, without long-term current use of insulin ([facility])    Amputation of toe of left foot    Onychomycosis        Plan:        -Patient examined, chart history reviewed.  -Discussed importance of proper pedal hygiene, regular foot checks, and tight glucose control.  -Sharply debrided nails x 9 with a sterile nail nipper achieving a 20% reduction in thickness and length, without incident.   -Ambulate with supportive shoes and inserts and avoid walking barefoot.  -Educated patient on acute signs of infection advised patient to seek immediate medical attention if symptoms arise.    RTC in 3 months      The patient indicates understanding of these issues and agrees to the plan.        Tracie Redman DPM

## 2025-04-10 ENCOUNTER — TELEPHONE (OUTPATIENT)
Dept: FAMILY MEDICINE CLINIC | Facility: CLINIC | Age: 60
End: 2025-04-10

## 2025-04-11 RX ORDER — POTASSIUM CHLORIDE 600 MG/1
16 TABLET, FILM COATED, EXTENDED RELEASE ORAL 2 TIMES DAILY
Qty: 360 TABLET | Refills: 0 | Status: SHIPPED | OUTPATIENT
Start: 2025-04-11

## 2025-04-11 NOTE — TELEPHONE ENCOUNTER
Please review: medication fails/has no protocol attached.    No future appointments.    Comp Metabolic Panel   Component  Ref Range & Units (hover) 10/21/24  2:48 PM   Potassium 3.9

## 2025-07-02 NOTE — Clinical Note
LOV note reviewed. PDMP reviewed.     Dr Barbosa, please sign.    Spoke with pt today--TCM/HFU appt made for 7/10/2023--thank you.   Future Appointments 7/10/2023  11:30 AM   Candance Lather, APRN [facility] [place] 7/11/2023  1:30 PM    ALFREDO Albrecht        [address] [place] [address] 10/3/2023  8:00 AM    DO MARIA ELENA Tsai

## 2025-07-22 RX ORDER — BACLOFEN 10 MG/1
10 TABLET ORAL 3 TIMES DAILY
Qty: 270 TABLET | Refills: 3 | Status: SHIPPED | OUTPATIENT
Start: 2025-07-22

## (undated) DIAGNOSIS — Z79.4 CONTROLLED TYPE 2 DIABETES MELLITUS WITH RIGHT EYE AFFECTED BY MILD NONPROLIFERATIVE RETINOPATHY WITHOUT MACULAR EDEMA, WITH LONG-TERM CURRENT USE OF INSULIN (HCC): Primary | ICD-10-CM

## (undated) DIAGNOSIS — E11.3291 CONTROLLED TYPE 2 DIABETES MELLITUS WITH RIGHT EYE AFFECTED BY MILD NONPROLIFERATIVE RETINOPATHY WITHOUT MACULAR EDEMA, WITH LONG-TERM CURRENT USE OF INSULIN (HCC): Primary | ICD-10-CM

## (undated) DIAGNOSIS — M10.9 ACUTE GOUT OF ELBOW, UNSPECIFIED CAUSE, UNSPECIFIED LATERALITY: ICD-10-CM

## (undated) DIAGNOSIS — R60.0 BILATERAL LEG EDEMA: ICD-10-CM

## (undated) DEVICE — STERILE TETRA-FLEX CF, ELASTIC BANDAGE, 4" X 5.5YD: Brand: TETRA-FLEX™CF

## (undated) DEVICE — GAUZE TRAY STERILE 4X4 12PLY

## (undated) DEVICE — SOL NACL IRRIG 0.9% 1000ML BTL

## (undated) DEVICE — KIT CLEAN ENDOKIT 1.1OZ GOWNX2

## (undated) DEVICE — SUT ETHILON 3-0 FS-1 669H

## (undated) DEVICE — KIT ENDO ORCAPOD 160/180/190

## (undated) DEVICE — SUCTION CANISTER, 3000CC,SAFELINER: Brand: DEROYAL

## (undated) DEVICE — CONTAINER,SPECIMEN,OR STERILE,4OZ: Brand: MEDLINE

## (undated) DEVICE — 12 ML SYRINGE LUER-LOCK TIP: Brand: MONOJECT

## (undated) DEVICE — CULTURE TUBE ANAEROBIC

## (undated) DEVICE — INTENDED FOR TISSUE SEPARATION, AND OTHER PROCEDURES THAT REQUIRE A SHARP SURGICAL BLADE TO PUNCTURE OR CUT.: Brand: BARD-PARKER ® STAINLESS STEEL BLADES

## (undated) DEVICE — PRECISION (7.0 X 0.51 X 18.5MM)

## (undated) DEVICE — LINE MNTR ADLT SET O2 INTMD

## (undated) DEVICE — WEBRIL COTTON UNDERCAST PADDING: Brand: WEBRIL

## (undated) DEVICE — MEDI-VAC NON-CONDUCTIVE SUCTION TUBING 6MM X 1.8M (6FT.) L: Brand: CARDINAL HEALTH

## (undated) DEVICE — SKIN PREP TRAY 4 COMPARTM TRAY: Brand: MEDLINE INDUSTRIES, INC.

## (undated) DEVICE — GAMMEX® PI HYBRID SIZE 6.5, STERILE POWDER-FREE SURGICAL GLOVE, POLYISOPRENE AND NEOPRENE BLEND: Brand: GAMMEX

## (undated) DEVICE — SUT ETHILON 2-0 FS 664H

## (undated) DEVICE — 35 ML SYRINGE REGULAR TIP: Brand: MONOJECT

## (undated) DEVICE — BANDAGE,GAUZE,BULKEE II,4.5"X4.1YD,STRL: Brand: MEDLINE

## (undated) DEVICE — LOWER EXTREMITY: Brand: MEDLINE INDUSTRIES, INC.

## (undated) DEVICE — HANDPIECE SET WITH HIGH FLOW TIP AND SUCTION TUBE: Brand: INTERPULSE

## (undated) NOTE — LETTER
4/10/2025              Teodoro ALAMO Carter        [address]        [facility] [id number]         Dear Teodoro,      Thank you for putting your trust in [facility].  Our goal is to deliver the highest quality healthcare and an exceptional patient experience. Upon reviewing of your medical record shows you are due for the following:       Annual Medicare Physical         Please call [phone], to schedule your appointment or schedule online via [employer].     If you changed to a new provider at another facility, please notify the clinic to update your records.    If you had any recent testing at another facility, please have your results faxed to our office at [phone].         Thank you and have a great day!      Sincerely,    Davin Corrigan, DO          Document electronically generated by:  Christy Razo

## (undated) NOTE — LETTER
[address], [place]  Authorization for Invasive Procedures  1.  I hereby authorize Dr. Claudia Laird , my physician and whomever may be designated as the doctor's assistant, to perform the following operation and/or procedu fever and allergic reactions, hemolytic reactions, transmission of disease such as hepatitis, AIDS, cytomegalovirus (CMV), and flluid overload.  In the event that I wish to have autologous transfusions of my own blood, or a directed donor transfusion, I unruly Signature of Patient:  ________________________________________________ Date: _________Time: _________    Responsible person in case of minor or unconscious: _____________________________Relationship: ____________     Witness Signature: _______________

## (undated) NOTE — LETTER
[facility]  Administration of Anesthesia  1. Vel Nichole, or _________________________________ acting on his behalf, (Patient) (Dependent/Representative) request to receive anesthesia for my pending procedure/operation/treatment.   A bleeding, seizure, cardiac arrest and death. 7. AWARENESS: I understand that it is possible (but unlikely) to have explicit memory of events from the operating room while under general anesthesia.   8. ELECTROCONVULSIVE THERAPY PATIENTS: This consent serve below affirms that prior to the time of the procedure, I have explained to the patient and/or his/her guardian, the risks and benefits of undergoing anesthesia, as well as any reasonable alternatives.     ___________________________________________________

## (undated) NOTE — Clinical Note
Thank you for the consult. I saw Mr. Machado in the endocrine/diabetes clinic today. Please see attached my note. Please feel free to contact me with any questions. Thanks!

## (undated) NOTE — LETTER
[address]  Authorization for Surgical Operation and Procedure                                                                                           I hereby authorize Shelbie Aschoff, DPM, my physician and his/her assistants (if applicable), which may include medical students, residents, and/or fellows, to perform the following surgical operation/ procedure and administer such anesthesia as may be determined necessary by my physician: Operation/Procedure name (s) left hallux amputation on Teodoro C Carter   2. I recognize that during the surgical operation/procedure, unforeseen conditions may necessitate additional or different procedures than those listed above. I, therefore, further authorize and request that the above-named surgeon, assistants, or designees perform such procedures as are, in their judgment, necessary and desirable. 3.   My surgeon/physician has discussed prior to my surgery the potential benefits, risks and side effects of this procedure; the likelihood of achieving goals; and potential problems that might occur during recuperation. They also discussed reasonable alternatives to the procedure, including risks, benefits, and side effects related to the alternatives and risks related to not receiving this procedure. I have had all my questions answered and I acknowledge that no guarantee has been made as to the result that may be obtained. 4.   Should the need arise during my operation/procedure, which includes change of level of care prior to discharge, I also consent to the administration of blood and/or blood products. Further, I understand that despite careful testing and screening of blood or blood products by collecting agencies, I may still be subject to ill effects as a result of receiving a blood transfusion and/or blood products.   The following are some, but not all, of the potential risks that can occur: fever and allergic reactions, hemolytic reactions, transmission of diseases such as Hepatitis, AIDS and Cytomegalovirus (CMV) and fluid overload. In the event that I wish to have an autologous transfusion of my own blood, or a directed donor transfusion, I will discuss this with my physician. Check only if Refusing Blood or Blood Products  I understand refusal of blood or blood products as deemed necessary by my physician may have serious consequences to my condition to include possible death. I hereby assume responsibility for my refusal and release the hospital, its personnel, and my physicians from any responsibility for the consequences of my refusal.    o  Refuse   5. I authorize the use of any specimen, organs, tissues, body parts or foreign objects that may be removed from my body during the operation/procedure for diagnosis, research or teaching purposes and their subsequent disposal by hospital authorities. I also authorize the release of specimen test results and/or written reports to my treating physician on the hospital medical staff or other referring or consulting physicians involved in my care, at the discretion of the Pathologist or my treating physician. 6.   I consent to the photographing or videotaping of the operations or procedures to be performed, including appropriate portions of my body for medical, scientific, or educational purposes, provided my identity is not revealed by the pictures or by descriptive texts accompanying them. If the procedure has been photographed/videotaped, the surgeon will obtain the original picture, image, videotape or CD. The hospital will not be responsible for storage, release or maintenance of the picture, image, tape or CD.    7.   I consent to the presence of a  or observers in the operating room as deemed necessary by my physician or their designees.     8.   I recognize that in the event my procedure results in extended X-Ray/fluoroscopy time, I may develop a skin reaction. 9. If I have a Do Not Attempt Resuscitation (DNAR) order in place, that status will be suspended while in the operating room, procedural suite, and during the recovery period unless otherwise explicitly stated by me (or a person authorized to consent on my behalf). The surgeon or my attending physician will determine when the applicable recovery period ends for purposes of reinstating the DNAR order. 10. Patients having a sterilization procedure: I understand that if the procedure is successful the results will be permanent and it will therefore be impossible for me to inseminate, conceive, or bear children. I also understand that the procedure is intended to result in sterility, although the result has not been guaranteed. 11. I acknowledge that my physician has explained sedation/analgesia administration to me including the risk and benefits I consent to the administration of sedation/analgesia as may be necessary or desirable in the judgment of my physician. I CERTIFY THAT I HAVE READ AND FULLY UNDERSTAND THE ABOVE CONSENT TO OPERATION and/or OTHER PROCEDURE.     _________________________________________ _________________________________     ___________________________________  Signature of Patient     Signature of Responsible Person                   Printed Name of Responsible Person                              _________________________________________ ______________________________        ___________________________________  Signature of Witness         Date  Time         Relationship to Patient    STATEMENT OF PHYSICIAN My signature below affirms that prior to the time of the procedure; I have explained to the patient and/or his/her legal representative, the risks and benefits involved in the proposed treatment and any reasonable alternative to the proposed treatment.  I have also explained the risks and benefits involved in refusal of the proposed treatment and alternatives to the proposed treatment and have answered the patient's questions.  If I have a significant financial interest in a co-management agreement or a significant financial interest in any product or implant, or other significant relationship used in this procedure/surgery, I have disclosed this and had a discussion with my patient.     _______________________________________________________________ _____________________________  David Wills of Physician)                                                                                         (Date)                                   (Time)  Patient Name: Dayna Bower    : 10/20/1965   Printed: 2023      Medical Record #: [id number]                                              Page 1 of 1

## (undated) NOTE — MR AVS SNAPSHOT
[facility]  [address], [address] [place] [address]  [phone]               Thank you for choosing us for your health care visit with Darius Rivera DO.   We are glad to serve you and happy to provide you with this sum 110/78 mmHg 88 97.6 °F (36.4 °C) (Oral) 5' 9\" (1.753 m) 290 lb (131.543 kg) 42.81 kg/m2         Current Medications          This list is accurate as of: 3/16/17 12:10 PM.  Always use your most recent med list.                Amitriptyline HCl 25 MG Tabs Commonly known as:  EQL TRUETRACK TEST           Insulin NPH Isophane & Regular (70-30) 100 UNIT/ML Susp   Inject 32 Units into the skin 2 (two) times daily before meals.    Commonly known as:  NOVOLIN 70/30           Insulin Syringe 31G X 5/16\" 0.5 ML Mis Timolol Maleate 0.5 % Soln   Place 1 drop into both eyes 2 (two) times daily. Commonly known as:  TIMOPTIC           TRUEPLUS LANCETS 33G Misc   Check sugars 2-3 times a day.            TRUETRACK BLOOD GLUCOSE w/Device Kit   Use meter to test blood sugar

## (undated) NOTE — LETTER
[address]   Date:   3/11/2021     Name:   Conrad Kunz    YOB: 1965   MRN:   [id number]       WHERE IS YOUR PAIN NOW? Charlie the areas on your body where you feel the described sensations.   Use the appropriate sy

## (undated) NOTE — LETTER
[address] [place] [address]  Authorization for Surgical Operation or Procedure  Date: ______________       Time: _______________  1.  I hereby authorize Dr. Gopal Savage, my physician and the assistant, to perform the following operation and/or p photographing of the operations or procedures to be performed for the purposes of advancing medicine, science, and/or education, provided my identity is not revealed.  If the procedure has been videotaped, the physician/surgeon will obtain the original vide treatment and any reasonable alternative to the proposed treatment. I have also explained the risks and benefits involved in the refusal of the proposed treatment and have answered the patient's questions.  If I have a significant financial interest in a co

## (undated) NOTE — LETTER
January 14, 2021    Tobias Solorio, [address] [place] [address] [address] [address]     Patient: Lori Bazzi   YOB: 1965   Date of Visit: 1/14/2021       Dear Dr. Brittany Luque, DO:    Thank you for referring Lori Bazzi to me for e diabetes mellitus, type 2 with comp.   Insulin   • Esophageal reflux    • Glaucoma    • Obesity, unspecified    • Other and unspecified hyperlipidemia    • Unspecified essential hypertension        Surgical History: Eric Latham has a past surgical history • AMLODIPINE BESYLATE 10 MG Oral Tab TAKE ONE-HALF TABLET BY MOUTH TWICE DAILY 180 tablet 0   • POTASSIUM CHLORIDE ER 8 MEQ Oral Tab CR TAKE 2 TABLETS BY MOUTH TWICE DAILY 360 tablet 2   • BENAZEPRIL-HYDROCHLOROTHIAZIDE 20-12.5 MG Oral Tab TAKE 1 TABLET BY Negative for: Constitutional, Gastrointestinal, Neurological, Skin, Genitourinary, Musculoskeletal, HENT, Cardiovascular, Respiratory, Psychiatric, Allergic/Imm, Heme/Lymph    Last edited by Allyson Pantoja O.T. on 1/14/2021  3:11 PM. (History) Controlled type 2 diabetes mellitus with right eye affected by mild nonproliferative retinopathy without macular edema, with long-term current use of insulin (HCC)  Diabetes type II: mild background diabetic retinopathy in the right eye only, no signs of n

## (undated) NOTE — LETTER
[address], [facility]   Date:   4/26/2022     Name:   Kenneth Mays    YOB: 1965   MRN:   [id number]       WHERE IS YOUR PAIN NOW? Charlie the areas on your body where you feel the described sensations. Use the appropriate symbol. Adolph Hung the areas of radiation. Include all affected areas. Just to complete the picture, please draw in the face. ACHE:  ^ ^ ^   NUMBNESS:  0000   PINS & NEEDLES:  = = = =                              ^ ^ ^                       0000              = = = =                                    ^ ^ ^                       0000            = = = =      BURNING:  XXXX   STABBING: ////                  XXXX                ////                         XXXX          ////     Please charlie the line below indicating your degree of pain right now  with 0 being no pain 10 being the worst pain possible.                                          0             1             2              3             4              5              6              7             8             9             10         Patient Signature:

## (undated) NOTE — LETTER
[address] [place] [address]  Authorization for Surgical Operation or Procedure    1.  I hereby authorize  __________________, my physician and the assistant, to perform the following operation and/or procedure:  ULTRASOUND GUIDED TELMA performed for the purposes of advancing medicine, science, and/or education, provided my identity is not revealed. If the procedure has been videotaped, the physician/surgeon will obtain the original videotape.  The hospital will not be responsible for stor treatment. I have also explained the risks and benefits involved in the refusal of the proposed treatment and have answered the patient's questions.  If I have a significant financial interest in a co-management agreement or a significant financial interest

## (undated) NOTE — LETTER
[address] [id number]      7/26/2022    Dear Latoya Alvarenga,    It has come to our attention that you are due for: CT Chest  in August.    This test was ordered by Dr. Sergo Byrne. If you are allergic to iodine or have any questions, please call the office at [phone].        Thank you,         The [facility] Clinical Staff

## (undated) NOTE — MR AVS SNAPSHOT
[facility]  [address], [address]  [phone]               Thank you for choosing us for your health care visit with Jaida Angulo MD.  We are glad to serve you and happy to provide you with this summary This list is accurate as of: 3/15/17  1:41 PM.  Always use your most recent med list.                Amitriptyline HCl 25 MG Tabs   TAKE 1 TABLET BY MOUTH EVERY NIGHT AT BEDTIME   What changed:  See the new instructions.    Commonly known as:  ELAVIL Take 4 tablets (2,000 mg total) by mouth daily with breakfast.   What changed:    - how much to take  - when to take this   Commonly known as:  GLUCOPHAGE-XR           Methscopolamine Bromide 2.5 MG Tabs   TAKE 1 TABLET BY MOUTH TWICE DAILY   Commonly know office, you can view your past visit information in [employer] by going to Visits < Visit Summaries. [employer] questions? Call [phone] for help. [employer] is NOT to be used for urgent needs. For medical emergencies, dial 911.         Educational Inform

## (undated) NOTE — Clinical Note
Please let the patient know he has moderate carpal tunnel syndrome, cubital tunnel syndrome (entrapment of the ulnar nerve at the elbow) in both arms, and mild findings suggestive of diabetic polyneuropathy. The hand weakness is likely more related to cubital tunnel syndrome than carpal tunnel syndrome. I would recommend he be seen by hand surgery for their opinion; Dr. Vandana Jerez.